# Patient Record
Sex: FEMALE | Race: OTHER | HISPANIC OR LATINO
[De-identification: names, ages, dates, MRNs, and addresses within clinical notes are randomized per-mention and may not be internally consistent; named-entity substitution may affect disease eponyms.]

---

## 2017-01-20 ENCOUNTER — APPOINTMENT (OUTPATIENT)
Dept: PULMONOLOGY | Facility: CLINIC | Age: 32
End: 2017-01-20

## 2017-01-20 VITALS
SYSTOLIC BLOOD PRESSURE: 120 MMHG | HEART RATE: 128 BPM | BODY MASS INDEX: 29.81 KG/M2 | RESPIRATION RATE: 18 BRPM | DIASTOLIC BLOOD PRESSURE: 74 MMHG | HEIGHT: 62 IN | WEIGHT: 162 LBS | OXYGEN SATURATION: 92 %

## 2017-02-23 ENCOUNTER — RX RENEWAL (OUTPATIENT)
Age: 32
End: 2017-02-23

## 2017-02-26 ENCOUNTER — RX RENEWAL (OUTPATIENT)
Age: 32
End: 2017-02-26

## 2017-03-21 ENCOUNTER — TRANSCRIPTION ENCOUNTER (OUTPATIENT)
Age: 32
End: 2017-03-21

## 2017-03-26 ENCOUNTER — EMERGENCY (EMERGENCY)
Facility: HOSPITAL | Age: 32
LOS: 1 days | Discharge: HOME | End: 2017-03-26
Attending: EMERGENCY MEDICINE
Payer: COMMERCIAL

## 2017-03-26 VITALS
WEIGHT: 160.06 LBS | HEIGHT: 64 IN | SYSTOLIC BLOOD PRESSURE: 165 MMHG | HEART RATE: 119 BPM | OXYGEN SATURATION: 94 % | DIASTOLIC BLOOD PRESSURE: 120 MMHG | TEMPERATURE: 98 F | RESPIRATION RATE: 22 BRPM

## 2017-03-26 DIAGNOSIS — I10 ESSENTIAL (PRIMARY) HYPERTENSION: ICD-10-CM

## 2017-03-26 DIAGNOSIS — J45.21 MILD INTERMITTENT ASTHMA WITH (ACUTE) EXACERBATION: ICD-10-CM

## 2017-03-26 PROCEDURE — 99284 EMERGENCY DEPT VISIT MOD MDM: CPT | Mod: 25

## 2017-03-26 RX ORDER — ALBUTEROL 90 UG/1
3 AEROSOL, METERED ORAL
Qty: 28 | Refills: 0 | OUTPATIENT
Start: 2017-03-26 | End: 2017-04-02

## 2017-03-26 RX ORDER — IPRATROPIUM/ALBUTEROL SULFATE 18-103MCG
3 AEROSOL WITH ADAPTER (GRAM) INHALATION ONCE
Qty: 0 | Refills: 0 | Status: COMPLETED | OUTPATIENT
Start: 2017-03-26 | End: 2017-03-26

## 2017-03-26 RX ORDER — ALBUTEROL 90 UG/1
3 AEROSOL, METERED ORAL
Qty: 28 | Refills: 0 | OUTPATIENT
Start: 2017-03-26 | End: 2019-03-25

## 2017-03-26 RX ADMIN — Medication 3 MILLILITER(S): at 23:20

## 2017-03-26 RX ADMIN — Medication 40 MILLIGRAM(S): at 23:38

## 2017-03-26 RX ADMIN — Medication 3 MILLILITER(S): at 23:38

## 2017-03-26 NOTE — ED PROVIDER NOTE - MEDICAL DECISION MAKING DETAILS
32yo with acute asthma exacerbation, will tx with isaiah prajapatis and reassess. Tachycardia likely 2/2 albuterol, pt otherwise well appearing, pt thinks this is similar to her prior exaccerbations and thinks she can go home. Will discharge on steriods, f/u with pmd, strict return precautions for difficulty breathing, fevers, chills, or any other concernign symptoms.

## 2017-03-26 NOTE — ED PROVIDER NOTE - PROGRESS NOTE DETAILS
peak flow 300, pt states baseline is 350-400, states it was 240 earlier today. pt feeling much better, we ambulating while talking in full sentence without sig respiratory distress, hr still elevated, but pt just finished her final duoneb, pt states she feels well enough to go home, will discharge home. strict return precautions. pt feeling much better, we ambulating while talking in full sentence without sig respiratory distress, hr still elevated, but pt just finished her final duoneb, pt states she feels well enough to go home, will discharge home. strict return precautions. On my assessment, while ambulating, o2 sat 98, hr 118, respiratory rate 20

## 2017-03-26 NOTE — ED PROVIDER NOTE - OBJECTIVE STATEMENT
32yo female hx of htn, asthma, hospitalization in the past, no intubation p/w wheezing and sob since last night. She notes that she caught a cold and developed a wheeze. it is typical of her asthma. She took a nebuliser tx last night and then her pump several times today, but did not get better. no fevers, or chills, mild cough. No chest pain, She notes that since being here her breathing has gotten better. no hx of dvt/pe, no recent trauma/surg, no hemoptysis, no estrogen, no unilateral leg swelling.

## 2017-03-27 VITALS — HEART RATE: 109 BPM

## 2017-03-27 PROCEDURE — 99285 EMERGENCY DEPT VISIT HI MDM: CPT | Mod: 25

## 2017-03-27 PROCEDURE — 94640 AIRWAY INHALATION TREATMENT: CPT

## 2017-03-27 RX ADMIN — Medication 3 MILLILITER(S): at 00:27

## 2017-03-27 NOTE — ED ADULT NURSE NOTE - OBJECTIVE STATEMENT
Pt presented to er with c/o shortness of breath with asthma exacerbation. as per pt she run out off her asthma medications.

## 2017-04-18 ENCOUNTER — APPOINTMENT (OUTPATIENT)
Dept: INTERNAL MEDICINE | Facility: CLINIC | Age: 32
End: 2017-04-18

## 2017-04-18 ENCOUNTER — RX RENEWAL (OUTPATIENT)
Age: 32
End: 2017-04-18

## 2017-04-18 VITALS — DIASTOLIC BLOOD PRESSURE: 90 MMHG | HEART RATE: 92 BPM | SYSTOLIC BLOOD PRESSURE: 128 MMHG

## 2017-04-18 VITALS
SYSTOLIC BLOOD PRESSURE: 159 MMHG | DIASTOLIC BLOOD PRESSURE: 95 MMHG | BODY MASS INDEX: 29.63 KG/M2 | OXYGEN SATURATION: 95 % | HEART RATE: 105 BPM | WEIGHT: 162 LBS

## 2017-04-18 VITALS — HEART RATE: 96 BPM | SYSTOLIC BLOOD PRESSURE: 130 MMHG | DIASTOLIC BLOOD PRESSURE: 90 MMHG

## 2017-04-18 RX ORDER — PREDNISONE 10 MG/1
10 TABLET ORAL
Qty: 100 | Refills: 0 | Status: DISCONTINUED | COMMUNITY
Start: 2017-01-20 | End: 2017-04-18

## 2017-05-08 ENCOUNTER — APPOINTMENT (OUTPATIENT)
Dept: PULMONOLOGY | Facility: CLINIC | Age: 32
End: 2017-05-08

## 2017-05-08 ENCOUNTER — TRANSCRIPTION ENCOUNTER (OUTPATIENT)
Age: 32
End: 2017-05-08

## 2017-05-08 VITALS
DIASTOLIC BLOOD PRESSURE: 70 MMHG | OXYGEN SATURATION: 97 % | SYSTOLIC BLOOD PRESSURE: 108 MMHG | HEART RATE: 83 BPM | RESPIRATION RATE: 17 BRPM | HEIGHT: 62 IN | WEIGHT: 160 LBS | BODY MASS INDEX: 29.44 KG/M2

## 2017-05-10 ENCOUNTER — TRANSCRIPTION ENCOUNTER (OUTPATIENT)
Age: 32
End: 2017-05-10

## 2017-05-19 ENCOUNTER — APPOINTMENT (OUTPATIENT)
Dept: INTERNAL MEDICINE | Facility: CLINIC | Age: 32
End: 2017-05-19

## 2017-05-19 VITALS
DIASTOLIC BLOOD PRESSURE: 90 MMHG | BODY MASS INDEX: 28.9 KG/M2 | SYSTOLIC BLOOD PRESSURE: 131 MMHG | HEART RATE: 88 BPM | OXYGEN SATURATION: 90 % | WEIGHT: 158 LBS

## 2017-05-19 VITALS — SYSTOLIC BLOOD PRESSURE: 110 MMHG | DIASTOLIC BLOOD PRESSURE: 96 MMHG

## 2017-05-19 VITALS — SYSTOLIC BLOOD PRESSURE: 120 MMHG | DIASTOLIC BLOOD PRESSURE: 100 MMHG

## 2017-05-22 ENCOUNTER — MEDICATION RENEWAL (OUTPATIENT)
Age: 32
End: 2017-05-22

## 2017-05-23 ENCOUNTER — MEDICATION RENEWAL (OUTPATIENT)
Age: 32
End: 2017-05-23

## 2017-05-23 ENCOUNTER — TRANSCRIPTION ENCOUNTER (OUTPATIENT)
Age: 32
End: 2017-05-23

## 2017-06-05 LAB
ALBUMIN SERPL ELPH-MCNC: 4.4 G/DL
ALP BLD-CCNC: 79 U/L
ALT SERPL-CCNC: 20 U/L
ANION GAP SERPL CALC-SCNC: 16 MMOL/L
ANION GAP SERPL CALC-SCNC: 17 MMOL/L
AST SERPL-CCNC: 25 U/L
BILIRUB SERPL-MCNC: 0.3 MG/DL
BUN SERPL-MCNC: 14 MG/DL
BUN SERPL-MCNC: 14 MG/DL
CALCIUM SERPL-MCNC: 9.5 MG/DL
CALCIUM SERPL-MCNC: 9.6 MG/DL
CHLORIDE SERPL-SCNC: 100 MMOL/L
CHLORIDE SERPL-SCNC: 101 MMOL/L
CHOLEST SERPL-MCNC: 211 MG/DL
CHOLEST/HDLC SERPL: 3.1 RATIO
CO2 SERPL-SCNC: 23 MMOL/L
CO2 SERPL-SCNC: 25 MMOL/L
CREAT SERPL-MCNC: 0.82 MG/DL
CREAT SERPL-MCNC: 0.83 MG/DL
GLUCOSE SERPL-MCNC: 101 MG/DL
GLUCOSE SERPL-MCNC: 95 MG/DL
HBA1C MFR BLD HPLC: 6.6 %
HDLC SERPL-MCNC: 68 MG/DL
LDLC SERPL CALC-MCNC: 121 MG/DL
POTASSIUM SERPL-SCNC: 4.3 MMOL/L
POTASSIUM SERPL-SCNC: 4.4 MMOL/L
PROT SERPL-MCNC: 7.9 G/DL
SODIUM SERPL-SCNC: 140 MMOL/L
SODIUM SERPL-SCNC: 142 MMOL/L
TRIGL SERPL-MCNC: 111 MG/DL

## 2017-06-07 ENCOUNTER — TRANSCRIPTION ENCOUNTER (OUTPATIENT)
Age: 32
End: 2017-06-07

## 2017-06-10 ENCOUNTER — RX RENEWAL (OUTPATIENT)
Age: 32
End: 2017-06-10

## 2017-06-14 ENCOUNTER — MEDICATION RENEWAL (OUTPATIENT)
Age: 32
End: 2017-06-14

## 2017-07-11 ENCOUNTER — APPOINTMENT (OUTPATIENT)
Dept: INTERNAL MEDICINE | Facility: CLINIC | Age: 32
End: 2017-07-11

## 2017-07-14 ENCOUNTER — RX RENEWAL (OUTPATIENT)
Age: 32
End: 2017-07-14

## 2017-08-09 ENCOUNTER — APPOINTMENT (OUTPATIENT)
Dept: PULMONOLOGY | Facility: CLINIC | Age: 32
End: 2017-08-09

## 2017-09-11 ENCOUNTER — MEDICATION RENEWAL (OUTPATIENT)
Age: 32
End: 2017-09-11

## 2017-09-11 ENCOUNTER — RX RENEWAL (OUTPATIENT)
Age: 32
End: 2017-09-11

## 2017-09-12 ENCOUNTER — RX RENEWAL (OUTPATIENT)
Age: 32
End: 2017-09-12

## 2017-10-06 ENCOUNTER — APPOINTMENT (OUTPATIENT)
Dept: INTERNAL MEDICINE | Facility: CLINIC | Age: 32
End: 2017-10-06
Payer: COMMERCIAL

## 2017-10-06 VITALS
OXYGEN SATURATION: 98 % | HEIGHT: 62 IN | SYSTOLIC BLOOD PRESSURE: 130 MMHG | HEART RATE: 78 BPM | WEIGHT: 161 LBS | BODY MASS INDEX: 29.63 KG/M2 | DIASTOLIC BLOOD PRESSURE: 100 MMHG

## 2017-10-06 VITALS — SYSTOLIC BLOOD PRESSURE: 140 MMHG | DIASTOLIC BLOOD PRESSURE: 90 MMHG

## 2017-10-06 VITALS — SYSTOLIC BLOOD PRESSURE: 130 MMHG | DIASTOLIC BLOOD PRESSURE: 88 MMHG

## 2017-10-06 DIAGNOSIS — Z87.898 PERSONAL HISTORY OF OTHER SPECIFIED CONDITIONS: ICD-10-CM

## 2017-10-06 DIAGNOSIS — R06.02 SHORTNESS OF BREATH: ICD-10-CM

## 2017-10-06 PROCEDURE — 90688 IIV4 VACCINE SPLT 0.5 ML IM: CPT

## 2017-10-06 PROCEDURE — 99395 PREV VISIT EST AGE 18-39: CPT | Mod: 25

## 2017-10-06 PROCEDURE — G0008: CPT

## 2017-10-06 RX ORDER — PREDNISONE 10 MG/1
10 TABLET ORAL
Qty: 1 | Refills: 0 | Status: DISCONTINUED | COMMUNITY
Start: 2017-05-22 | End: 2017-10-06

## 2017-10-10 LAB
25(OH)D3 SERPL-MCNC: 14.5 NG/ML
ANION GAP SERPL CALC-SCNC: 15 MMOL/L
BASOPHILS # BLD AUTO: 0.04 K/UL
BASOPHILS NFR BLD AUTO: 0.8 %
BUN SERPL-MCNC: 12 MG/DL
CALCIUM SERPL-MCNC: 9.5 MG/DL
CHLORIDE SERPL-SCNC: 102 MMOL/L
CHOLEST SERPL-MCNC: 205 MG/DL
CHOLEST/HDLC SERPL: 2.8 RATIO
CO2 SERPL-SCNC: 23 MMOL/L
CREAT SERPL-MCNC: 0.88 MG/DL
CREAT SPEC-SCNC: 51 MG/DL
EOSINOPHIL # BLD AUTO: 0.52 K/UL
EOSINOPHIL NFR BLD AUTO: 10.3 %
GLUCOSE SERPL-MCNC: 93 MG/DL
HBA1C MFR BLD HPLC: 6.1 %
HCT VFR BLD CALC: 41 %
HDLC SERPL-MCNC: 72 MG/DL
HGB BLD-MCNC: 13.6 G/DL
IMM GRANULOCYTES NFR BLD AUTO: 0.2 %
LDLC SERPL CALC-MCNC: 119 MG/DL
LYMPHOCYTES # BLD AUTO: 1.46 K/UL
LYMPHOCYTES NFR BLD AUTO: 28.9 %
MAN DIFF?: NORMAL
MCHC RBC-ENTMCNC: 29.2 PG
MCHC RBC-ENTMCNC: 33.2 GM/DL
MCV RBC AUTO: 88 FL
MICROALBUMIN 24H UR DL<=1MG/L-MCNC: 0.4 MG/DL
MICROALBUMIN/CREAT 24H UR-RTO: 8 MG/G
MONOCYTES # BLD AUTO: 0.44 K/UL
MONOCYTES NFR BLD AUTO: 8.7 %
NEUTROPHILS # BLD AUTO: 2.59 K/UL
NEUTROPHILS NFR BLD AUTO: 51.1 %
PLATELET # BLD AUTO: 266 K/UL
POTASSIUM SERPL-SCNC: 4.8 MMOL/L
RBC # BLD: 4.66 M/UL
RBC # FLD: 14 %
SODIUM SERPL-SCNC: 140 MMOL/L
TRIGL SERPL-MCNC: 72 MG/DL
WBC # FLD AUTO: 5.06 K/UL

## 2017-11-06 ENCOUNTER — RX RENEWAL (OUTPATIENT)
Age: 32
End: 2017-11-06

## 2017-12-13 ENCOUNTER — APPOINTMENT (OUTPATIENT)
Dept: PULMONOLOGY | Facility: CLINIC | Age: 32
End: 2017-12-13
Payer: COMMERCIAL

## 2017-12-13 VITALS
HEART RATE: 97 BPM | SYSTOLIC BLOOD PRESSURE: 140 MMHG | WEIGHT: 163 LBS | DIASTOLIC BLOOD PRESSURE: 90 MMHG | HEIGHT: 62 IN | OXYGEN SATURATION: 98 % | BODY MASS INDEX: 30 KG/M2 | RESPIRATION RATE: 18 BRPM

## 2017-12-13 PROCEDURE — 99214 OFFICE O/P EST MOD 30 MIN: CPT

## 2018-02-21 ENCOUNTER — RX RENEWAL (OUTPATIENT)
Age: 33
End: 2018-02-21

## 2018-04-11 ENCOUNTER — APPOINTMENT (OUTPATIENT)
Dept: INTERNAL MEDICINE | Facility: CLINIC | Age: 33
End: 2018-04-11
Payer: COMMERCIAL

## 2018-04-11 VITALS
HEART RATE: 84 BPM | BODY MASS INDEX: 29.26 KG/M2 | DIASTOLIC BLOOD PRESSURE: 90 MMHG | OXYGEN SATURATION: 98 % | HEIGHT: 62 IN | WEIGHT: 159 LBS | SYSTOLIC BLOOD PRESSURE: 120 MMHG

## 2018-04-11 VITALS — SYSTOLIC BLOOD PRESSURE: 120 MMHG | DIASTOLIC BLOOD PRESSURE: 90 MMHG

## 2018-04-11 PROCEDURE — 99214 OFFICE O/P EST MOD 30 MIN: CPT

## 2018-04-11 RX ORDER — BECLOMETHASONE DIPROPIONATE 80 UG/1
80 AEROSOL, METERED RESPIRATORY (INHALATION) TWICE DAILY
Qty: 3 | Refills: 1 | Status: DISCONTINUED | COMMUNITY
Start: 2017-01-20 | End: 2018-04-11

## 2018-04-11 RX ORDER — LEVOCETIRIZINE DIHYDROCHLORIDE 5 MG/1
5 TABLET ORAL
Qty: 90 | Refills: 3 | Status: DISCONTINUED | COMMUNITY
Start: 2017-02-23 | End: 2018-04-11

## 2018-04-12 ENCOUNTER — APPOINTMENT (OUTPATIENT)
Dept: PULMONOLOGY | Facility: CLINIC | Age: 33
End: 2018-04-12
Payer: COMMERCIAL

## 2018-04-12 VITALS
HEIGHT: 62 IN | WEIGHT: 157 LBS | BODY MASS INDEX: 28.89 KG/M2 | DIASTOLIC BLOOD PRESSURE: 75 MMHG | SYSTOLIC BLOOD PRESSURE: 115 MMHG | HEART RATE: 93 BPM | RESPIRATION RATE: 15 BRPM | OXYGEN SATURATION: 96 %

## 2018-04-12 PROCEDURE — 99214 OFFICE O/P EST MOD 30 MIN: CPT | Mod: 25

## 2018-04-12 PROCEDURE — 71046 X-RAY EXAM CHEST 2 VIEWS: CPT

## 2018-04-12 RX ORDER — PREDNISONE 20 MG/1
20 TABLET ORAL DAILY
Qty: 6 | Refills: 0 | Status: DISCONTINUED | COMMUNITY
Start: 2018-04-11 | End: 2018-04-12

## 2018-04-16 RX ORDER — BECLOMETHASONE DIPROPIONATE HFA 80 UG/1
80 AEROSOL, METERED RESPIRATORY (INHALATION) TWICE DAILY
Qty: 3 | Refills: 1 | Status: DISCONTINUED | COMMUNITY
Start: 2018-04-12 | End: 2018-04-16

## 2018-04-16 RX ORDER — BECLOMETHASONE DIPROPIONATE HFA 80 UG/1
80 AEROSOL, METERED RESPIRATORY (INHALATION)
Qty: 1 | Refills: 3 | Status: DISCONTINUED | COMMUNITY
Start: 2018-02-21 | End: 2018-04-16

## 2018-04-23 LAB
ANION GAP SERPL CALC-SCNC: 16 MMOL/L
BUN SERPL-MCNC: 16 MG/DL
CALCIUM SERPL-MCNC: 10.1 MG/DL
CHLORIDE SERPL-SCNC: 103 MMOL/L
CO2 SERPL-SCNC: 17 MMOL/L
CREAT SERPL-MCNC: 1.15 MG/DL
GLUCOSE SERPL-MCNC: 74 MG/DL
HBA1C MFR BLD HPLC: 6.6 %
POTASSIUM SERPL-SCNC: 4.8 MMOL/L
SODIUM SERPL-SCNC: 136 MMOL/L

## 2018-06-13 ENCOUNTER — APPOINTMENT (OUTPATIENT)
Dept: PULMONOLOGY | Facility: CLINIC | Age: 33
End: 2018-06-13

## 2018-06-18 ENCOUNTER — APPOINTMENT (OUTPATIENT)
Dept: PULMONOLOGY | Facility: CLINIC | Age: 33
End: 2018-06-18
Payer: COMMERCIAL

## 2018-06-18 VITALS
DIASTOLIC BLOOD PRESSURE: 70 MMHG | WEIGHT: 157 LBS | BODY MASS INDEX: 28.89 KG/M2 | HEIGHT: 62 IN | RESPIRATION RATE: 17 BRPM | OXYGEN SATURATION: 94 % | HEART RATE: 108 BPM | SYSTOLIC BLOOD PRESSURE: 110 MMHG

## 2018-06-18 PROCEDURE — 99214 OFFICE O/P EST MOD 30 MIN: CPT

## 2018-06-21 ENCOUNTER — MEDICATION RENEWAL (OUTPATIENT)
Age: 33
End: 2018-06-21

## 2018-06-22 ENCOUNTER — MEDICATION RENEWAL (OUTPATIENT)
Age: 33
End: 2018-06-22

## 2018-07-19 ENCOUNTER — MEDICATION RENEWAL (OUTPATIENT)
Age: 33
End: 2018-07-19

## 2018-08-09 ENCOUNTER — MEDICATION RENEWAL (OUTPATIENT)
Age: 33
End: 2018-08-09

## 2018-09-20 ENCOUNTER — APPOINTMENT (OUTPATIENT)
Dept: PULMONOLOGY | Facility: CLINIC | Age: 33
End: 2018-09-20
Payer: COMMERCIAL

## 2018-09-20 ENCOUNTER — NON-APPOINTMENT (OUTPATIENT)
Age: 33
End: 2018-09-20

## 2018-09-20 VITALS
HEIGHT: 62 IN | OXYGEN SATURATION: 95 % | DIASTOLIC BLOOD PRESSURE: 80 MMHG | RESPIRATION RATE: 17 BRPM | HEART RATE: 72 BPM | SYSTOLIC BLOOD PRESSURE: 120 MMHG | BODY MASS INDEX: 28.71 KG/M2 | WEIGHT: 156 LBS

## 2018-09-20 DIAGNOSIS — J45.901 ACUTE BRONCHITIS, UNSPECIFIED: ICD-10-CM

## 2018-09-20 DIAGNOSIS — J20.9 ACUTE BRONCHITIS, UNSPECIFIED: ICD-10-CM

## 2018-09-20 PROCEDURE — 99214 OFFICE O/P EST MOD 30 MIN: CPT | Mod: 25

## 2018-09-20 PROCEDURE — 94010 BREATHING CAPACITY TEST: CPT

## 2018-09-20 NOTE — ASSESSMENT
[FreeTextEntry1] : Ms. Blue is a 32 year old female with a history of asthma / GERD / Allergy / overweight. Her asthma is active and is both IgE mediated and eosinophil mediated- her asthma is poor controlled - still active . \par \par problem 1: severe persistent asthma, currently active \par *climate related - GERD \par -Continue albuterol by the nebulizer QID\par -continue to use Ventolin PRN and before exercise\par -continue to use Advair 500 at 1 inhalation BId\par -Continue QVar 80 at 2 inhalations BID\par -Continue Spiriva 2 inhalations QD\par -continue to use Singulair 10 mg before bed\par -all meds needed were renewed\par -Inhaler technique reviewed as well as oral hygiene techniques reviewed with patient. Avoidance of cold air, extremes of temperature, rescue inhaler should be used before exercise. Order of medication reviewed with patient. Recommended use of a cool mist humidifier in the bedroom. \par \par problem 2: Allergic Asthma (Elevated IgE)\par -her elevated IgE makes her a candidate for Xolair\par \par Problem 3; Eosinophilic Asthma\par - She is a candidate for Nucala - approved\par -The safety and efficacy of Nucala was established in three double-blind, randomized, placebo-controlled trials in patients with severe asthma. Compared to a placebo, patients with severe asthma receiving Nucala had fewer exacerbation requiring hospitalization and/or emergency department visits, and a longer time to first exacerbation. In addition, patients with severe asthma receiving Nucala or Fasenra experienced greater reductions in their daily maintenance oral corticosteroid dose, while maintaining asthma control compared with patients receiving placebo. Treatment with Nucala did not result in a significant improvement in lung function, as measured by the volume of air exhaled by patients in one second. The most common side effects include: headache, injection site reactions, back pain, weakness, and fatigue; hypersensitivity reactions can occur within hours or days including swelling of the face, mouth, and tongue, fainting, dizziness, hives, breathing problems, and rash; herpes zoster infections have occurred. The drug is a monoclonal antibody that inhibits interleukin-5 which helps regular eosinophils, a type of white blood cell that contributes to asthma. The over-production of eosinophils can cause inflammation in the lungs, increasing the frequency of asthma attacks. Patients must also take other medications, including high dose inhaled corticosteroids and at least one additional asthma drug\par \par problem 4: allergic rhinitis\par -continue to use Flonase 1 sniff each nostril BID\par -continue to use Xyzal 5 mg before bed \par -Environmental measures for allergies were encouraged including mattress and pillow cover, air purifier, and environmental controls.\par \par problem 5: low vitamin d\par -continue to use supplemental vitamin D\par -Has been associated with asthma exacerbations and increased allergic symptoms. The goal based on recent information is maintaining levels between 50-70 and low normal is 30. Recommended 50,000 units every two weeks to once a month depending on the level. \par \par problem 6: GERD-stable/controlled\par -add Protonix 40 mg QAM \par -Continue Zantac 300 mg QHS\par -Rule of 2s: avoid eating too much, eating too late, eating too spicy, eating two hours before bed\par -Things to avoid including overeating, spicy foods, tight clothing, eating within three hours of bed, this list is not all inclusive. \par -For treatment of reflux, possible options discussed including diet control, H2 blockers, PPIs, as well as coating motility agents discussed as treatment options. Timing of meals and proximity of last meal to sleep were discussed. If symptoms persist, a formal gastrointestinal evaluation is needed. \par \par problem 7: overweight\par -Weight loss, exercise, and diet control were discussed and are highly encouraged. Treatment options were given such as, aqua therapy, and contacting a nutritionist. Recommended to use the elliptical, stationary bike, less use of treadmill. \par \par problem 8: health maintenance \par -recommended yearly flu shot after October 15\par -recommended strep pneumonia vaccines: Prevnar-13 vaccine, followed by Pneumo vaccine 23 one year following\par -recommended early intervention for URIs\par -recommended regular osteoporosis evaluations\par -recommended early dermatological evaluations\par -recommended after the age of 50 to the age of 70, colonoscopy every 5 years \par \par \par Keep her F/U \par She is encouraged to call with any changes, concerns, or questions

## 2018-09-20 NOTE — PROCEDURE
[FreeTextEntry1] : PFT - spi reveals mild restrictive / mild obstructive dysfunction ; FEV1 is 1.61 which is 54% of predicted, normal flow volume loop

## 2018-09-20 NOTE — PHYSICAL EXAM

## 2018-09-20 NOTE — REASON FOR VISIT
[Follow-Up] : a follow-up visit [FreeTextEntry1] : allergic eosinophilia, allergic rhinitis, elevated IgE, severe persistent asthma and vitamin D deficiency

## 2018-09-20 NOTE — HISTORY OF PRESENT ILLNESS
[FreeTextEntry1] : Ms. Blue is a 32 year old female with a history of allergic eosinophilia, allergic rhinitis, elevated IgE, severe persistent asthma and vitamin D deficiency presenting to the office for a follow up visit. Her chief complaint is\par -she noticed that she has been wheezing when the weather has been hot and humid\par -she states that she was just informed that her insurance will cover her Nucala injections\par -she states that she has been sleeping well but not sleeping enough due to her schedule\par -she reports that she has not been exercising regularly\par -she notes that she has still been getting some heartburn even while on reflux medications\par -she notes that she has not been snoring, and her memory / concentration have been good\par -she states that she gets occasional leg cramps while at night , although she believes she does not drink enough water \par -she denies any headaches, nausea, vomiting, fever, chills, sweats, chest pain, chest pressure, diarrhea, constipation, dysphagia, dizziness, leg swelling, leg pain, itchy eyes, itchy ears, heartburn, reflux, or sour taste in the mouth.

## 2018-09-20 NOTE — ADDENDUM
[FreeTextEntry1] : All medical record entries made by crystal Dixon were at Dr. Zac Kelley's, direction and personally dictated by me on (9/20/2018). I have reviewed the chart and agree that the record accurately reflects my personal performance of the history, physical exam, assessment and plan. I have also personally directed, reviewed, and agree with the discharge instructions.

## 2018-09-21 ENCOUNTER — MEDICATION RENEWAL (OUTPATIENT)
Age: 33
End: 2018-09-21

## 2018-10-08 ENCOUNTER — APPOINTMENT (OUTPATIENT)
Dept: PULMONOLOGY | Facility: CLINIC | Age: 33
End: 2018-10-08

## 2018-10-12 ENCOUNTER — APPOINTMENT (OUTPATIENT)
Dept: PULMONOLOGY | Facility: CLINIC | Age: 33
End: 2018-10-12
Payer: COMMERCIAL

## 2018-10-12 VITALS
HEART RATE: 99 BPM | BODY MASS INDEX: 29.44 KG/M2 | RESPIRATION RATE: 15 BRPM | HEIGHT: 62 IN | WEIGHT: 160 LBS | DIASTOLIC BLOOD PRESSURE: 80 MMHG | OXYGEN SATURATION: 94 % | SYSTOLIC BLOOD PRESSURE: 110 MMHG

## 2018-10-12 VITALS
HEART RATE: 105 BPM | SYSTOLIC BLOOD PRESSURE: 110 MMHG | OXYGEN SATURATION: 94 % | DIASTOLIC BLOOD PRESSURE: 70 MMHG | BODY MASS INDEX: 29.44 KG/M2 | RESPIRATION RATE: 15 BRPM | HEIGHT: 62 IN | WEIGHT: 160 LBS

## 2018-10-12 VITALS
BODY MASS INDEX: 29.44 KG/M2 | WEIGHT: 160 LBS | OXYGEN SATURATION: 94 % | HEART RATE: 88 BPM | DIASTOLIC BLOOD PRESSURE: 70 MMHG | HEIGHT: 62 IN | SYSTOLIC BLOOD PRESSURE: 110 MMHG | RESPIRATION RATE: 15 BRPM

## 2018-10-12 VITALS
RESPIRATION RATE: 15 BRPM | DIASTOLIC BLOOD PRESSURE: 80 MMHG | BODY MASS INDEX: 29.26 KG/M2 | WEIGHT: 160 LBS | HEART RATE: 100 BPM | OXYGEN SATURATION: 97 % | SYSTOLIC BLOOD PRESSURE: 110 MMHG

## 2018-10-12 PROCEDURE — 96401 CHEMO ANTI-NEOPL SQ/IM: CPT

## 2018-10-12 RX ORDER — MEPOLIZUMAB 100 MG/ML
100 INJECTION, POWDER, FOR SOLUTION SUBCUTANEOUS
Qty: 0 | Refills: 0 | Status: COMPLETED | OUTPATIENT
Start: 2018-10-12

## 2018-10-12 RX ADMIN — MEPOLIZUMAB 1 MG: 100 INJECTION, POWDER, FOR SOLUTION SUBCUTANEOUS at 00:00

## 2018-11-09 ENCOUNTER — APPOINTMENT (OUTPATIENT)
Dept: PULMONOLOGY | Facility: CLINIC | Age: 33
End: 2018-11-09
Payer: COMMERCIAL

## 2018-11-09 VITALS
HEIGHT: 62 IN | WEIGHT: 162 LBS | RESPIRATION RATE: 15 BRPM | OXYGEN SATURATION: 98 % | HEART RATE: 92 BPM | DIASTOLIC BLOOD PRESSURE: 70 MMHG | BODY MASS INDEX: 29.81 KG/M2 | SYSTOLIC BLOOD PRESSURE: 110 MMHG

## 2018-11-09 PROCEDURE — 96401 CHEMO ANTI-NEOPL SQ/IM: CPT

## 2018-11-09 RX ORDER — MEPOLIZUMAB 100 MG/ML
100 INJECTION, POWDER, FOR SOLUTION SUBCUTANEOUS
Qty: 0 | Refills: 0 | Status: COMPLETED | OUTPATIENT
Start: 2018-11-09

## 2018-11-09 RX ADMIN — MEPOLIZUMAB 1 MG: 100 INJECTION, POWDER, FOR SOLUTION SUBCUTANEOUS at 00:00

## 2018-11-29 ENCOUNTER — RX RENEWAL (OUTPATIENT)
Age: 33
End: 2018-11-29

## 2018-11-30 ENCOUNTER — RX RENEWAL (OUTPATIENT)
Age: 33
End: 2018-11-30

## 2018-12-03 RX ORDER — OLMESARTAN MEDOXOMIL AND HYDROCHLOROTHIAZIDE 40; 25 MG/1; MG/1
40-25 TABLET ORAL
Qty: 90 | Refills: 3 | Status: DISCONTINUED | COMMUNITY
Start: 2017-05-19 | End: 2018-12-03

## 2018-12-07 ENCOUNTER — APPOINTMENT (OUTPATIENT)
Dept: INTERNAL MEDICINE | Facility: CLINIC | Age: 33
End: 2018-12-07
Payer: COMMERCIAL

## 2018-12-07 ENCOUNTER — APPOINTMENT (OUTPATIENT)
Dept: PULMONOLOGY | Facility: CLINIC | Age: 33
End: 2018-12-07

## 2018-12-07 VITALS
HEART RATE: 91 BPM | SYSTOLIC BLOOD PRESSURE: 122 MMHG | HEIGHT: 62 IN | BODY MASS INDEX: 29.81 KG/M2 | WEIGHT: 162 LBS | OXYGEN SATURATION: 98 % | DIASTOLIC BLOOD PRESSURE: 80 MMHG

## 2018-12-07 VITALS — SYSTOLIC BLOOD PRESSURE: 118 MMHG | DIASTOLIC BLOOD PRESSURE: 80 MMHG

## 2018-12-07 PROCEDURE — 90686 IIV4 VACC NO PRSV 0.5 ML IM: CPT

## 2018-12-07 PROCEDURE — 99214 OFFICE O/P EST MOD 30 MIN: CPT | Mod: 25

## 2018-12-07 PROCEDURE — G0008: CPT

## 2018-12-08 NOTE — HISTORY OF PRESENT ILLNESS
[FreeTextEntry1] : bp check [de-identified] : 32 yo female with h/o as below including HTN and asthma here for bp check.  Just recently found out she was pregnant, so ARB was d/c'd and switched back to Nifedipine.  \par Most days bp has been in 120s/80s.  On Nifedipine, not having migraines currently with it.\par LMP 10/24/18.  Currently six weeks pregnant.  Has appt with gyn 12/21.\par Otherwise feeling well.  Asthma under good control, not wheezy.  Was getting GERD before, not anymore.  Hasn't been taking asthma pills as not sure what she could take.  Does take spiriva and qvar and advair.\par No other active issues.\par \par

## 2018-12-08 NOTE — ASSESSMENT
[FreeTextEntry1] : 32 yo female with h/o as above including asthma and HTN here for f/up and bp check, now pregnant.  BP at goal on nifedipine, c/w current regimen, reviewed other meds with regards to pregnancy class, pt to f/up with obgyn in a few weeks and will see high  as well.  Flu shot today.  RTO prn.

## 2018-12-19 ENCOUNTER — RX RENEWAL (OUTPATIENT)
Age: 33
End: 2018-12-19

## 2018-12-21 ENCOUNTER — NON-APPOINTMENT (OUTPATIENT)
Age: 33
End: 2018-12-21

## 2018-12-21 ENCOUNTER — APPOINTMENT (OUTPATIENT)
Dept: OBGYN | Facility: CLINIC | Age: 33
End: 2018-12-21
Payer: COMMERCIAL

## 2018-12-21 ENCOUNTER — APPOINTMENT (OUTPATIENT)
Dept: PULMONOLOGY | Facility: CLINIC | Age: 33
End: 2018-12-21
Payer: COMMERCIAL

## 2018-12-21 VITALS
SYSTOLIC BLOOD PRESSURE: 120 MMHG | BODY MASS INDEX: 29.81 KG/M2 | HEIGHT: 62 IN | HEART RATE: 95 BPM | RESPIRATION RATE: 17 BRPM | OXYGEN SATURATION: 100 % | DIASTOLIC BLOOD PRESSURE: 70 MMHG | WEIGHT: 162 LBS

## 2018-12-21 VITALS
WEIGHT: 162 LBS | DIASTOLIC BLOOD PRESSURE: 80 MMHG | BODY MASS INDEX: 29.81 KG/M2 | HEIGHT: 62 IN | SYSTOLIC BLOOD PRESSURE: 124 MMHG

## 2018-12-21 DIAGNOSIS — Z87.42 PERSONAL HISTORY OF OTHER DISEASES OF THE FEMALE GENITAL TRACT: ICD-10-CM

## 2018-12-21 DIAGNOSIS — Z82.49 FAMILY HISTORY OF ISCHEMIC HEART DISEASE AND OTHER DISEASES OF THE CIRCULATORY SYSTEM: ICD-10-CM

## 2018-12-21 DIAGNOSIS — M79.671 PAIN IN RIGHT FOOT: ICD-10-CM

## 2018-12-21 DIAGNOSIS — Z29.9 ENCOUNTER FOR PROPHYLACTIC MEASURES, UNSPECIFIED: ICD-10-CM

## 2018-12-21 DIAGNOSIS — M79.672 PAIN IN RIGHT FOOT: ICD-10-CM

## 2018-12-21 PROCEDURE — 94010 BREATHING CAPACITY TEST: CPT

## 2018-12-21 PROCEDURE — 76830 TRANSVAGINAL US NON-OB: CPT

## 2018-12-21 PROCEDURE — 95012 NITRIC OXIDE EXP GAS DETER: CPT

## 2018-12-21 PROCEDURE — 99214 OFFICE O/P EST MOD 30 MIN: CPT | Mod: 25

## 2018-12-21 RX ORDER — RANITIDINE 300 MG/1
300 TABLET ORAL
Qty: 90 | Refills: 1 | Status: COMPLETED | COMMUNITY
Start: 2018-04-12 | End: 2018-12-21

## 2018-12-21 RX ORDER — PREDNISONE 10 MG/1
10 TABLET ORAL
Qty: 1 | Refills: 0 | Status: COMPLETED | COMMUNITY
Start: 2018-04-12 | End: 2018-12-21

## 2018-12-21 NOTE — REASON FOR VISIT
[Follow-Up] : a follow-up visit [FreeTextEntry1] :  allergic eosinophilia, allergic rhinitis, elevated IgE, prophylactic measure, severe persistent asthma and vitamin D deficiency

## 2018-12-21 NOTE — PROCEDURE
[FreeTextEntry1] : PFT - spi reveals mild obstructive dysfunction; FEV1 is 2.16 which is 74% of predicted, normal flow volume loop\par

## 2018-12-21 NOTE — ADDENDUM
[FreeTextEntry1] : All medical record entries made by crystal Dixon were at Dr. Zac Kelley's, direction and personally dictated by me on 12/21/2018. I have reviewed the chart and agree that the record accurately reflects my personal performance of the history, physical exam, assessment and plan. I have also personally directed, reviewed, and agree with the discharge instructions.\par

## 2018-12-21 NOTE — ASSESSMENT
[FreeTextEntry1] : Ms. Blue is a 32 year old female with a history of asthma / GERD / Allergy / overweight. Her asthma is active and is both IgE mediated and eosinophil mediated currently controlled on Nucala, but pregnant\par \par problem 1: severe persistent asthma, \par *climate related - GERD \par -Continue albuterol by the nebulizer QID\par -continue to use Ventolin PRN and before exercise\par -continue to use Advair 500 at 1 inhalation BId\par -Continue QVar 80 at 2 inhalations BID\par -Continue Spiriva 2 inhalations QD\par -continue to use Singulair 10 mg before bed\par -all meds needed were renewed\par -Inhaler technique reviewed as well as oral hygiene techniques reviewed with patient. Avoidance of cold air, extremes of temperature, rescue inhaler should be used before exercise. Order of medication reviewed with patient. Recommended use of a cool mist humidifier in the bedroom. \par \par problem 2: Allergic Asthma (Elevated IgE)\par -her elevated IgE makes her a candidate for Xolair\par \par Problem 3; Eosinophilic Asthma\par -on Nucala - can hold due to pregnancy - (crosses placental barrier -- Phase 4 study in progress; will reach out to [employer]) \par -The safety and efficacy of Nucala was established in three double-blind, randomized, placebo-controlled trials in patients with severe asthma. Compared to a placebo, patients with severe asthma receiving Nucala had fewer exacerbation requiring hospitalization and/or emergency department visits, and a longer time to first exacerbation. In addition, patients with severe asthma receiving Nucala or Fasenra experienced greater reductions in their daily maintenance oral corticosteroid dose, while maintaining asthma control compared with patients receiving placebo. Treatment with Nucala did not result in a significant improvement in lung function, as measured by the volume of air exhaled by patients in one second. The most common side effects include: headache, injection site reactions, back pain, weakness, and fatigue; hypersensitivity reactions can occur within hours or days including swelling of the face, mouth, and tongue, fainting, dizziness, hives, breathing problems, and rash; herpes zoster infections have occurred. The drug is a monoclonal antibody that inhibits interleukin-5 which helps regular eosinophils, a type of white blood cell that contributes to asthma. The over-production of eosinophils can cause inflammation in the lungs, increasing the frequency of asthma attacks. Patients must also take other medications, including high dose inhaled corticosteroids and at least one additional asthma drug\par \par problem 4: allergic rhinitis\par -continue to use Flonase 1 sniff each nostril BID\par -continue to use Xyzal 5 mg before bed \par -Environmental measures for allergies were encouraged including mattress and pillow cover, air purifier, and environmental controls.\par \par problem 5: low vitamin d\par -continue to use supplemental vitamin D\par -Has been associated with asthma exacerbations and increased allergic symptoms. The goal based on recent information is maintaining levels between 50-70 and low normal is 30. Recommended 50,000 units every two weeks to once a month depending on the level. \par \par problem 6: GERD-stable/controlled\par -add Protonix 40 mg QAM \par -Continue Zantac 300 mg QHS\par -Rule of 2s: avoid eating too much, eating too late, eating too spicy, eating two hours before bed\par -Things to avoid including overeating, spicy foods, tight clothing, eating within three hours of bed, this list is not all inclusive. \par -For treatment of reflux, possible options discussed including diet control, H2 blockers, PPIs, as well as coating motility agents discussed as treatment options. Timing of meals and proximity of last meal to sleep were discussed. If symptoms persist, a formal gastrointestinal evaluation is needed. \par \par problem 7: overweight\par -Weight loss, exercise, and diet control were discussed and are highly encouraged. Treatment options were given such as, aqua therapy, and contacting a nutritionist. Recommended to use the elliptical, stationary bike, less use of treadmill. \par \par problem 8: health maintenance \par -recommended yearly flu shot after October 15\par -recommended strep pneumonia vaccines: Prevnar-13 vaccine, followed by Pneumo vaccine 23 one year following\par -recommended early intervention for URIs\par -recommended regular osteoporosis evaluations\par -recommended early dermatological evaluations\par -recommended after the age of 50 to the age of 70, colonoscopy every 5 years \par \par \par Keep her F/U \par She is encouraged to call with any changes, concerns, or questions

## 2018-12-21 NOTE — HISTORY OF PRESENT ILLNESS
[FreeTextEntry1] : Ms. Blue is a 33 year old female with a history of allergic eosinophilia, allergic rhinitis, elevated IgE, prophylactic measure, severe persistent asthma and vitamin D deficiency presenting today for a follow up visit. Her chief complaint is\par \par -she states she generally feels very well\par -she states that she received 2/3 nucala shots\par -she is currently pregnant, 6-8 weeks pregnant, Dr Duff is OBGYN\par -she no longer takes medication for GERD (inactive)\par -she walks for exercise, weight is stable\par -she states her sleep is good\par -she does not use rescue inhaler or nebulizer since September\par -she says her bowels are regular\par -she states her asthma was active in the beginning of her previous pregnancy, but resolved at the end\par -she denies any headaches, nausea, vomiting, fever, chills, sweats, chest pain, chest pressure, diarrhea, constipation, dysphagia, dizziness, leg swelling, leg pain, itchy eyes, itchy ears, heartburn, reflux, PND, or sour taste in the mouth.\par

## 2018-12-22 ENCOUNTER — RX RENEWAL (OUTPATIENT)
Age: 33
End: 2018-12-22

## 2018-12-24 LAB
BACTERIA GENITAL AEROBE CULT: ABNORMAL
CANDIDA VAG CYTO: NOT DETECTED
G VAGINALIS+PREV SP MTYP VAG QL MICRO: DETECTED
HBA1C MFR BLD HPLC: 6.2 %
HPV HIGH+LOW RISK DNA PNL CVX: NOT DETECTED
T VAGINALIS VAG QL WET PREP: NOT DETECTED

## 2018-12-26 ENCOUNTER — CLINICAL ADVICE (OUTPATIENT)
Age: 33
End: 2018-12-26

## 2019-01-07 LAB — CYTOLOGY CVX/VAG DOC THIN PREP: NORMAL

## 2019-01-11 ENCOUNTER — NON-APPOINTMENT (OUTPATIENT)
Age: 34
End: 2019-01-11

## 2019-01-11 ENCOUNTER — APPOINTMENT (OUTPATIENT)
Dept: OBGYN | Facility: CLINIC | Age: 34
End: 2019-01-11
Payer: COMMERCIAL

## 2019-01-11 VITALS
HEIGHT: 62 IN | BODY MASS INDEX: 29.26 KG/M2 | WEIGHT: 159 LBS | DIASTOLIC BLOOD PRESSURE: 90 MMHG | SYSTOLIC BLOOD PRESSURE: 132 MMHG

## 2019-01-11 PROCEDURE — 0501F PRENATAL FLOW SHEET: CPT

## 2019-01-14 LAB
ABO + RH PNL BLD: NORMAL
BACTERIA UR CULT: NORMAL
BASOPHILS # BLD AUTO: 0.04 K/UL
BASOPHILS NFR BLD AUTO: 0.6 %
BLD GP AB SCN SERPL QL: NORMAL
CMV IGG SERPL QL: >10 U/ML
CMV IGG SERPL-IMP: POSITIVE
CMV IGM SERPL QL: <8 AU/ML
CMV IGM SERPL QL: NEGATIVE
EOSINOPHIL # BLD AUTO: 0.21 K/UL
EOSINOPHIL NFR BLD AUTO: 3.2 %
GLUCOSE 1H P 50 G GLC PO SERPL-MCNC: 108 MG/DL
HBV SURFACE AG SER QL: NONREACTIVE
HCT VFR BLD CALC: 40.7 %
HGB BLD-MCNC: 13.4 G/DL
HIV1+2 AB SPEC QL IA.RAPID: NONREACTIVE
IMM GRANULOCYTES NFR BLD AUTO: 0.2 %
LEAD BLD-MCNC: <1 UG/DL
LYMPHOCYTES # BLD AUTO: 1.43 K/UL
LYMPHOCYTES NFR BLD AUTO: 22.1 %
MAN DIFF?: NORMAL
MCHC RBC-ENTMCNC: 28.5 PG
MCHC RBC-ENTMCNC: 32.9 GM/DL
MCV RBC AUTO: 86.6 FL
MONOCYTES # BLD AUTO: 0.46 K/UL
MONOCYTES NFR BLD AUTO: 7.1 %
NEUTROPHILS # BLD AUTO: 4.32 K/UL
NEUTROPHILS NFR BLD AUTO: 66.8 %
PLATELET # BLD AUTO: 303 K/UL
RBC # BLD: 4.7 M/UL
RBC # FLD: 14.9 %
RUBV IGG FLD-ACNC: 1.4 INDEX
RUBV IGG SER-IMP: POSITIVE
T PALLIDUM AB SER QL IA: NEGATIVE
TSH SERPL-ACNC: 1.13 UIU/ML
VZV AB TITR SER: NEGATIVE
VZV IGG SER IF-ACNC: 85.7 INDEX
WBC # FLD AUTO: 6.47 K/UL

## 2019-01-15 ENCOUNTER — LABORATORY RESULT (OUTPATIENT)
Age: 34
End: 2019-01-15

## 2019-01-17 ENCOUNTER — APPOINTMENT (OUTPATIENT)
Dept: ANTEPARTUM | Facility: CLINIC | Age: 34
End: 2019-01-17
Payer: COMMERCIAL

## 2019-01-17 ENCOUNTER — ASOB RESULT (OUTPATIENT)
Age: 34
End: 2019-01-17

## 2019-01-17 PROCEDURE — 36416 COLLJ CAPILLARY BLOOD SPEC: CPT

## 2019-01-17 PROCEDURE — 76813 OB US NUCHAL MEAS 1 GEST: CPT

## 2019-01-17 PROCEDURE — 76801 OB US < 14 WKS SINGLE FETUS: CPT

## 2019-01-22 LAB
B19V IGG SER QL IA: 4.6 INDEX
B19V IGG+IGM SER-IMP: NORMAL
B19V IGG+IGM SER-IMP: POSITIVE
B19V IGM FLD-ACNC: 0.2 INDEX
B19V IGM SER-ACNC: NEGATIVE
CLARI ADDITIONAL INFO: NORMAL
CLARI CHROMOSOME 13: NORMAL
CLARI CHROMOSOME 18: NORMAL
CLARI CHROMOSOME 21: NORMAL
CLARI SEX CHROMOSOMES: NORMAL
CLARITEST NIPT: NORMAL
HGB A MFR BLD: 95.8 %
HGB A2 MFR BLD: 2.6 %
HGB F MFR BLD: 1.6 %
HGB FRACT BLD-IMP: NORMAL

## 2019-01-30 ENCOUNTER — ASOB RESULT (OUTPATIENT)
Age: 34
End: 2019-01-30

## 2019-01-30 ENCOUNTER — RESULT CHARGE (OUTPATIENT)
Age: 34
End: 2019-01-30

## 2019-01-30 ENCOUNTER — LABORATORY RESULT (OUTPATIENT)
Age: 34
End: 2019-01-30

## 2019-01-30 ENCOUNTER — APPOINTMENT (OUTPATIENT)
Dept: ANTEPARTUM | Facility: CLINIC | Age: 34
End: 2019-01-30

## 2019-01-30 ENCOUNTER — APPOINTMENT (OUTPATIENT)
Dept: MATERNAL FETAL MEDICINE | Facility: CLINIC | Age: 34
End: 2019-01-30
Payer: COMMERCIAL

## 2019-01-30 VITALS — SYSTOLIC BLOOD PRESSURE: 122 MMHG | WEIGHT: 156 LBS | BODY MASS INDEX: 28.53 KG/M2 | DIASTOLIC BLOOD PRESSURE: 80 MMHG

## 2019-01-30 LAB
BILIRUB UR QL STRIP: NORMAL
GLUCOSE UR-MCNC: NORMAL
HCG UR QL: 0.2 EU/DL
HGB UR QL STRIP.AUTO: NORMAL
KETONES UR-MCNC: NORMAL
LEUKOCYTE ESTERASE UR QL STRIP: NORMAL
NITRITE UR QL STRIP: NORMAL
PH UR STRIP: 5.5
PROT UR STRIP-MCNC: NORMAL
SP GR UR STRIP: 1.03

## 2019-01-30 PROCEDURE — G0108 DIAB MANAGE TRN  PER INDIV: CPT

## 2019-01-30 PROCEDURE — 99243 OFF/OP CNSLTJ NEW/EST LOW 30: CPT | Mod: 25

## 2019-02-07 ENCOUNTER — RX RENEWAL (OUTPATIENT)
Age: 34
End: 2019-02-07

## 2019-02-08 ENCOUNTER — APPOINTMENT (OUTPATIENT)
Dept: OBGYN | Facility: CLINIC | Age: 34
End: 2019-02-08
Payer: COMMERCIAL

## 2019-02-08 ENCOUNTER — NON-APPOINTMENT (OUTPATIENT)
Age: 34
End: 2019-02-08

## 2019-02-08 VITALS
SYSTOLIC BLOOD PRESSURE: 140 MMHG | DIASTOLIC BLOOD PRESSURE: 80 MMHG | HEIGHT: 62 IN | BODY MASS INDEX: 28.89 KG/M2 | WEIGHT: 157 LBS

## 2019-02-08 PROCEDURE — 0502F SUBSEQUENT PRENATAL CARE: CPT

## 2019-02-11 LAB
1ST TRIMESTER DATA: NORMAL
2ND TRIMESTER DATA: NORMAL
AFP PNL SERPL: NORMAL
AFP SERPL-ACNC: NORMAL
AFP SERPL-ACNC: NORMAL
B-HCG FREE SERPL-MCNC: NORMAL
CLINICAL BIOCHEMIST REVIEW: NORMAL
FREE BETA HCG 1ST TRIMESTER: NORMAL
INHIBIN A SERPL-MCNC: NORMAL
NOTES NTD: NORMAL
NT: NORMAL
PAPP-A SERPL-ACNC: NORMAL
U ESTRIOL SERPL-SCNC: NORMAL

## 2019-02-14 ENCOUNTER — APPOINTMENT (OUTPATIENT)
Dept: ANTEPARTUM | Facility: CLINIC | Age: 34
End: 2019-02-14
Payer: COMMERCIAL

## 2019-02-14 ENCOUNTER — ASOB RESULT (OUTPATIENT)
Age: 34
End: 2019-02-14

## 2019-02-14 PROCEDURE — 76805 OB US >/= 14 WKS SNGL FETUS: CPT

## 2019-03-06 ENCOUNTER — APPOINTMENT (OUTPATIENT)
Dept: MATERNAL FETAL MEDICINE | Facility: CLINIC | Age: 34
End: 2019-03-06
Payer: COMMERCIAL

## 2019-03-06 ENCOUNTER — ASOB RESULT (OUTPATIENT)
Age: 34
End: 2019-03-06

## 2019-03-06 VITALS — WEIGHT: 155 LBS | SYSTOLIC BLOOD PRESSURE: 140 MMHG | BODY MASS INDEX: 28.35 KG/M2 | DIASTOLIC BLOOD PRESSURE: 90 MMHG

## 2019-03-06 PROCEDURE — G0108 DIAB MANAGE TRN  PER INDIV: CPT

## 2019-03-06 PROCEDURE — 99213 OFFICE O/P EST LOW 20 MIN: CPT | Mod: 25

## 2019-03-08 ENCOUNTER — NON-APPOINTMENT (OUTPATIENT)
Age: 34
End: 2019-03-08

## 2019-03-08 ENCOUNTER — TRANSCRIPTION ENCOUNTER (OUTPATIENT)
Age: 34
End: 2019-03-08

## 2019-03-08 ENCOUNTER — APPOINTMENT (OUTPATIENT)
Dept: OBGYN | Facility: CLINIC | Age: 34
End: 2019-03-08
Payer: COMMERCIAL

## 2019-03-08 ENCOUNTER — OUTPATIENT (OUTPATIENT)
Dept: OUTPATIENT SERVICES | Facility: HOSPITAL | Age: 34
LOS: 1 days | End: 2019-03-08
Payer: COMMERCIAL

## 2019-03-08 ENCOUNTER — APPOINTMENT (OUTPATIENT)
Dept: CV DIAGNOSITCS | Facility: HOSPITAL | Age: 34
End: 2019-03-08

## 2019-03-08 VITALS
HEIGHT: 62 IN | DIASTOLIC BLOOD PRESSURE: 78 MMHG | WEIGHT: 157 LBS | BODY MASS INDEX: 28.89 KG/M2 | SYSTOLIC BLOOD PRESSURE: 128 MMHG

## 2019-03-08 DIAGNOSIS — I25.10 ATHEROSCLEROTIC HEART DISEASE OF NATIVE CORONARY ARTERY WITHOUT ANGINA PECTORIS: ICD-10-CM

## 2019-03-08 PROCEDURE — 93306 TTE W/DOPPLER COMPLETE: CPT

## 2019-03-08 PROCEDURE — 0502F SUBSEQUENT PRENATAL CARE: CPT

## 2019-03-08 PROCEDURE — 93306 TTE W/DOPPLER COMPLETE: CPT | Mod: 26

## 2019-03-13 ENCOUNTER — OTHER (OUTPATIENT)
Age: 34
End: 2019-03-13

## 2019-03-15 ENCOUNTER — ASOB RESULT (OUTPATIENT)
Age: 34
End: 2019-03-15

## 2019-03-15 ENCOUNTER — APPOINTMENT (OUTPATIENT)
Dept: MATERNAL FETAL MEDICINE | Facility: CLINIC | Age: 34
End: 2019-03-15
Payer: COMMERCIAL

## 2019-03-15 ENCOUNTER — APPOINTMENT (OUTPATIENT)
Dept: OPHTHALMOLOGY | Facility: CLINIC | Age: 34
End: 2019-03-15
Payer: COMMERCIAL

## 2019-03-15 PROCEDURE — 92004 COMPRE OPH EXAM NEW PT 1/>: CPT

## 2019-03-15 PROCEDURE — G0108 DIAB MANAGE TRN  PER INDIV: CPT

## 2019-03-18 ENCOUNTER — EMERGENCY (EMERGENCY)
Facility: HOSPITAL | Age: 34
LOS: 1 days | Discharge: HOME | End: 2019-03-18
Attending: STUDENT IN AN ORGANIZED HEALTH CARE EDUCATION/TRAINING PROGRAM
Payer: COMMERCIAL

## 2019-03-18 VITALS
HEIGHT: 62 IN | TEMPERATURE: 98 F | DIASTOLIC BLOOD PRESSURE: 107 MMHG | SYSTOLIC BLOOD PRESSURE: 155 MMHG | RESPIRATION RATE: 22 BRPM | HEART RATE: 95 BPM | WEIGHT: 154.98 LBS | OXYGEN SATURATION: 94 %

## 2019-03-18 PROCEDURE — 99284 EMERGENCY DEPT VISIT MOD MDM: CPT | Mod: 25

## 2019-03-18 NOTE — ED ADULT TRIAGE NOTE - NS ED TRIAGE AVPU SCALE
[Father] : father
Alert-The patient is alert, awake and responds to voice. The patient is oriented to time, place, and person. The triage nurse is able to obtain subjective information.

## 2019-03-18 NOTE — ED ADULT TRIAGE NOTE - CHIEF COMPLAINT QUOTE
shortness of breath h/o asthma inhaler and nebulizer not helping 20 weeks pregnant  h/o HTN on Procardia

## 2019-03-19 ENCOUNTER — MESSAGE (OUTPATIENT)
Age: 34
End: 2019-03-19

## 2019-03-19 VITALS
OXYGEN SATURATION: 94 % | TEMPERATURE: 98 F | SYSTOLIC BLOOD PRESSURE: 132 MMHG | HEART RATE: 92 BPM | RESPIRATION RATE: 16 BRPM | DIASTOLIC BLOOD PRESSURE: 87 MMHG

## 2019-03-19 LAB
ALBUMIN SERPL ELPH-MCNC: 3.8 G/DL — SIGNIFICANT CHANGE UP (ref 3.3–5)
ALP SERPL-CCNC: 77 U/L — SIGNIFICANT CHANGE UP (ref 40–120)
ALT FLD-CCNC: 14 U/L — SIGNIFICANT CHANGE UP (ref 10–45)
ANION GAP SERPL CALC-SCNC: 14 MMOL/L — SIGNIFICANT CHANGE UP (ref 5–17)
APPEARANCE UR: ABNORMAL
APTT BLD: 27 SEC — LOW (ref 27.5–36.3)
AST SERPL-CCNC: 14 U/L — SIGNIFICANT CHANGE UP (ref 10–40)
BASOPHILS # BLD AUTO: 0.1 K/UL — SIGNIFICANT CHANGE UP (ref 0–0.2)
BASOPHILS NFR BLD AUTO: 0.6 % — SIGNIFICANT CHANGE UP (ref 0–2)
BILIRUB SERPL-MCNC: 0.2 MG/DL — SIGNIFICANT CHANGE UP (ref 0.2–1.2)
BILIRUB UR-MCNC: NEGATIVE — SIGNIFICANT CHANGE UP
BUN SERPL-MCNC: 7 MG/DL — SIGNIFICANT CHANGE UP (ref 7–23)
CALCIUM SERPL-MCNC: 9.6 MG/DL — SIGNIFICANT CHANGE UP (ref 8.4–10.5)
CHLORIDE SERPL-SCNC: 102 MMOL/L — SIGNIFICANT CHANGE UP (ref 96–108)
CO2 SERPL-SCNC: 21 MMOL/L — LOW (ref 22–31)
COLOR SPEC: YELLOW — SIGNIFICANT CHANGE UP
CREAT SERPL-MCNC: 0.58 MG/DL — SIGNIFICANT CHANGE UP (ref 0.5–1.3)
DIFF PNL FLD: NEGATIVE — SIGNIFICANT CHANGE UP
EOSINOPHIL # BLD AUTO: 1 K/UL — HIGH (ref 0–0.5)
EOSINOPHIL NFR BLD AUTO: 9.6 % — HIGH (ref 0–6)
GLUCOSE SERPL-MCNC: 129 MG/DL — HIGH (ref 70–99)
GLUCOSE UR QL: NEGATIVE — SIGNIFICANT CHANGE UP
HCT VFR BLD CALC: 39.6 % — SIGNIFICANT CHANGE UP (ref 34.5–45)
HGB BLD-MCNC: 13.4 G/DL — SIGNIFICANT CHANGE UP (ref 11.5–15.5)
INR BLD: 0.98 RATIO — SIGNIFICANT CHANGE UP (ref 0.88–1.16)
KETONES UR-MCNC: NEGATIVE — SIGNIFICANT CHANGE UP
LDH SERPL L TO P-CCNC: 141 U/L — SIGNIFICANT CHANGE UP (ref 50–242)
LEUKOCYTE ESTERASE UR-ACNC: ABNORMAL
LYMPHOCYTES # BLD AUTO: 1.9 K/UL — SIGNIFICANT CHANGE UP (ref 1–3.3)
LYMPHOCYTES # BLD AUTO: 17.7 % — SIGNIFICANT CHANGE UP (ref 13–44)
MCHC RBC-ENTMCNC: 30.1 PG — SIGNIFICANT CHANGE UP (ref 27–34)
MCHC RBC-ENTMCNC: 33.7 GM/DL — SIGNIFICANT CHANGE UP (ref 32–36)
MCV RBC AUTO: 89.3 FL — SIGNIFICANT CHANGE UP (ref 80–100)
MONOCYTES # BLD AUTO: 0.5 K/UL — SIGNIFICANT CHANGE UP (ref 0–0.9)
MONOCYTES NFR BLD AUTO: 5 % — SIGNIFICANT CHANGE UP (ref 2–14)
NEUTROPHILS # BLD AUTO: 7.3 K/UL — SIGNIFICANT CHANGE UP (ref 1.8–7.4)
NEUTROPHILS NFR BLD AUTO: 67.2 % — SIGNIFICANT CHANGE UP (ref 43–77)
NITRITE UR-MCNC: NEGATIVE — SIGNIFICANT CHANGE UP
PH UR: 6.5 — SIGNIFICANT CHANGE UP (ref 5–8)
PLATELET # BLD AUTO: 321 K/UL — SIGNIFICANT CHANGE UP (ref 150–400)
POTASSIUM SERPL-MCNC: 3.6 MMOL/L — SIGNIFICANT CHANGE UP (ref 3.5–5.3)
POTASSIUM SERPL-SCNC: 3.6 MMOL/L — SIGNIFICANT CHANGE UP (ref 3.5–5.3)
PROT SERPL-MCNC: 7.3 G/DL — SIGNIFICANT CHANGE UP (ref 6–8.3)
PROT UR-MCNC: ABNORMAL
PROTHROM AB SERPL-ACNC: 11.3 SEC — SIGNIFICANT CHANGE UP (ref 10–12.9)
RBC # BLD: 4.44 M/UL — SIGNIFICANT CHANGE UP (ref 3.8–5.2)
RBC # FLD: 13.7 % — SIGNIFICANT CHANGE UP (ref 10.3–14.5)
SODIUM SERPL-SCNC: 137 MMOL/L — SIGNIFICANT CHANGE UP (ref 135–145)
SP GR SPEC: 1.01 — SIGNIFICANT CHANGE UP (ref 1.01–1.02)
URATE SERPL-MCNC: 3.3 MG/DL — SIGNIFICANT CHANGE UP (ref 2.5–7)
UROBILINOGEN FLD QL: NEGATIVE — SIGNIFICANT CHANGE UP
WBC # BLD: 10.8 K/UL — HIGH (ref 3.8–10.5)
WBC # FLD AUTO: 10.8 K/UL — HIGH (ref 3.8–10.5)

## 2019-03-19 PROCEDURE — 80053 COMPREHEN METABOLIC PANEL: CPT

## 2019-03-19 PROCEDURE — 83615 LACTATE (LD) (LDH) ENZYME: CPT

## 2019-03-19 PROCEDURE — 84550 ASSAY OF BLOOD/URIC ACID: CPT

## 2019-03-19 PROCEDURE — 94640 AIRWAY INHALATION TREATMENT: CPT

## 2019-03-19 PROCEDURE — 85730 THROMBOPLASTIN TIME PARTIAL: CPT

## 2019-03-19 PROCEDURE — 85610 PROTHROMBIN TIME: CPT

## 2019-03-19 PROCEDURE — 99284 EMERGENCY DEPT VISIT MOD MDM: CPT | Mod: 25

## 2019-03-19 PROCEDURE — 87086 URINE CULTURE/COLONY COUNT: CPT

## 2019-03-19 PROCEDURE — 85027 COMPLETE CBC AUTOMATED: CPT

## 2019-03-19 PROCEDURE — 81001 URINALYSIS AUTO W/SCOPE: CPT

## 2019-03-19 RX ORDER — IPRATROPIUM/ALBUTEROL SULFATE 18-103MCG
3 AEROSOL WITH ADAPTER (GRAM) INHALATION
Qty: 0 | Refills: 0 | Status: COMPLETED | OUTPATIENT
Start: 2019-03-19 | End: 2019-03-19

## 2019-03-19 RX ORDER — CEPHALEXIN 500 MG
500 CAPSULE ORAL
Qty: 0 | Refills: 0 | Status: DISCONTINUED | OUTPATIENT
Start: 2019-03-19 | End: 2019-03-22

## 2019-03-19 RX ORDER — CEPHALEXIN 500 MG
1 CAPSULE ORAL
Qty: 14 | Refills: 0 | OUTPATIENT
Start: 2019-03-19 | End: 2019-03-25

## 2019-03-19 RX ORDER — ALBUTEROL 90 UG/1
2.5 AEROSOL, METERED ORAL ONCE
Qty: 0 | Refills: 0 | Status: COMPLETED | OUTPATIENT
Start: 2019-03-19 | End: 2019-03-19

## 2019-03-19 RX ADMIN — Medication 50 MILLIGRAM(S): at 01:23

## 2019-03-19 RX ADMIN — Medication 3 MILLILITER(S): at 01:20

## 2019-03-19 RX ADMIN — ALBUTEROL 2.5 MILLIGRAM(S): 90 AEROSOL, METERED ORAL at 02:56

## 2019-03-19 RX ADMIN — Medication 500 MILLIGRAM(S): at 02:56

## 2019-03-19 NOTE — ED PROVIDER NOTE - PHYSICAL EXAMINATION
Gen: Well appearing, NAD  Head: NCAT  HEENT: PERRL, MMM, normal conjunctiva, anicteric, neck supple  Lung: Dec air entry, inspiratory wheezing in all lung fields  CV: RRR, no murmurs  Abd: soft gravid abd, NTND, no rebound or guarding, no CVAT  MSK: No edema, no visible deformities  Neuro: CN II-XII grossly intact. 5/5 strength and normal sensation in all extremities. Ambulatory with stable gait.   Skin: Warm and dry, no evidence of rash  Psych: normal mood and affect

## 2019-03-19 NOTE — ED ADULT NURSE NOTE - NSIMPLEMENTINTERV_GEN_ALL_ED
Implemented All Universal Safety Interventions:  [place] to call system. Call bell, personal items and telephone within reach. Instruct patient to call for assistance. Room bathroom lighting operational. Non-slip footwear when patient is off stretcher. Physically safe environment: no spills, clutter or unnecessary equipment. Stretcher in lowest position, wheels locked, appropriate side rails in place.

## 2019-03-19 NOTE — CONSULT NOTE ADULT - ASSESSMENT
32 yo  @ 20w6d GA ([facility] 19) PMH cHTN, asthma, DM2 presents to the ED due to SOB overnight.  most likely exacerbation of cHTN since pt is 20w6d  - albuterol/ipratropium nebulizer  - HELLP labs are wnl    Yessica Acosta PGY2  seen w Dr Noble 32 yo  @ 20w6d GA ([facility] 19) PMH cHTN, asthma, DM2 presents to the ED due to SOB overnight.  most likely exacerbation of cHTN since pt is 20w6d  - albuterol/ipratropium nebulizer  - HELLP labs are wnl  - bedside doppler for fetal heart rate     Yessica Dave PGY2  seen w Dr Noble 34 yo  @ 20w6d GA ([facility] 19) PMH cHTN, asthma, DM2 presents to the ED due to SOB overnight.  most likely exacerbation of cHTN since pt is 20w6d  - albuterol/ipratropium nebulizer  - HELLP labs are wnl  - FHR wnl- 160s  - BPs after albuterol are 120-143/88-94  - pt to f/u in office this week for BP check    Yessica Acosta PGY2  seen w Dr Noble  d/w Dr Patterson

## 2019-03-19 NOTE — ED PROVIDER NOTE - NS ED ROS FT
AS PER HPI, otherwise:  Constitutional: no fever, no headache, no lightheadedness, no dizziness  Eyes: no conjunctivitis, no vision changes  Ears: no ear pain   Nose: no nasal congestion, Mouth/Throat: no throat pain, Neck: no stiffness  Cardiovascular: no chest pain, no palpitations  Chest: see hpi  Gastrointestinal: no abdominal pain, no vomiting and diarrhea  MSK: no joint pain, no swelling of extremities  : no dysuria  Skin: no rash  Neuro: no LOC, no focal weakness, no sensory changes

## 2019-03-19 NOTE — CONSULT NOTE ADULT - SUBJECTIVE AND OBJECTIVE BOX
JAMAAL SHEPHERD  33y  Female [id number]    HPI:  32 yo  @ 20w6d GA ([facility] 19) PMH cHTN, asthma, DM2 presents to the ED due to SOB overnight.  Found to have elevated BPs in ED- 150s/90-100s.  Denies headache, chest pain,epigastric and RUQ pain     Name of GYN Physician: Dr Duff    POB:  2014, - IOL due to PEC @ 38w    Pgyn: Denies fibroids, cysts, STI's, Abnormal pap smears   PMH:  cHTN, asthma, DM2   PSH: none    Home meds: procardia 90xl, ASA 81, metformin 500 mg qD    Social History:  Denies smoking use, drug use, alcohol use.     Vital Signs Last 24 Hrs  T(C): 36.8 (19 Mar 2019 00:45), Max: 36.8 (19 Mar 2019 00:45)  T(F): 98.3 (19 Mar 2019 00:45), Max: 98.3 (19 Mar 2019 00:45)  HR: 98 (19 Mar 2019 01:17) (95 - 99)  BP: 141/96 (19 Mar 2019 01:59) (136/88 - 155/107)  BP(mean): --  RR: 18 (19 Mar 2019 01:59) (18 - 22)  SpO2: 95% (19 Mar 2019 01:59) (94% - 95%)    Physical Exam:   General: sitting comfortably in bed, NAD   Abd: Soft, non-tender, gravid.  Ext: non-tender b/l, no edema     LABS:                            13.4   10.8  )-----------( 321      ( 19 Mar 2019 01:21 )             39.6     03-    137  |  102  |  7   ----------------------------<  129<H>  3.6   |  21<L>  |  0.58    Ca    9.6      19 Mar 2019 01:21    TPro  7.3  /  Alb  3.8  /  TBili  0.2  /  DBili  x   /  AST  14  /  ALT  14  /  AlkPhos  77  -    I&O's Detail    PT/INR - ( 19 Mar 2019 01:21 )   PT: 11.3 sec;   INR: 0.98 ratio         PTT - ( 19 Mar 2019 01:21 )  PTT:27.0 sec JAMAAL SHEPHERD  33y  Female [id number]    HPI:  32 yo  @ 20w6d GA ([facility] 19) PMH cHTN, asthma, DM2 presents to the ED due to SOB overnight.  Found to have elevated BPs in ED- 150s/90-100s.  Denies headache, chest pain,epigastric and RUQ pain     Name of GYN Physician: Dr Duff    POB:  2014, - IOL due to PEC @ 38w    Pgyn: Denies fibroids, cysts, STI's, Abnormal pap smears   PMH:  cHTN, asthma, DM2   PSH: none    Home meds: procardia 90xl, ASA 81, metformin 500 mg qD    Social History:  Denies smoking use, drug use, alcohol use.     Vital Signs Last 24 Hrs  T(C): 36.8 (19 Mar 2019 00:45), Max: 36.8 (19 Mar 2019 00:45)  T(F): 98.3 (19 Mar 2019 00:45), Max: 98.3 (19 Mar 2019 00:45)  HR: 98 (19 Mar 2019 01:17) (95 - 99)  BP: 141/96 (19 Mar 2019 01:59) (136/88 - 155/107)  BP(mean): --  RR: 18 (19 Mar 2019 01:59) (18 - 22)  SpO2: 95% (19 Mar 2019 01:59) (94% - 95%)    Physical Exam:   General: sitting comfortably in bed, NAD   Abd: Soft, non-tender, gravid.  Ext: non-tender b/l, no edema     FHT: 160s (per ED)     LABS:                            13.4   10.8  )-----------( 321      ( 19 Mar 2019 01:21 )             39.6     03-    137  |  102  |  7   ----------------------------<  129<H>  3.6   |  21<L>  |  0.58    Ca    9.6      19 Mar 2019 01:21    TPro  7.3  /  Alb  3.8  /  TBili  0.2  /  DBili  x   /  AST  14  /  ALT  14  /  AlkPhos  77  -    I&O's Detail    PT/INR - ( 19 Mar 2019 01:21 )   PT: 11.3 sec;   INR: 0.98 ratio         PTT - ( 19 Mar 2019 01:21 )  PTT:27.0 sec

## 2019-03-19 NOTE — ED PROVIDER NOTE - NSFOLLOWUPINSTRUCTIONS_ED_ALL_ED_FT
- Return to the ED for new or worsening symptoms    - Use your nebulizer as needed for shortness of breath. If your shortness of breath does not resolve after 3 doses, please return to the ED    - Follow up with your OBGYN this week and monitor your blood pressure at home. If your pressure is greater than 140 please return to the ED.

## 2019-03-19 NOTE — ED PROVIDER NOTE - OBJECTIVE STATEMENT
32yo F hx asthma never intubated, here with sob since this evening w/ chest tightness and wheezing typical of her asthma. Used nebulizer to little relief. Pt is  20 weeks pregnant follows with Dr. Duff and found in triage to be hypertensive in the 140s. States had preeclampsia in her prior pregnancy and has been on procardia since. No ha, blurry vision, cp, abd pain, n/v/d, weakness/numbness. 34yo F hx asthma never intubated, here with sob since this evening w/ chest tightness and wheezing typical of her asthma. Used nebulizer to little relief. Pt is  20 weeks pregnant follows with Dr. Duff and found in triage to be hypertensive in the 140s. States had preeclampsia in her prior pregnancy and has been on procardia since with nl blood pressures. No ha, blurry vision, cp, abd pain, n/v/d, weakness/numbness.

## 2019-03-19 NOTE — ED ADULT NURSE NOTE - OBJECTIVE STATEMENT
33 year old female pt presented to the ED co SOB h/o asthma, states she took 2 puffs of her inhaler with little relief, pt is 20 weeks pregnant with a h/o preeclampsia on procardia 90mg daily, pt denies any abd pain/n/v/d/sick contact , O2 sat 94% on RA, states O2 helps, denies cough, BP in ED is 146/100, no respiratory distress noted, pt denies any headache

## 2019-03-19 NOTE — ED PROVIDER NOTE - PROGRESS NOTE DETAILS
Jered Carolina DO: OB at bedside evaluating Hira Snow DO PGY1 - Pt ambulated without return of sx. Feels asympomatic. Lungs good air entry b/l with minimal occasional wheeze. . BP normalized. Hira Snow DO PGY1 - Pt ambulated without return of sx. Feels asymptomatic. Lungs good air entry b/l with minimal occasional wheeze. . BP normalized.  Jered Carolina DO: agree with above. OB rec appreciatedand BP spontaneously improved. labs notable for UA with large leukocytosis, +bacteria and leuk est. pt asymptomatic but given pregnancy will tx, Ucx sent. will cont with abx, pred as outpt. Return precautions were discussed with patient

## 2019-03-20 ENCOUNTER — CHART COPY (OUTPATIENT)
Age: 34
End: 2019-03-20

## 2019-03-20 ENCOUNTER — INPATIENT (INPATIENT)
Facility: HOSPITAL | Age: 34
LOS: 1 days | Discharge: HOME | DRG: 832 | End: 2019-03-22
Attending: OBSTETRICS & GYNECOLOGY | Admitting: OBSTETRICS & GYNECOLOGY
Payer: COMMERCIAL

## 2019-03-20 VITALS
OXYGEN SATURATION: 87 % | RESPIRATION RATE: 28 BRPM | TEMPERATURE: 98 F | WEIGHT: 156.97 LBS | HEIGHT: 62 IN | DIASTOLIC BLOOD PRESSURE: 103 MMHG | SYSTOLIC BLOOD PRESSURE: 158 MMHG | HEART RATE: 119 BPM

## 2019-03-20 DIAGNOSIS — J45.909 UNSPECIFIED ASTHMA, UNCOMPLICATED: ICD-10-CM

## 2019-03-20 LAB
ALBUMIN SERPL ELPH-MCNC: 3.9 G/DL — SIGNIFICANT CHANGE UP (ref 3.3–5)
ALP SERPL-CCNC: 78 U/L — SIGNIFICANT CHANGE UP (ref 40–120)
ALT FLD-CCNC: 12 U/L — SIGNIFICANT CHANGE UP (ref 10–45)
ANION GAP SERPL CALC-SCNC: 15 MMOL/L — SIGNIFICANT CHANGE UP (ref 5–17)
APPEARANCE UR: ABNORMAL
AST SERPL-CCNC: 14 U/L — SIGNIFICANT CHANGE UP (ref 10–40)
BACTERIA # UR AUTO: ABNORMAL
BASE EXCESS BLDV CALC-SCNC: -1.4 MMOL/L — SIGNIFICANT CHANGE UP (ref -2–2)
BASOPHILS # BLD AUTO: 0.1 K/UL — SIGNIFICANT CHANGE UP (ref 0–0.2)
BASOPHILS NFR BLD AUTO: 0.6 % — SIGNIFICANT CHANGE UP (ref 0–2)
BILIRUB SERPL-MCNC: 0.2 MG/DL — SIGNIFICANT CHANGE UP (ref 0.2–1.2)
BILIRUB UR-MCNC: NEGATIVE — SIGNIFICANT CHANGE UP
BLD GP AB SCN SERPL QL: NEGATIVE — SIGNIFICANT CHANGE UP
BUN SERPL-MCNC: 8 MG/DL — SIGNIFICANT CHANGE UP (ref 7–23)
CA-I SERPL-SCNC: 1.18 MMOL/L — SIGNIFICANT CHANGE UP (ref 1.12–1.3)
CALCIUM SERPL-MCNC: 9.7 MG/DL — SIGNIFICANT CHANGE UP (ref 8.4–10.5)
CHLORIDE BLDV-SCNC: 110 MMOL/L — HIGH (ref 96–108)
CHLORIDE SERPL-SCNC: 104 MMOL/L — SIGNIFICANT CHANGE UP (ref 96–108)
CO2 BLDV-SCNC: 23 MMOL/L — SIGNIFICANT CHANGE UP (ref 22–30)
CO2 SERPL-SCNC: 19 MMOL/L — LOW (ref 22–31)
COLOR SPEC: SIGNIFICANT CHANGE UP
CREAT SERPL-MCNC: 0.56 MG/DL — SIGNIFICANT CHANGE UP (ref 0.5–1.3)
CULTURE RESULTS: SIGNIFICANT CHANGE UP
DIFF PNL FLD: NEGATIVE — SIGNIFICANT CHANGE UP
EOSINOPHIL # BLD AUTO: 1.5 K/UL — HIGH (ref 0–0.5)
EOSINOPHIL NFR BLD AUTO: 12.2 % — HIGH (ref 0–6)
EPI CELLS # UR: 5 /HPF — SIGNIFICANT CHANGE UP
GAS PNL BLDV: 134 MMOL/L — LOW (ref 136–145)
GAS PNL BLDV: SIGNIFICANT CHANGE UP
GLUCOSE BLDV-MCNC: 88 MG/DL — SIGNIFICANT CHANGE UP (ref 70–99)
GLUCOSE SERPL-MCNC: 92 MG/DL — SIGNIFICANT CHANGE UP (ref 70–99)
GLUCOSE UR QL: NEGATIVE — SIGNIFICANT CHANGE UP
HCO3 BLDV-SCNC: 22 MMOL/L — SIGNIFICANT CHANGE UP (ref 21–29)
HCT VFR BLD CALC: 40.3 % — SIGNIFICANT CHANGE UP (ref 34.5–45)
HCT VFR BLDA CALC: 42 % — SIGNIFICANT CHANGE UP (ref 39–50)
HGB BLD CALC-MCNC: 13.8 G/DL — SIGNIFICANT CHANGE UP (ref 11.5–15.5)
HGB BLD-MCNC: 13.8 G/DL — SIGNIFICANT CHANGE UP (ref 11.5–15.5)
HYALINE CASTS # UR AUTO: 1 /LPF — SIGNIFICANT CHANGE UP (ref 0–2)
KETONES UR-MCNC: ABNORMAL
LACTATE BLDV-MCNC: 1.5 MMOL/L — SIGNIFICANT CHANGE UP (ref 0.7–2)
LDH SERPL L TO P-CCNC: 131 U/L — SIGNIFICANT CHANGE UP (ref 50–242)
LEUKOCYTE ESTERASE UR-ACNC: ABNORMAL
LYMPHOCYTES # BLD AUTO: 19.3 % — SIGNIFICANT CHANGE UP (ref 13–44)
LYMPHOCYTES # BLD AUTO: 2.3 K/UL — SIGNIFICANT CHANGE UP (ref 1–3.3)
MCHC RBC-ENTMCNC: 30.4 PG — SIGNIFICANT CHANGE UP (ref 27–34)
MCHC RBC-ENTMCNC: 34.1 GM/DL — SIGNIFICANT CHANGE UP (ref 32–36)
MCV RBC AUTO: 89 FL — SIGNIFICANT CHANGE UP (ref 80–100)
MONOCYTES # BLD AUTO: 0.7 K/UL — SIGNIFICANT CHANGE UP (ref 0–0.9)
MONOCYTES NFR BLD AUTO: 5.5 % — SIGNIFICANT CHANGE UP (ref 2–14)
NEUTROPHILS # BLD AUTO: 7.5 K/UL — HIGH (ref 1.8–7.4)
NEUTROPHILS NFR BLD AUTO: 62.4 % — SIGNIFICANT CHANGE UP (ref 43–77)
NITRITE UR-MCNC: NEGATIVE — SIGNIFICANT CHANGE UP
NT-PROBNP SERPL-SCNC: 5 PG/ML — SIGNIFICANT CHANGE UP (ref 0–300)
PCO2 BLDV: 34 MMHG — LOW (ref 35–50)
PH BLDV: 7.42 — SIGNIFICANT CHANGE UP (ref 7.35–7.45)
PH UR: 5.5 — SIGNIFICANT CHANGE UP (ref 5–8)
PLATELET # BLD AUTO: 314 K/UL — SIGNIFICANT CHANGE UP (ref 150–400)
PO2 BLDV: 43 MMHG — SIGNIFICANT CHANGE UP (ref 25–45)
POTASSIUM BLDV-SCNC: 4 MMOL/L — SIGNIFICANT CHANGE UP (ref 3.5–5.3)
POTASSIUM SERPL-MCNC: 3.8 MMOL/L — SIGNIFICANT CHANGE UP (ref 3.5–5.3)
POTASSIUM SERPL-SCNC: 3.8 MMOL/L — SIGNIFICANT CHANGE UP (ref 3.5–5.3)
PROT SERPL-MCNC: 7.1 G/DL — SIGNIFICANT CHANGE UP (ref 6–8.3)
PROT UR-MCNC: SIGNIFICANT CHANGE UP
RAPID RVP RESULT: SIGNIFICANT CHANGE UP
RBC # BLD: 4.53 M/UL — SIGNIFICANT CHANGE UP (ref 3.8–5.2)
RBC # FLD: 13.9 % — SIGNIFICANT CHANGE UP (ref 10.3–14.5)
RBC CASTS # UR COMP ASSIST: 2 /HPF — SIGNIFICANT CHANGE UP (ref 0–4)
RH IG SCN BLD-IMP: POSITIVE — SIGNIFICANT CHANGE UP
SAO2 % BLDV: 74 % — SIGNIFICANT CHANGE UP (ref 67–88)
SODIUM SERPL-SCNC: 138 MMOL/L — SIGNIFICANT CHANGE UP (ref 135–145)
SP GR SPEC: 1.02 — SIGNIFICANT CHANGE UP (ref 1.01–1.02)
SPECIMEN SOURCE: SIGNIFICANT CHANGE UP
TROPONIN T, HIGH SENSITIVITY RESULT: <6 NG/L — SIGNIFICANT CHANGE UP (ref 0–51)
UROBILINOGEN FLD QL: NEGATIVE — SIGNIFICANT CHANGE UP
WBC # BLD: 12 K/UL — HIGH (ref 3.8–10.5)
WBC # FLD AUTO: 12 K/UL — HIGH (ref 3.8–10.5)
WBC UR QL: 66 /HPF — HIGH (ref 0–5)

## 2019-03-20 PROCEDURE — 99291 CRITICAL CARE FIRST HOUR: CPT

## 2019-03-20 PROCEDURE — 93010 ELECTROCARDIOGRAM REPORT: CPT

## 2019-03-20 RX ORDER — PANTOPRAZOLE SODIUM 20 MG/1
40 TABLET, DELAYED RELEASE ORAL
Qty: 0 | Refills: 0 | Status: DISCONTINUED | OUTPATIENT
Start: 2019-03-20 | End: 2019-03-20

## 2019-03-20 RX ORDER — DEXTROSE 50 % IN WATER 50 %
25 SYRINGE (ML) INTRAVENOUS ONCE
Qty: 0 | Refills: 0 | Status: DISCONTINUED | OUTPATIENT
Start: 2019-03-20 | End: 2019-03-22

## 2019-03-20 RX ORDER — SODIUM CHLORIDE 9 MG/ML
1000 INJECTION, SOLUTION INTRAVENOUS
Qty: 0 | Refills: 0 | Status: DISCONTINUED | OUTPATIENT
Start: 2019-03-20 | End: 2019-03-22

## 2019-03-20 RX ORDER — ALBUTEROL 90 UG/1
1 AEROSOL, METERED ORAL EVERY 4 HOURS
Qty: 0 | Refills: 0 | Status: DISCONTINUED | OUTPATIENT
Start: 2019-03-20 | End: 2019-03-20

## 2019-03-20 RX ORDER — MONTELUKAST 4 MG/1
10 TABLET, CHEWABLE ORAL DAILY
Qty: 0 | Refills: 0 | Status: DISCONTINUED | OUTPATIENT
Start: 2019-03-20 | End: 2019-03-22

## 2019-03-20 RX ORDER — IPRATROPIUM/ALBUTEROL SULFATE 18-103MCG
3 AEROSOL WITH ADAPTER (GRAM) INHALATION
Qty: 0 | Refills: 0 | Status: COMPLETED | OUTPATIENT
Start: 2019-03-20 | End: 2019-03-20

## 2019-03-20 RX ORDER — TIOTROPIUM BROMIDE 18 UG/1
1 CAPSULE ORAL; RESPIRATORY (INHALATION) DAILY
Qty: 0 | Refills: 0 | Status: DISCONTINUED | OUTPATIENT
Start: 2019-03-20 | End: 2019-03-22

## 2019-03-20 RX ORDER — SODIUM CHLORIDE 9 MG/ML
500 INJECTION INTRAMUSCULAR; INTRAVENOUS; SUBCUTANEOUS ONCE
Qty: 0 | Refills: 0 | Status: COMPLETED | OUTPATIENT
Start: 2019-03-20 | End: 2019-03-20

## 2019-03-20 RX ORDER — MAGNESIUM SULFATE 500 MG/ML
2 VIAL (ML) INJECTION ONCE
Qty: 0 | Refills: 0 | Status: COMPLETED | OUTPATIENT
Start: 2019-03-20 | End: 2019-03-20

## 2019-03-20 RX ORDER — ALBUTEROL 90 UG/1
1 AEROSOL, METERED ORAL
Qty: 0 | Refills: 0 | Status: DISCONTINUED | OUTPATIENT
Start: 2019-03-20 | End: 2019-03-22

## 2019-03-20 RX ORDER — TIOTROPIUM BROMIDE 18 UG/1
1 CAPSULE ORAL; RESPIRATORY (INHALATION) DAILY
Qty: 0 | Refills: 0 | Status: DISCONTINUED | OUTPATIENT
Start: 2019-03-20 | End: 2019-03-20

## 2019-03-20 RX ORDER — IPRATROPIUM/ALBUTEROL SULFATE 18-103MCG
3 AEROSOL WITH ADAPTER (GRAM) INHALATION ONCE
Qty: 0 | Refills: 0 | Status: COMPLETED | OUTPATIENT
Start: 2019-03-20 | End: 2019-03-20

## 2019-03-20 RX ORDER — ALBUTEROL 90 UG/1
2 AEROSOL, METERED ORAL EVERY 6 HOURS
Qty: 0 | Refills: 0 | Status: DISCONTINUED | OUTPATIENT
Start: 2019-03-20 | End: 2019-03-20

## 2019-03-20 RX ORDER — DEXTROSE 50 % IN WATER 50 %
15 SYRINGE (ML) INTRAVENOUS ONCE
Qty: 0 | Refills: 0 | Status: DISCONTINUED | OUTPATIENT
Start: 2019-03-20 | End: 2019-03-22

## 2019-03-20 RX ORDER — FLUTICASONE PROPIONATE 50 MCG
1 SPRAY, SUSPENSION NASAL DAILY
Qty: 0 | Refills: 0 | Status: DISCONTINUED | OUTPATIENT
Start: 2019-03-20 | End: 2019-03-20

## 2019-03-20 RX ORDER — FOLIC ACID 0.8 MG
1 TABLET ORAL DAILY
Qty: 0 | Refills: 0 | Status: DISCONTINUED | OUTPATIENT
Start: 2019-03-20 | End: 2019-03-22

## 2019-03-20 RX ORDER — GLUCAGON INJECTION, SOLUTION 0.5 MG/.1ML
1 INJECTION, SOLUTION SUBCUTANEOUS ONCE
Qty: 0 | Refills: 0 | Status: DISCONTINUED | OUTPATIENT
Start: 2019-03-20 | End: 2019-03-22

## 2019-03-20 RX ORDER — HYDRALAZINE HCL 50 MG
10 TABLET ORAL ONCE
Qty: 0 | Refills: 0 | Status: COMPLETED | OUTPATIENT
Start: 2019-03-20 | End: 2019-03-20

## 2019-03-20 RX ORDER — DEXTROSE 50 % IN WATER 50 %
12.5 SYRINGE (ML) INTRAVENOUS ONCE
Qty: 0 | Refills: 0 | Status: DISCONTINUED | OUTPATIENT
Start: 2019-03-20 | End: 2019-03-22

## 2019-03-20 RX ORDER — INSULIN LISPRO 100/ML
VIAL (ML) SUBCUTANEOUS
Qty: 0 | Refills: 0 | Status: DISCONTINUED | OUTPATIENT
Start: 2019-03-20 | End: 2019-03-22

## 2019-03-20 RX ORDER — IPRATROPIUM/ALBUTEROL SULFATE 18-103MCG
3 AEROSOL WITH ADAPTER (GRAM) INHALATION EVERY 6 HOURS
Qty: 0 | Refills: 0 | Status: DISCONTINUED | OUTPATIENT
Start: 2019-03-20 | End: 2019-03-22

## 2019-03-20 RX ORDER — IPRATROPIUM/ALBUTEROL SULFATE 18-103MCG
3 AEROSOL WITH ADAPTER (GRAM) INHALATION EVERY 12 HOURS
Qty: 0 | Refills: 0 | Status: DISCONTINUED | OUTPATIENT
Start: 2019-03-20 | End: 2019-03-20

## 2019-03-20 RX ORDER — NIFEDIPINE 30 MG
90 TABLET, EXTENDED RELEASE 24 HR ORAL DAILY
Qty: 0 | Refills: 0 | Status: DISCONTINUED | OUTPATIENT
Start: 2019-03-20 | End: 2019-03-22

## 2019-03-20 RX ORDER — ALBUTEROL 90 UG/1
1 AEROSOL, METERED ORAL EVERY 4 HOURS
Qty: 0 | Refills: 0 | Status: DISCONTINUED | OUTPATIENT
Start: 2019-03-20 | End: 2019-03-22

## 2019-03-20 RX ORDER — BUDESONIDE AND FORMOTEROL FUMARATE DIHYDRATE 160; 4.5 UG/1; UG/1
2 AEROSOL RESPIRATORY (INHALATION)
Qty: 0 | Refills: 0 | Status: DISCONTINUED | OUTPATIENT
Start: 2019-03-20 | End: 2019-03-22

## 2019-03-20 RX ADMIN — Medication 3 MILLILITER(S): at 19:50

## 2019-03-20 RX ADMIN — Medication 50 GRAM(S): at 20:01

## 2019-03-20 RX ADMIN — Medication 10 MILLIGRAM(S): at 20:14

## 2019-03-20 RX ADMIN — Medication 3 MILLILITER(S): at 19:40

## 2019-03-20 RX ADMIN — Medication 3 MILLILITER(S): at 20:17

## 2019-03-20 RX ADMIN — Medication 125 MILLIGRAM(S): at 19:53

## 2019-03-20 RX ADMIN — SODIUM CHLORIDE 500 MILLILITER(S): 9 INJECTION INTRAMUSCULAR; INTRAVENOUS; SUBCUTANEOUS at 19:56

## 2019-03-20 NOTE — ED ADULT NURSE NOTE - NSIMPLEMENTINTERV_GEN_ALL_ED
Implemented All Universal Safety Interventions:  [place] to call system. Call bell, personal items and telephone within reach. Instruct patient to call for assistance. Room bathroom lighting operational. Non-slip footwear when patient is off stretcher. Physically safe environment: no spills, clutter or unnecessary equipment. Stretcher in lowest position, wheels locked, appropriate side rails in place.

## 2019-03-20 NOTE — H&P ADULT - ASSESSMENT
34 yo  at 21w0d presents to the ED due to SOB, wheezing.  Pt has a PMH of severe persistent asthma, GERD, cHTN and DM2.   - admit to antepartum  - pulmonology consult  - BPs q15 min  - HELLP labs    Yessica Acosta PGY2  d/w Dr Vaca 34 yo  at 21w0d presents to the ED due to SOB, wheezing.  Pt has a PMH of severe persistent asthma, GERD, cHTN and DM2.   - admit to antepartum  - pulmonology consult  - BPs q15 min, continuous pulse ox  - HELLP labs  - FHR qday  - SCDs for DVT ppx    Yessica Dave PGY2  d/w Dr Vaca 34 yo  at 21w0d presents to the ED due to SOB, wheezing.  Pt has a PMH of severe persistent asthma, GERD, cHTN and DM2.   - admit to antepartum  - pulmonology consult  - BPs q15 min, continuous pulse ox  - HELLP labs  - FHR qday  - SCDs for DVT ppx  - FS after meals and at bedtime, low ISS  - c/w procardia 90mg for cHTN   - RVP negative     Yessica Dave PGY2  d/w Dr Vaca

## 2019-03-20 NOTE — H&P ADULT - NSHPPHYSICALEXAM_GEN_ALL_CORE
Vital Signs Last 24 Hrs  T(C): 36.8 (20 Mar 2019 18:57), Max: 36.8 (20 Mar 2019 18:57)  T(F): 98.2 (20 Mar 2019 18:57), Max: 98.2 (20 Mar 2019 18:57)  HR: 113 (20 Mar 2019 20:18) (112 - 128)  BP: 170/106 (20 Mar 2019 20:18) (157/116 - 173/112)  BP(mean): --  RR: 23 (20 Mar 2019 20:18) (23 - 34)  SpO2: 95% (20 Mar 2019 20:18) (87% - 95%)    Physical Exam:   General: sitting comfortably in bed, NAD   CV: RR, S1S2 present, no m/r/g  Lungs: wheezes b/l   Back: No CVA tenderness  Abd: Soft, non-tender, non-distended.    Abd US:    Ext: non-tender b/l, no edema

## 2019-03-20 NOTE — H&P ADULT - ATTENDING COMMENTS
33 yr G 2 with chronic hypertension and asthma   Patient was seen in the ER on Monday with an attack and came back today with another exacerbation  She is admitted to further manage her asthma with pulmonary   Positive FH  Will also monitor her blood pressure and see if she needs further medication added

## 2019-03-20 NOTE — ED ADULT NURSE NOTE - OBJECTIVE STATEMENT
34 y/o 21 week pregnant female A&Ox3 with hx of asthma presents to ED for worsening SOB. Pt states recent admission ...  As per pt, +non productive cough, non smoker and no recent travel. Upon assessment, pt breathing is labored, b/l wheesing throughout, no cyaonosis noted.  Denies CP, headache, and pain.   caroline 1939  steriod solumedral  wheezes b/l21 weeks pregnant 32 y/o 21 week pregnant female A&Ox3 with hx of asthma presents to ED for worsening SOB. Pt states recent admission on Monday. As per pt, +non productive cough, non smoker and no recent travel. Upon assessment, pt breathing is labored, b/l wheezing throughout, use of accessory muscles, no cyanosis noted. Denies CP, headache, and pain.   caroline johnson 32 y/o 21 week pregnant female A&Ox3 with hx of asthma presents to ED for worsening SOB. Pt states recent admission on Monday. As per pt, +non productive cough, non smoker and no recent travel. Upon assessment, pt breathing is labored, b/l wheezing throughout, use of accessory muscles, no cyanosis noted. Denies CP, headache, weakness, numbness and pain. Abdomen is round, soft, and nontender to touch. Safety measures maintained, cousin at bedside.

## 2019-03-20 NOTE — CHART NOTE - NSCHARTNOTEFT_GEN_A_CORE
PULMONARY CRITICAL CARE FELLOW BRIEF CONSULT NOTE    PRELIM RECS--NOTE TO FOLLOW    -Solumedrol 40 q12H  -Albuterol/Atrovent q6h YEFRI for now  -Albuterol Q2H PRN   -Symbicort (as substitution for Advair)  -Can hold QVAR  -Okay to continue montelukast (patient has discussed with Dr. Kelley during pregnancy)  -Follow up RVP  -Please call pulmonary fellow with any questions  -Low threshold to call ICU for any decompensation, I have let the overnight MICU team know of patient's admission  -Discussed with OB/GYN resident PULMONARY CRITICAL CARE FELLOW BRIEF CONSULT NOTE    HPI:  32 yo  at 21w0d with severe persistent asthma, allergic variant with elevated IgE, allergic rhinitis, GERD, presents to the ED due to SOB, wheezing.  Pt was seen in the ED on 3/18 due to similar complaints, where she was given duonebs and discharged the 3/19 on prednisone but was unable to fill the Rx.  She had worsening symptoms today which prompted her to return to the ED. She noted she was having worsening wheezing requiring increased nebulizer use. Pt has had frequent exacerbations, particularly during her last pregnancy, no history of iCU stays or intubations. Reports an attack 2018 due to changing of the seasons and took a short course of prednisone which relieved symptoms. She denies fevers, chills, cough, sputum production, LE swelling or other complaints. States she feels significantly better after receiving nebs/steroids/Mg but still needs O2 via NC.     PMH/PSH: as above  SH: Denies smoking use, drug use, alcohol use.   Home Meds: Home meds: procardia 90xl, ASA 81, metformin 500 mg qD, spiriva BID, qvar 80 BID, advair 500 BID, ventolin PRN, singulair 10mg qHS, protonix 40mg, zantac 300mg   ALL:  NKDA    VS:  Afebrile, tachy 115, /94, rr 16, spo2 95% 3l    PE:   Gen: nad, speaking in full sentences  HEENT: nc/at  CV: tachycardic, no murmurs  Pulm: wheezing b/l, no accessory muscle use, moving air  GI: soft, ntnd  Ext: no c/c/e    Labs: reviewed, leukocytosis, bicarb 19, VBG without significant abnormality, UA noted    A/P  32 yo  at 21w0d with severe persistent asthma, allergic variant with elevated IgE, allergic rhinitis, GERD, presents to the ED due to SOB, wheezing 2/2 asthma exacerbation    #Asthma exacerbation  -Solumedrol 40 q12H  -Albuterol/Atrovent q6h YEFRI for now  -Albuterol Q2H PRN   -Symbicort (as substitution for Advair)  -Can hold QVAR (with systemic steroids and while on ICS/LABA combination therapy; can discuss with Dr. Kelley)  -Hold Spiriva while on Atrovent  -Okay to continue montelukast (patient has discussed with Dr. Kelley during pregnancy)  -Follow up RVP--negative  -Please call pulmonary fellow with any questions  -Low threshold to call ICU for any decompensation, I have let the overnight MICU team know of patient's admission  -Discussed with OB/GYN resident  -Dr. Kelley made aware of admission    Jered Antony MD, PGY4  Pulmonary and Critical Care Fellow  [id number] PULMONARY CRITICAL CARE FELLOW BRIEF CONSULT NOTE    HPI:  32 yo  at 21w0d with severe persistent asthma, allergic variant with elevated IgE, allergic rhinitis, GERD, presents to the ED due to SOB, wheezing.  Pt was seen in the ED on 3/18 due to similar complaints, where she was given duonebs and discharged the 3/19 on prednisone but was unable to fill the Rx.  She had worsening symptoms today which prompted her to return to the ED. She noted she was having worsening wheezing requiring increased nebulizer use. Pt has had frequent exacerbations, particularly during her last pregnancy, no history of iCU stays or intubations. Reports an attack 2018 due to changing of the seasons and took a short course of prednisone which relieved symptoms. She denies fevers, chills, cough, sputum production, LE swelling or other complaints. States she feels significantly better after receiving nebs/steroids/Mg but still needs O2 via NC.     PMH/PSH: as above  SH: Denies smoking use, drug use, alcohol use.   Home Meds: Home meds: procardia 90xl, ASA 81, metformin 500 mg qD, spiriva BID, qvar 80 BID, advair 500 BID, ventolin PRN, singulair 10mg qHS, protonix 40mg, zantac 300mg   ALL:  NKDA    VS:  Afebrile, tachy 115, /94, rr 16, spo2 95% 3l    PE:   Gen: nad, speaking in full sentences  HEENT: nc/at  CV: tachycardic, no murmurs  Pulm: wheezing b/l, no accessory muscle use, moving air  GI: soft, ntnd  Ext: no c/c/e    Labs: reviewed, leukocytosis, bicarb 19, VBG without significant abnormality, UA noted    A/P  32 yo  at 21w0d with severe persistent asthma, allergic variant with elevated IgE, allergic rhinitis, GERD, presents to the ED due to SOB, wheezing 2/2 asthma exacerbation    #Asthma exacerbation  -Solumedrol 40 q12H  -Albuterol/Atrovent q6h YEFRI for now  -Albuterol Q2H PRN   -Symbicort (as substitution for Advair)  -Can hold QVAR (with systemic steroids and while on ICS/LABA combination therapy; can discuss with Dr. Kelley)  -Hold Spiriva while on Atrovent  -Okay to continue montelukast (patient has discussed with Dr. Kelley during pregnancy)  -Follow up RVP--negative  -Please call pulmonary fellow with any questions  -Low threshold to call ICU for any decompensation, I have let the overnight MICU team know of patient's admission  -Discussed with OB/GYN resident  -Dr. Kelley made aware of admission    Jreed Antony MD, PGY4  Pulmonary and Critical Care Fellow  [phone]      MICU attending addendum:  Patient seen and examined. Still wheezing, tight. Saturating 96-97% on 3L  -- would give additional neb now  -- would change steroids to solumedrol 20 IV q8  -- maintain sats >96%  Dr Kelley to follow in am

## 2019-03-20 NOTE — ED ADULT TRIAGE NOTE - CHIEF COMPLAINT QUOTE
SOB x 4 days, +accessory muscle use, pt states that she was here x 2 days ago for similar symptoms with no relief, started on steroids. Hx Asthma, 21 weeks pregnant

## 2019-03-20 NOTE — ED PROVIDER NOTE - PHYSICAL EXAMINATION
****ATTENDING**** Patient is awake,alert,oriented x 3. Patient is in moderate respiratory distress. Patient's chest w diffuse wheezing +s1s2. Abdomen is soft +gravid/nt +BS. Extremity with no swelling or calf tenderness. No JVD.

## 2019-03-20 NOTE — ED ADULT NURSE REASSESSMENT NOTE - NS ED NURSE REASSESS COMMENT FT1
Report received from  Rupa/STANLEY Parikh  Pt resting comfortably with family at bedside.   RVP resulted (-), report to be given to L&D  Safety maintained at all times, bed in lowest position, call bell in hand.  Will continue to monitor closely.  Pt reports improvement in breathing

## 2019-03-20 NOTE — ED PROVIDER NOTE - OBJECTIVE STATEMENT
34yo f  21weeks pregnant, Asthma, DM, hx Preclampsia, HTN on procardia, Metformin pw sob since Monday. States she has history of allergies stimulating her asthma. Seen at [facility] given prednisone that day feeling better but was unable to get steroids outpatient and today w increasing SOB and wheezing. + Cough today non productive, no rhinnorhea. No fever or chills. No sick contact. Patient states she has hx of allergies being stimulated by weather. No recent travel car rides. BP and BS has been under control.

## 2019-03-20 NOTE — ED PROVIDER NOTE - CRITICAL CARE PROVIDED
consult w/ pt's family directly relating to pts condition/additional history taking/direct patient care (not related to procedure)/documentation/interpretation of diagnostic studies/consultation with other physicians

## 2019-03-20 NOTE — H&P ADULT - NSHPLABSRESULTS_GEN_ALL_CORE
LABS:                            13.8   12.0  )-----------( 314      ( 20 Mar 2019 19:55 )             40.3     03-    138  |  104  |  8   ----------------------------<  92  3.8   |  19<L>  |  0.56    Ca    9.7      20 Mar 2019 19:55    TPro  7.1  /  Alb  3.9  /  TBili  0.2  /  DBili  x   /  AST  14  /  ALT  12  /  AlkPhos  78  03-20    I&O's Detail    PT/INR - ( 19 Mar 2019 01:21 )   PT: 11.3 sec;   INR: 0.98 ratio         PTT - ( 19 Mar 2019 01:21 )  PTT:27.0 sec  Urinalysis Basic - ( 19 Mar 2019 02:18 )    Color: Yellow / Appearance: Slightly Turbid / S.011 / pH: x  Gluc: x / Ketone: Negative  / Bili: Negative / Urobili: Negative   Blood: x / Protein: Trace / Nitrite: Negative   Leuk Esterase: Large / RBC: 3 /hpf /  /HPF   Sq Epi: x / Non Sq Epi: 14 /hpf / Bacteria: Many

## 2019-03-20 NOTE — ED PROVIDER NOTE - CLINICAL SUMMARY MEDICAL DECISION MAKING FREE TEXT BOX
Patient 21 weeks pregnant w hx asthma. Pt hypoxic in moderate distress w diffuse wheezing. Sat improving w duoneb.   OB consult. Closely monitor patient.

## 2019-03-21 ENCOUNTER — APPOINTMENT (OUTPATIENT)
Dept: ANTEPARTUM | Facility: CLINIC | Age: 34
End: 2019-03-21

## 2019-03-21 ENCOUNTER — APPOINTMENT (OUTPATIENT)
Dept: MATERNAL FETAL MEDICINE | Facility: CLINIC | Age: 34
End: 2019-03-21

## 2019-03-21 ENCOUNTER — APPOINTMENT (OUTPATIENT)
Age: 34
End: 2019-03-21

## 2019-03-21 ENCOUNTER — ASOB RESULT (OUTPATIENT)
Age: 34
End: 2019-03-21

## 2019-03-21 ENCOUNTER — APPOINTMENT (OUTPATIENT)
Dept: OBGYN | Facility: CLINIC | Age: 34
End: 2019-03-21

## 2019-03-21 LAB
ALBUMIN SERPL ELPH-MCNC: 3.7 G/DL — SIGNIFICANT CHANGE UP (ref 3.3–5)
ALP SERPL-CCNC: 74 U/L — SIGNIFICANT CHANGE UP (ref 40–120)
ALT FLD-CCNC: 11 U/L — SIGNIFICANT CHANGE UP (ref 10–45)
ANION GAP SERPL CALC-SCNC: 13 MMOL/L — SIGNIFICANT CHANGE UP (ref 5–17)
APTT BLD: 25.7 SEC — LOW (ref 27.5–36.3)
AST SERPL-CCNC: 11 U/L — SIGNIFICANT CHANGE UP (ref 10–40)
BASOPHILS # BLD AUTO: 0 K/UL — SIGNIFICANT CHANGE UP (ref 0–0.2)
BASOPHILS NFR BLD AUTO: 0.3 % — SIGNIFICANT CHANGE UP (ref 0–2)
BILIRUB SERPL-MCNC: 0.2 MG/DL — SIGNIFICANT CHANGE UP (ref 0.2–1.2)
BUN SERPL-MCNC: 8 MG/DL — SIGNIFICANT CHANGE UP (ref 7–23)
CALCIUM SERPL-MCNC: 9.2 MG/DL — SIGNIFICANT CHANGE UP (ref 8.4–10.5)
CHLORIDE SERPL-SCNC: 104 MMOL/L — SIGNIFICANT CHANGE UP (ref 96–108)
CO2 SERPL-SCNC: 19 MMOL/L — LOW (ref 22–31)
CREAT SERPL-MCNC: 0.47 MG/DL — LOW (ref 0.5–1.3)
EOSINOPHIL # BLD AUTO: 0 K/UL — SIGNIFICANT CHANGE UP (ref 0–0.5)
EOSINOPHIL NFR BLD AUTO: 0.4 % — SIGNIFICANT CHANGE UP (ref 0–6)
FIBRINOGEN PPP-MCNC: 662 MG/DL — HIGH (ref 350–510)
GLUCOSE BLDC GLUCOMTR-MCNC: 108 MG/DL — HIGH (ref 70–99)
GLUCOSE BLDC GLUCOMTR-MCNC: 115 MG/DL — HIGH (ref 70–99)
GLUCOSE BLDC GLUCOMTR-MCNC: 129 MG/DL — HIGH (ref 70–99)
GLUCOSE BLDC GLUCOMTR-MCNC: 135 MG/DL — HIGH (ref 70–99)
GLUCOSE BLDC GLUCOMTR-MCNC: 141 MG/DL — HIGH (ref 70–99)
GLUCOSE BLDC GLUCOMTR-MCNC: 165 MG/DL — HIGH (ref 70–99)
GLUCOSE BLDC GLUCOMTR-MCNC: 168 MG/DL — HIGH (ref 70–99)
GLUCOSE BLDC GLUCOMTR-MCNC: 89 MG/DL — SIGNIFICANT CHANGE UP (ref 70–99)
GLUCOSE SERPL-MCNC: 167 MG/DL — HIGH (ref 70–99)
HCT VFR BLD CALC: 37.8 % — SIGNIFICANT CHANGE UP (ref 34.5–45)
HGB BLD-MCNC: 12.7 G/DL — SIGNIFICANT CHANGE UP (ref 11.5–15.5)
INR BLD: 1 RATIO — SIGNIFICANT CHANGE UP (ref 0.88–1.16)
LDH SERPL L TO P-CCNC: 112 U/L — SIGNIFICANT CHANGE UP (ref 50–242)
LYMPHOCYTES # BLD AUTO: 0.8 K/UL — LOW (ref 1–3.3)
LYMPHOCYTES # BLD AUTO: 7.7 % — LOW (ref 13–44)
MCHC RBC-ENTMCNC: 29.9 PG — SIGNIFICANT CHANGE UP (ref 27–34)
MCHC RBC-ENTMCNC: 33.6 GM/DL — SIGNIFICANT CHANGE UP (ref 32–36)
MCV RBC AUTO: 88.8 FL — SIGNIFICANT CHANGE UP (ref 80–100)
MONOCYTES # BLD AUTO: 0.1 K/UL — SIGNIFICANT CHANGE UP (ref 0–0.9)
MONOCYTES NFR BLD AUTO: 1.3 % — LOW (ref 2–14)
NEUTROPHILS # BLD AUTO: 9.2 K/UL — HIGH (ref 1.8–7.4)
NEUTROPHILS NFR BLD AUTO: 90.3 % — HIGH (ref 43–77)
PLATELET # BLD AUTO: 313 K/UL — SIGNIFICANT CHANGE UP (ref 150–400)
POTASSIUM SERPL-MCNC: 4.4 MMOL/L — SIGNIFICANT CHANGE UP (ref 3.5–5.3)
POTASSIUM SERPL-SCNC: 4.4 MMOL/L — SIGNIFICANT CHANGE UP (ref 3.5–5.3)
PROT SERPL-MCNC: 6.7 G/DL — SIGNIFICANT CHANGE UP (ref 6–8.3)
PROTHROM AB SERPL-ACNC: 11.5 SEC — SIGNIFICANT CHANGE UP (ref 10–12.9)
RBC # BLD: 4.26 M/UL — SIGNIFICANT CHANGE UP (ref 3.8–5.2)
RBC # FLD: 14 % — SIGNIFICANT CHANGE UP (ref 10.3–14.5)
SODIUM SERPL-SCNC: 136 MMOL/L — SIGNIFICANT CHANGE UP (ref 135–145)
T PALLIDUM AB TITR SER: NEGATIVE — SIGNIFICANT CHANGE UP
URATE SERPL-MCNC: 3.4 MG/DL — SIGNIFICANT CHANGE UP (ref 2.5–7)
WBC # BLD: 10.2 K/UL — SIGNIFICANT CHANGE UP (ref 3.8–10.5)
WBC # FLD AUTO: 10.2 K/UL — SIGNIFICANT CHANGE UP (ref 3.8–10.5)

## 2019-03-21 PROCEDURE — 99221 1ST HOSP IP/OBS SF/LOW 40: CPT | Mod: 24

## 2019-03-21 PROCEDURE — 59400 OBSTETRICAL CARE: CPT

## 2019-03-21 PROCEDURE — 99233 SBSQ HOSP IP/OBS HIGH 50: CPT

## 2019-03-21 RX ORDER — PROGESTERONE 200 MG/1
100 CAPSULE, LIQUID FILLED ORAL AT BEDTIME
Qty: 0 | Refills: 0 | Status: DISCONTINUED | OUTPATIENT
Start: 2019-03-21 | End: 2019-03-22

## 2019-03-21 RX ORDER — ASPIRIN/CALCIUM CARB/MAGNESIUM 324 MG
81 TABLET ORAL DAILY
Qty: 0 | Refills: 0 | Status: DISCONTINUED | OUTPATIENT
Start: 2019-03-21 | End: 2019-03-22

## 2019-03-21 RX ADMIN — Medication 1 TABLET(S): at 12:30

## 2019-03-21 RX ADMIN — MONTELUKAST 10 MILLIGRAM(S): 4 TABLET, CHEWABLE ORAL at 22:08

## 2019-03-21 RX ADMIN — Medication 90 MILLIGRAM(S): at 09:45

## 2019-03-21 RX ADMIN — Medication 81 MILLIGRAM(S): at 12:30

## 2019-03-21 RX ADMIN — BUDESONIDE AND FORMOTEROL FUMARATE DIHYDRATE 2 PUFF(S): 160; 4.5 AEROSOL RESPIRATORY (INHALATION) at 18:44

## 2019-03-21 RX ADMIN — Medication 20 MILLIGRAM(S): at 03:54

## 2019-03-21 RX ADMIN — PROGESTERONE 100 MILLIGRAM(S): 200 CAPSULE, LIQUID FILLED ORAL at 22:08

## 2019-03-21 RX ADMIN — Medication 20 MILLIGRAM(S): at 22:08

## 2019-03-21 RX ADMIN — Medication 3 MILLILITER(S): at 11:36

## 2019-03-21 RX ADMIN — BUDESONIDE AND FORMOTEROL FUMARATE DIHYDRATE 2 PUFF(S): 160; 4.5 AEROSOL RESPIRATORY (INHALATION) at 08:01

## 2019-03-21 RX ADMIN — Medication 3 MILLILITER(S): at 01:57

## 2019-03-21 RX ADMIN — MONTELUKAST 10 MILLIGRAM(S): 4 TABLET, CHEWABLE ORAL at 02:31

## 2019-03-21 RX ADMIN — Medication 3 MILLILITER(S): at 18:09

## 2019-03-21 RX ADMIN — Medication 1 MILLIGRAM(S): at 12:30

## 2019-03-21 RX ADMIN — Medication 20 MILLIGRAM(S): at 15:40

## 2019-03-21 NOTE — DIETITIAN INITIAL EVALUATION ADULT. - NS AS NUTRI INTERV ED CONTENT
Purpose of the nutrition education/Educated pt on consistent CHO diet order, reviewed menu ordering procedure in-house, CHO allowance per meals and snacks, appropriate CHO portion sizes, CHO counting, nutrition-label reading, balanced meal intake/macronutrient pairing, general goal FS range review. Pt voiced understanding and accepted Carbohydrate Counting handout for reference as needed./Nutrition relationship to health/disease/Recommended modifications

## 2019-03-21 NOTE — DIETITIAN INITIAL EVALUATION ADULT. - ADHERENCE
fair/Pt reports adhering fairly well to a consistent CHO GDM diet. Attends outpatient RD for assistance in GDM management. Admits to  occasional "cheating" which she acknowledges had resulted in elevated FS in the past.

## 2019-03-21 NOTE — DIETITIAN INITIAL EVALUATION ADULT. - NS AS NUTRI INTERV MEALS SNACK
Carbohydrate - modified diet/Continue consistent CHO GDM diet order to promote glucose control./General/healthful diet

## 2019-03-21 NOTE — DIETITIAN INITIAL EVALUATION ADULT. - ENERGY NEEDS
ht: 62 inches. wt: 157 pounds. pre-pregnancy wt: 162 pounds. pre-pregnancy BMI: 29.6 kG/m2. IBW: 110 pounds +/- 10%. %IBW: 147%  Other pertinent objective information: Pt is a 33 year old  at 21 weeks EGA presents to the ED due to SOB, wheezing in the setting of an asthma exacerbation . Pt has a PMH of severe persistent asthma, GERD, cHTN and T2DM. Continues on Solumedrol. On Humalog SS at this time for glycemic control. Skin WDL.

## 2019-03-21 NOTE — PROGRESS NOTE ADULT - ATTENDING COMMENTS
NOTE IS INCOMPLETE    Molina Rogers MD  [phone]    - Continue current regimen - would leave patient on IV steroids for today and allow her to ambulate if ok with OB. Would give incentive spirometer as well. Coverage for Dr. Warren Rogers MD  [phone]

## 2019-03-21 NOTE — CHART NOTE - NSCHARTNOTEFT_GEN_A_CORE
R3 Antepartum Note - HD#2    Patient seen and examined at bedside. Patient has  improved chest tightness and wheezing.  She denies chest pain, lightheadedness, headache, and blurry vision.  She also denies any ctx, LOF, VB. +FM.     Physical Exam:    Vital Signs Last 24 Hrs  T(C): 36.9 (21 Mar 2019 05:00), Max: 36.9 (20 Mar 2019 21:47)  T(F): 98.4 (21 Mar 2019 05:00), Max: 98.4 (20 Mar 2019 21:47)  HR: 84 (21 Mar 2019 05:00) (84 - 128)  BP: 119/78 (21 Mar 2019 05:00) (119/78 - 173/112)  BP(mean): 103 (21 Mar 2019 00:35) (103 - 121)  RR: 16 (21 Mar 2019 05:00) (16 - 34)  SpO2: 96% (21 Mar 2019 05:00) (87% - 97%) on 1L NC      General: NAD  CV RRR S1S2  Pulm b/l diffuse wheezes  Abdomen: Soft, non-tender, gravid.  LE nontender b/l    32 yo  at 21w1d admitted with asthma exacerbation.  Patient has cHTN, BPs were elevated at time of exacerbation but have improved and patient is without symptoms of preeclampsia.      1. Asthma exacerbation  -Continue to monitor VS and symptoms  -Wean supplemental oxygen as needed  -Appreciate pulmonology recommendations.  Continue Solumedrol 20 q8H, Albuterol/atrovent q6hr, Symbicort, Montelukast, Duonebs    2.  Chronic hypertension   -Continue to monitor BPs and symptoms of preeclampsia  -Continue home Procardia XL 90 mg daily  -f/u AM HELLP labs  -24h urine protein collection    3.  Likely pregestational diabetes  -FS monitoring with insulin sliding scale coverage as needed    4.  Fetal well-being  - checks daily  -Comprehensive scan scheduled for today, will complete as inpatient    5.  Maternal well-being  -reg diet  -SCDs, ambulation for DVT ppisabel Goodman PGY3

## 2019-03-21 NOTE — DIETITIAN INITIAL EVALUATION ADULT. - OTHER INFO
Pt seen for nutrition-risk screen T2DM in pregnancy. Pt reports good po intake/appetite at this time, denies GI distress no N+V, diarrhea or constipation. Denies chewing/swallowing issues. Pt reports weight loss of ~5 pounds from pre-pregnancy wt 162 pounds 2/2 change in eating habits and N+V during first trimester of pregnancy. Pt reports hx of T2DM diagnosed in April 2018 where her A1c was 6.7%; was placed on Metformin and was able to lower A1c to 6.1%. Pt recently attended outpatient RD, pt familiar with CHO counting/nutrition-label reading, recommended CHO per meals/snacks, goal FS ranges. Pt with several additional questions and is amenable to review recommendations for diabetes in pregnancy management and diet recommendations.

## 2019-03-21 NOTE — PROGRESS NOTE ADULT - SUBJECTIVE AND OBJECTIVE BOX
ATTENDING NOTE -   JAMAAL SHEPHERD  MRN-[id number]  33y      Patient is a 33y old  Female who presents with a chief complaint of asthma, HTN (21 Mar 2019 08:07)      Subjective:  The patient feels better. Not experiencing wheezing, no SOB    +FM, no VB, no LOF, no contractions.     Physical exam:    Vital Signs Last 24 Hrs  T(C): 36.9 (21 Mar 2019 21:50), Max: 36.9 (21 Mar 2019 01:00)  T(F): 98.4 (21 Mar 2019 21:50), Max: 98.4 (21 Mar 2019 01:00)  HR: 88 (21 Mar 2019 21:50) (70 - 110)  BP: 128/85 (21 Mar 2019 21:50) (119/78 - 142/87)  BP(mean): 103 (21 Mar 2019 00:35) (103 - 121)  RR: 18 (21 Mar 2019 21:50) (16 - 20)  SpO2: 97% (21 Mar 2019 21:50) (94% - 98%)    Gen: NAD    OB U/S: +FH, +cervical shortening. 2.3cm     LABS:                        12.7   10.2  )-----------( 313      ( 21 Mar 2019 06:27 )             37.8                         13.8   12.0  )-----------( 314      ( 20 Mar 2019 19:55 )             40.3     ABO Interpretation: A (03-20 @ 22:38)  Rh Interpretation: Positive (03-20 @ 22:38)  Antibody Screen: Negative (03-20 @ 22:38)    03-21-19 @ 06:02      136  |  104  |  8   ----------------------------<  167<H>  4.4   |  19<L>  |  0.47<L>    03-20-19 @ 19:55      138  |  104  |  8   ----------------------------<  92  3.8   |  19<L>  |  0.56    03-19-19 @ 01:21      137  |  102  |  7   ----------------------------<  129<H>  3.6   |  21<L>  |  0.58        Ca    9.2      21 Mar 2019 06:02  Ca    9.7      20 Mar 2019 19:55  Ca    9.6      19 Mar 2019 01:21    TPro  6.7  /  Alb  3.7  /  TBili  0.2  /  DBili  x   /  AST  11  /  ALT  11  /  AlkPhos  74  03-21-19 @ 06:02  TPro  7.1  /  Alb  3.9  /  TBili  0.2  /  DBili  x   /  AST  14  /  ALT  12  /  AlkPhos  78  03-20-19 @ 19:55  TPro  7.3  /  Alb  3.8  /  TBili  0.2  /  DBili  x   /  AST  14  /  ALT  14  /  AlkPhos  77  03-19-19 @ 01:21        Allergies    No Known Allergies    Intolerances      MEDICATIONS  (STANDING):  ALBUTerol    90 MICROgram(s) HFA Inhaler 1 Puff(s) Inhalation every 4 hours  ALBUTerol/ipratropium for Nebulization 3 milliLiter(s) Nebulizer every 6 hours  aspirin  chewable 81 milliGRAM(s) Oral daily  buDESOnide 160 MICROgram(s)/formoterol 4.5 MICROgram(s) Inhaler 2 Puff(s) Inhalation two times a day  dextrose 5%. 1000 milliLiter(s) (50 mL/Hr) IV Continuous <Continuous>  dextrose 50% Injectable 12.5 Gram(s) IV Push once  dextrose 50% Injectable 25 Gram(s) IV Push once  dextrose 50% Injectable 25 Gram(s) IV Push once  folic acid 1 milliGRAM(s) Oral daily  insulin lispro (HumaLOG) corrective regimen sliding scale   SubCutaneous three times a day before meals  methylPREDNISolone sodium succinate Injectable 20 milliGRAM(s) IV Push every 8 hours  montelukast 10 milliGRAM(s) Oral daily  NIFEdipine XL 90 milliGRAM(s) Oral daily  prenatal multivitamin 1 Tablet(s) Oral daily  progesterone Vaginal Insert 100 milliGRAM(s) Vaginal at bedtime  tiotropium 18 MICROgram(s) Capsule 1 Capsule(s) Inhalation daily    MEDICATIONS  (PRN):  ALBUTerol    90 MICROgram(s) HFA Inhaler 1 Puff(s) Inhalation every 2 hours PRN Shortness of Breath and/or Wheezing  dextrose 40% Gel 15 Gram(s) Oral once PRN Blood Glucose LESS THAN 70 milliGRAM(s)/deciliter  glucagon  Injectable 1 milliGRAM(s) IntraMuscular once PRN Glucose LESS THAN 70 milligrams/deciliter        Assessment and Plan  33y old  Female who presents with  asthma, HTN at 21 wks EGA   Patient followed by Pulmonary and is on steroids  Cervical shortening on OB U/S, patient started on vaginal progesterone.   OB U/S to follow up cervix in one week  patient stable   continue to monitor.     Dianne Patterson MD  Office Number [phone] Side rails/Explanation of exam/test/Bedside visitors/Position of comfort/Call bell

## 2019-03-21 NOTE — DIETITIAN INITIAL EVALUATION ADULT. - ORAL INTAKE PTA
Pt reports good po intake/appetite PTA. Typical meal intake: Slice of toast with peanut butter or cream cheese. Lunch: Wrap or sandwich. Dinner: Rice with meat and vegetables. Pt avoids sweetened beverages/juices. Takes pre-parmjit vitamin PTA. Denies food allergies or intolerance./good

## 2019-03-21 NOTE — PROGRESS NOTE ADULT - SUBJECTIVE AND OBJECTIVE BOX
[facility] DIVISION OF PULMONARY, CRITICAL CARE and SLEEP MEDICINE  PULMONARY PROGRESS NOTE    COVERAGE FOR DR. GOSS    PATIENT INFORMATION:  NAME: JAMAAL SHEPHERD:  MRN: [id number]    CHIEF COMPLAINT: Patient is a 33y old  Female who presents with a chief complaint of asthma, HTN (20 Mar 2019 20:30)      [x] INITIAL CONSULT, H&P, FAMILY HISTORY and PAST MEDICAL AND SURGICAL HISTORY REVIEWED    OVERNIGHT EVENTS or CHANGES TO HPI: Patient feeling better. She is currently off supplemental O2 and saturating well  She is laying nearly flat in bed and breathing comfortably.  Her symptoms started about 3 days ago - she was in ED 3/19 and received steroids/nebs with improvement and was discharged that day with Pred 20mg.  Her symptoms continued and she returned to ED - last night per overnight pulmonary/micu team she was in moderate respiratory distress requiring supplemental O2 and IV steroids.  She denies smoking or sick contacts. She may have some exposure to dust at work.   She has a history of severe persistent asthma which has been difficult to control - and did get worse during her last pregnancy.    At home she is on Advair, Spiriva, Qvar, and Singulair for her asthma    ========================REVIEW OF SYSTEMS========================  CONSTITUTIONAL: Feeling better   CARDIOVASCULAR: Denies chest pain   PULMONARY: Intermittent cough, no hemoptysis, no sputum  [x] REMAINING REVIEW OF SYSTEMS NEGATIVE  [] UNABLE TO OBTAIN REVIEW OF SYSTEMS DUE TO _______________    ========================MEDICATIONS=============================  MEDICATIONS  (STANDING):  ALBUTerol    90 MICROgram(s) HFA Inhaler 1 Puff(s) Inhalation every 4 hours  ALBUTerol/ipratropium for Nebulization 3 milliLiter(s) Nebulizer every 6 hours  aspirin  chewable 81 milliGRAM(s) Oral daily  buDESOnide 160 MICROgram(s)/formoterol 4.5 MICROgram(s) Inhaler 2 Puff(s) Inhalation two times a day  dextrose 5%. 1000 milliLiter(s) (50 mL/Hr) IV Continuous <Continuous>  dextrose 50% Injectable 12.5 Gram(s) IV Push once  dextrose 50% Injectable 25 Gram(s) IV Push once  dextrose 50% Injectable 25 Gram(s) IV Push once  folic acid 1 milliGRAM(s) Oral daily  insulin lispro (HumaLOG) corrective regimen sliding scale   SubCutaneous three times a day before meals  methylPREDNISolone sodium succinate Injectable 20 milliGRAM(s) IV Push every 8 hours  montelukast 10 milliGRAM(s) Oral daily  NIFEdipine XL 90 milliGRAM(s) Oral daily  prenatal multivitamin 1 Tablet(s) Oral daily  tiotropium 18 MICROgram(s) Capsule 1 Capsule(s) Inhalation daily      MEDICATIONS  (PRN):  ALBUTerol    90 MICROgram(s) HFA Inhaler 1 Puff(s) Inhalation every 2 hours PRN Shortness of Breath and/or Wheezing  dextrose 40% Gel 15 Gram(s) Oral once PRN Blood Glucose LESS THAN 70 milliGRAM(s)/deciliter  glucagon  Injectable 1 milliGRAM(s) IntraMuscular once PRN Glucose LESS THAN 70 milligrams/deciliter      ========================PHYSICAL EXAM============================    VITALS: ICU Vital Signs Last 24 Hrs  T(C): 36.9 (21 Mar 2019 05:00), Max: 36.9 (20 Mar 2019 21:47)  T(F): 98.4 (21 Mar 2019 05:00), Max: 98.4 (20 Mar 2019 21:47)  HR: 84 (21 Mar 2019 05:00) (84 - 128)  BP: 119/78 (21 Mar 2019 05:00) (119/78 - 173/112)  BP(mean): 103 (21 Mar 2019 00:35) (103 - 121)  RR: 16 (21 Mar 2019 05:00) (16 - 34)  SpO2: 96% (21 Mar 2019 05:00) (87% - 97%)      INTAKE and OUTPUT: I&O's Summary      VENTILATOR SETTINGS: N/a    GENERAL: AAOx3, NAD, comfortable   EYES: anicteric, EOMI  EAR/NOSE/MOUTH/THROAT: NCAT, MMM, nares clear, trachea midline   NECK: supple, no JVD   LYMPH NODES: no palpable supraclavicular LAD   CARDIOVASCULAR: RRR, S1S2   RESPIRATORY: improved air entry, no accessory muscle use, bilateral end expiratory wheezing  ABDOMEN: soft, gravid, NT  EXTREMITIES: no clubbing or cyanosis  SKIN: warm and dry   MUSCULOSKELETAL: strength intact   NEUROLOGIC: nonfocal exam, moves all extremities   PSYCHIATRIC: calm    ========================LABORATORY RESULTS AND IMAGING=============                        12.7   10.2  )-----------( 313      ( 21 Mar 2019 06:27 )             37.8                                                    03-21    136  |  104  |  8   ----------------------------<  167<H>  4.4   |  19<L>  |  0.47<L>    Ca    9.2      21 Mar 2019 06:02    TPro  6.7  /  Alb  3.7  /  TBili  0.2  /  DBili  x   /  AST  11  /  ALT  11  /  AlkPhos  74  03-21    Creatinine Trend: 0.47<--, 0.56<--, 0.58<--    CT CHEST:      [] RADIOLOGY REVIEWED AND INTERPRETED BY ME      THANK YOU FOR ALLOWING US TO PARTICIPATE IN THE CARE OF THIS PATIENT

## 2019-03-22 ENCOUNTER — TRANSCRIPTION ENCOUNTER (OUTPATIENT)
Age: 34
End: 2019-03-22

## 2019-03-22 ENCOUNTER — APPOINTMENT (OUTPATIENT)
Dept: PULMONOLOGY | Facility: CLINIC | Age: 34
End: 2019-03-22

## 2019-03-22 ENCOUNTER — RX RENEWAL (OUTPATIENT)
Age: 34
End: 2019-03-22

## 2019-03-22 VITALS
SYSTOLIC BLOOD PRESSURE: 131 MMHG | RESPIRATION RATE: 18 BRPM | HEART RATE: 83 BPM | OXYGEN SATURATION: 95 % | TEMPERATURE: 98 F | DIASTOLIC BLOOD PRESSURE: 89 MMHG

## 2019-03-22 LAB
GLUCOSE BLDC GLUCOMTR-MCNC: 120 MG/DL — HIGH (ref 70–99)
GLUCOSE BLDC GLUCOMTR-MCNC: 175 MG/DL — HIGH (ref 70–99)

## 2019-03-22 PROCEDURE — 82330 ASSAY OF CALCIUM: CPT

## 2019-03-22 PROCEDURE — 94640 AIRWAY INHALATION TREATMENT: CPT

## 2019-03-22 PROCEDURE — 83615 LACTATE (LD) (LDH) ENZYME: CPT

## 2019-03-22 PROCEDURE — 93005 ELECTROCARDIOGRAM TRACING: CPT

## 2019-03-22 PROCEDURE — 84156 ASSAY OF PROTEIN URINE: CPT

## 2019-03-22 PROCEDURE — 84295 ASSAY OF SERUM SODIUM: CPT

## 2019-03-22 PROCEDURE — 87581 M.PNEUMON DNA AMP PROBE: CPT

## 2019-03-22 PROCEDURE — 80053 COMPREHEN METABOLIC PANEL: CPT

## 2019-03-22 PROCEDURE — 83880 ASSAY OF NATRIURETIC PEPTIDE: CPT

## 2019-03-22 PROCEDURE — 84550 ASSAY OF BLOOD/URIC ACID: CPT

## 2019-03-22 PROCEDURE — 96374 THER/PROPH/DIAG INJ IV PUSH: CPT

## 2019-03-22 PROCEDURE — 87486 CHLMYD PNEUM DNA AMP PROBE: CPT

## 2019-03-22 PROCEDURE — 85027 COMPLETE CBC AUTOMATED: CPT

## 2019-03-22 PROCEDURE — 84484 ASSAY OF TROPONIN QUANT: CPT

## 2019-03-22 PROCEDURE — 96375 TX/PRO/DX INJ NEW DRUG ADDON: CPT

## 2019-03-22 PROCEDURE — 82947 ASSAY GLUCOSE BLOOD QUANT: CPT

## 2019-03-22 PROCEDURE — 82435 ASSAY OF BLOOD CHLORIDE: CPT

## 2019-03-22 PROCEDURE — 99232 SBSQ HOSP IP/OBS MODERATE 35: CPT

## 2019-03-22 PROCEDURE — 87798 DETECT AGENT NOS DNA AMP: CPT

## 2019-03-22 PROCEDURE — 99291 CRITICAL CARE FIRST HOUR: CPT | Mod: 25

## 2019-03-22 PROCEDURE — 86901 BLOOD TYPING SEROLOGIC RH(D): CPT

## 2019-03-22 PROCEDURE — 86900 BLOOD TYPING SEROLOGIC ABO: CPT

## 2019-03-22 PROCEDURE — 85730 THROMBOPLASTIN TIME PARTIAL: CPT

## 2019-03-22 PROCEDURE — 85384 FIBRINOGEN ACTIVITY: CPT

## 2019-03-22 PROCEDURE — 85610 PROTHROMBIN TIME: CPT

## 2019-03-22 PROCEDURE — 85014 HEMATOCRIT: CPT

## 2019-03-22 PROCEDURE — 83605 ASSAY OF LACTIC ACID: CPT

## 2019-03-22 PROCEDURE — 86850 RBC ANTIBODY SCREEN: CPT

## 2019-03-22 PROCEDURE — 81001 URINALYSIS AUTO W/SCOPE: CPT

## 2019-03-22 PROCEDURE — 99238 HOSP IP/OBS DSCHRG MGMT 30/<: CPT

## 2019-03-22 PROCEDURE — 86780 TREPONEMA PALLIDUM: CPT

## 2019-03-22 PROCEDURE — 84132 ASSAY OF SERUM POTASSIUM: CPT

## 2019-03-22 PROCEDURE — 82962 GLUCOSE BLOOD TEST: CPT

## 2019-03-22 PROCEDURE — 82803 BLOOD GASES ANY COMBINATION: CPT

## 2019-03-22 PROCEDURE — 87633 RESP VIRUS 12-25 TARGETS: CPT

## 2019-03-22 RX ORDER — IPRATROPIUM/ALBUTEROL SULFATE 18-103MCG
3 AEROSOL WITH ADAPTER (GRAM) INHALATION
Qty: 0 | Refills: 0 | DISCHARGE
Start: 2019-03-22

## 2019-03-22 RX ORDER — NIFEDIPINE 30 MG
1 TABLET, EXTENDED RELEASE 24 HR ORAL
Qty: 0 | Refills: 0 | DISCHARGE
Start: 2019-03-22

## 2019-03-22 RX ORDER — PROGESTERONE 200 MG/1
1 CAPSULE, LIQUID FILLED ORAL
Qty: 0 | Refills: 0 | DISCHARGE
Start: 2019-03-22

## 2019-03-22 RX ORDER — ASPIRIN/CALCIUM CARB/MAGNESIUM 324 MG
1 TABLET ORAL
Qty: 0 | Refills: 0 | DISCHARGE
Start: 2019-03-22

## 2019-03-22 RX ADMIN — Medication 81 MILLIGRAM(S): at 12:18

## 2019-03-22 RX ADMIN — Medication 3 MILLILITER(S): at 06:29

## 2019-03-22 RX ADMIN — Medication 90 MILLIGRAM(S): at 09:33

## 2019-03-22 RX ADMIN — Medication 3 MILLILITER(S): at 00:03

## 2019-03-22 RX ADMIN — Medication 20 MILLIGRAM(S): at 06:30

## 2019-03-22 RX ADMIN — BUDESONIDE AND FORMOTEROL FUMARATE DIHYDRATE 2 PUFF(S): 160; 4.5 AEROSOL RESPIRATORY (INHALATION) at 06:30

## 2019-03-22 RX ADMIN — Medication 1 MILLIGRAM(S): at 12:18

## 2019-03-22 RX ADMIN — Medication 1 TABLET(S): at 12:18

## 2019-03-22 RX ADMIN — Medication 3 MILLILITER(S): at 12:20

## 2019-03-22 NOTE — PROGRESS NOTE ADULT - ASSESSMENT
33F  with severe persistent asthma with elevated IgE and allergic variant asthma who presents to the ED with severe SOB and wheezing in the setting of an asthma exacerbation - now slightly improved    1. Asthma Exacerbation-improved over all  - Patient with very difficult to control asthma who has yearly exacerbations requiring hospital visit  - Solumedrol 20mg IV Q8 --would change  to PO prednisone 40mg per day as of today  - Continue Symbicort (interchange for Advair)  - Continue Duoneb Q6  - As steroids are tapered down - would restart Pulmicort (interchange for Qvar) - possibly as early as tomorrow  - Would restart Spiriva on discharge - currently receiving Ipratropium in Duoneb  - continue Singulair 10mg q hs    - Would suggest getting patient OOB and ambulating as tolerated  - Maintain O2 sat > 90% - currently saturating well off supplemental oxygen  - Incentive spirometer
33F  with severe persistent asthma with elevated IgE and allergic variant asthma who presents to the ED with severe SOB and wheezing in the setting of an asthma exacerbation - now slightly improved    1. Asthma Exacerbation  - Patient with very difficult to control asthma who has yearly exacerbations requiring hospital visit  - Continue Solumedrol 20mg IV Q8 for today  - Continue Symbicort (interchange for Advair)  - Continue Duoneb Q6  - As steroids are tapered down - would restart Pulmicort (interchange for Qvar) - possibly as early as tomorrow  - Would restart Spiriva on discharge - currently receiving Ipratropium in Duoneb  - Patient has discussed with Dr. Kelley regarding Singulair and will remain on this at this time  - Would suggest getting patient OOB and ambulating as tolerated  - Maintain O2 sat > 90% - currently saturating well off supplemental oxygen  - Incentive spirometer

## 2019-03-22 NOTE — DISCHARGE NOTE ANTEPARTUM - CARE PROVIDER_API CALL
Marcin Duff)  Obstetrics and Gynecology  [address]  [place] [address]  Phone: [phone]  Fax: [phone]  Follow Up Time:     Zac Kelley)  Internal Medicine; Pulmonary Disease  [address], [address]  [place] [address]  Phone: [phone]  Fax: [phone]  Follow Up Time:

## 2019-03-22 NOTE — PROGRESS NOTE ADULT - ATTENDING COMMENTS
as above---  asthmatic bronchitis-mold exposure/seasonal change--can change solumedrol to PO pred 40mg per day, advair (symbicort), spiriva, singulair; pulmicort as well; prednisone taper--40mg for 5 days then 30 for 3, 20 for 3 and 10 for 3 day  GERD-would re=-start reflux rx as she has had this in past  allergic rhinitis--flonase, antihistamine-clarinex              DC planning today and office f/up for 1-2 weeks  Zac Kelley MD-Pulmonary   [phone]

## 2019-03-22 NOTE — DISCHARGE NOTE ANTEPARTUM - MEDICATION SUMMARY - MEDICATIONS TO CHANGE
I will SWITCH the dose or number of times a day I take the medications listed below when I get home from the hospital:    NIFEdipine 60 mg oral tablet, extended release  -- 2 tab(s) by mouth once a day    predniSONE 20 mg oral tablet  -- 2 tab(s) by mouth once a day  -- It is very important that you take or use this exactly as directed.  Do not skip doses or discontinue unless directed by your doctor.  Obtain medical advice before taking any non-prescription drugs as some may affect the action of this medication.  Take with food or milk.    predniSONE 20 mg oral tablet  -- 2 tab(s) by mouth once a day   -- It is very important that you take or use this exactly as directed.  Do not skip doses or discontinue unless directed by your doctor.  Obtain medical advice before taking any non-prescription drugs as some may affect the action of this medication.  Take with food or milk.

## 2019-03-22 NOTE — DISCHARGE NOTE ANTEPARTUM - PROVIDER TOKENS
Left message on machine to call back  Alesia Mtz CMA ([facility])    PROVIDER:[TOKEN:[[id number]]],PROVIDER:[TOKEN:[[id number]]]

## 2019-03-22 NOTE — DISCHARGE NOTE ANTEPARTUM - MEDICATION SUMMARY - MEDICATIONS TO TAKE
I will START or STAY ON the medications listed below when I get home from the hospital:    aspirin 81 mg oral tablet, chewable  -- 1 tab(s) by mouth once a day  -- Indication: For pregnancy    metFORMIN 500 mg oral tablet  -- 1 tab(s) by mouth 2 times a day  -- Indication: For diabetes    ipratropium-albuterol 0.5 mg-2.5 mg/3 mLinhalation solution  -- 3 milliliter(s) inhaled every 6 hours  -- Indication: For asthma    NIFEdipine 90 mg oral tablet, extended release  -- 1 tab(s) by mouth once a day  -- Indication: For asthma    Prenatal Multivitamins with Folic Acid 1 mg oral tablet  -- 1 tab(s) by mouth once a day  -- Indication: For pregnancy    progesterone 200 mg vaginal suppository  -- 1 suppository(ies) vaginally once a day  -- Indication: For Short cervix

## 2019-03-22 NOTE — PROGRESS NOTE ADULT - SUBJECTIVE AND OBJECTIVE BOX
CHIEF COMPLAINT: f/up sob, asthmatic bronchitis, allergies, gerd--better over all-no cough or wheeze    Interval Events: floor bed    REVIEW OF SYSTEMS:  Constitutional: No fevers or chills. No weight loss. No fatigue or generalized malaise.  Eyes: No itching or discharge from the eyes  ENT: No ear pain. No ear discharge. No nasal congestion. No post nasal drip. No epistaxis. No throat pain. No sore throat. No difficulty swallowing.   CV: No chest pain. No palpitations. No lightheadedness or dizziness.   Resp: No dyspnea at rest. + dyspnea on exertion. No orthopnea. No wheezing. No cough. No stridor. No sputum production. No chest pain with respiration.  GI: No nausea. No vomiting. No diarrhea.  MSK: No joint pain or pain in any extremities  Integumentary: No skin lesions. No pedal edema.  Neurological: No gross motor weakness. No sensory changes.  [+ ] All other systems negative  [ ] Unable to assess ROS because ________    OBJECTIVE:  ICU Vital Signs Last 24 Hrs  T(C): 36.8 (22 Mar 2019 05:28), Max: 36.9 (21 Mar 2019 14:35)  T(F): 98.2 (22 Mar 2019 05:28), Max: 98.4 (21 Mar 2019 14:35)  HR: 77 (22 Mar 2019 05:28) (70 - 88)  BP: 130/87 (22 Mar 2019 05:28) (120/82 - 136/91)  BP(mean): --  ABP: --  ABP(mean): --  RR: 18 (22 Mar 2019 05:28) (18 - 18)  SpO2: 96% (22 Mar 2019 05:28) (96% - 98%)        - @ 07:01  -  03-22 @ 06:19  --------------------------------------------------------  IN: 0 mL / OUT: 950 mL / NET: -950 mL      CAPILLARY BLOOD GLUCOSE      POCT Blood Glucose.: 141 mg/dL (21 Mar 2019 20:11)      PHYSICAL EXAM: NAD in bed on RA  General: Awake, alert, oriented X 3.   HEENT: Atraumatic, normocephalic.                 Mallampatti Grade 2                No nasal congestion.                No tonsillar or pharyngeal exudates.  Lymph Nodes: No palpable lymphadenopathy  Neck: No JVD. No carotid bruit.   Respiratory: Normal chest expansion                         Normal percussion                         Normal and equal air entry                         rare exp wheeze,no rhonchi or rales.  Cardiovascular: S1 S2 normal. No murmurs, rubs or gallops.   Abdomen: Soft, non-tender, non-distended. No organomegaly. Normoactive bowel sounds.  Extremities: Warm to touch. Peripheral pulse palpable. No pedal edema.   Skin: No rashes or skin lesions  Neurological: Motor and sensory examination equal and normal in all four extremities.  Psychiatry: Appropriate mood and affect.    HOSPITAL MEDICATIONS:  MEDICATIONS  (STANDING):  ALBUTerol    90 MICROgram(s) HFA Inhaler 1 Puff(s) Inhalation every 4 hours  ALBUTerol/ipratropium for Nebulization 3 milliLiter(s) Nebulizer every 6 hours  aspirin  chewable 81 milliGRAM(s) Oral daily  buDESOnide 160 MICROgram(s)/formoterol 4.5 MICROgram(s) Inhaler 2 Puff(s) Inhalation two times a day  dextrose 5%. 1000 milliLiter(s) (50 mL/Hr) IV Continuous <Continuous>  dextrose 50% Injectable 12.5 Gram(s) IV Push once  dextrose 50% Injectable 25 Gram(s) IV Push once  dextrose 50% Injectable 25 Gram(s) IV Push once  folic acid 1 milliGRAM(s) Oral daily  insulin lispro (HumaLOG) corrective regimen sliding scale   SubCutaneous three times a day before meals  methylPREDNISolone sodium succinate Injectable 20 milliGRAM(s) IV Push every 8 hours  montelukast 10 milliGRAM(s) Oral daily  NIFEdipine XL 90 milliGRAM(s) Oral daily  prenatal multivitamin 1 Tablet(s) Oral daily  progesterone Vaginal Insert 100 milliGRAM(s) Vaginal at bedtime  tiotropium 18 MICROgram(s) Capsule 1 Capsule(s) Inhalation daily    MEDICATIONS  (PRN):  ALBUTerol    90 MICROgram(s) HFA Inhaler 1 Puff(s) Inhalation every 2 hours PRN Shortness of Breath and/or Wheezing  dextrose 40% Gel 15 Gram(s) Oral once PRN Blood Glucose LESS THAN 70 milliGRAM(s)/deciliter  glucagon  Injectable 1 milliGRAM(s) IntraMuscular once PRN Glucose LESS THAN 70 milligrams/deciliter      LABS:                        12.7   10.2  )-----------( 313      ( 21 Mar 2019 06:27 )             37.8         136  |  104  |  8   ----------------------------<  167<H>  4.4   |  19<L>  |  0.47<L>    Ca    9.2      21 Mar 2019 06:02    TPro  6.7  /  Alb  3.7  /  TBili  0.2  /  DBili  x   /  AST  11  /  ALT  11  /  AlkPhos  74      PT/INR - ( 21 Mar 2019 06:27 )   PT: 11.5 sec;   INR: 1.00 ratio         PTT - ( 21 Mar 2019 06:27 )  PTT:25.7 sec  Urinalysis Basic - ( 20 Mar 2019 22:28 )    Color: Light Yellow / Appearance: Slightly Turbid / S.018 / pH: x  Gluc: x / Ketone: Large  / Bili: Negative / Urobili: Negative   Blood: x / Protein: Trace / Nitrite: Negative   Leuk Esterase: Large / RBC: 2 /hpf / WBC 66 /HPF   Sq Epi: x / Non Sq Epi: 5 /hpf / Bacteria: Moderate        Venous Blood Gas:   @ 19:55  7.42/34/43/22/74  VBG Lactate: 1.5      MICROBIOLOGY:     RADIOLOGY:  [ ] Reviewed and interpreted by me    Point of Care Ultrasound Findings:    PFT:    EKG:

## 2019-03-22 NOTE — DISCHARGE NOTE ANTEPARTUM - CARE PLAN
Principal Discharge DX:	Severe asthma  Goal:	recovery  Assessment and plan of treatment:	Monitor blood sugars and f/u w diabetes education  Repeat sonogram in 1 week  Begin prednisone taper as ordered  Secondary Diagnosis:	Essential hypertension

## 2019-03-22 NOTE — DISCHARGE NOTE ANTEPARTUM - PLAN OF CARE
recovery Monitor blood sugars and f/u w diabetes education  Repeat sonogram in 1 week  Begin prednisone taper as ordered

## 2019-03-22 NOTE — DISCHARGE NOTE ANTEPARTUM - HOSPITAL COURSE
Pt admitted at 21 wks GA w asthma exacerbation and worsening hypertension.  Her asthma responded well to steroids and her procardia was increased to control her blood pressure.  She had a comprehensive Ob ultrasound which revealed a shortened cervix and she was started on vaginal progesterone.  Her metformin dose was increased due to her steroid treatment.

## 2019-03-22 NOTE — DISCHARGE NOTE ANTEPARTUM - PATIENT PORTAL LINK FT
You can access the [facility], offered by , by registering with the following website: [email or web address]

## 2019-03-22 NOTE — DISCHARGE NOTE ANTEPARTUM - CARE PROVIDERS DIRECT ADDRESSES
[email or web address]

## 2019-03-23 LAB
COLLECT DURATION TIME UR: 24 HR — SIGNIFICANT CHANGE UP
PROT 24H UR-MRATE: 150 MG/24 H — HIGH (ref 50–100)
TOTAL VOLUME - 24 HOUR: 2500 ML — SIGNIFICANT CHANGE UP
URINE CREATININE CALCULATION: 1.5 G/24 H — SIGNIFICANT CHANGE UP (ref 0.8–1.8)

## 2019-03-25 ENCOUNTER — APPOINTMENT (OUTPATIENT)
Dept: ANTEPARTUM | Facility: CLINIC | Age: 34
End: 2019-03-25
Payer: COMMERCIAL

## 2019-03-25 ENCOUNTER — ASOB RESULT (OUTPATIENT)
Age: 34
End: 2019-03-25

## 2019-03-25 PROBLEM — I10 ESSENTIAL (PRIMARY) HYPERTENSION: Chronic | Status: ACTIVE | Noted: 2019-03-20

## 2019-03-25 PROBLEM — J45.909 UNSPECIFIED ASTHMA, UNCOMPLICATED: Chronic | Status: ACTIVE | Noted: 2019-03-20

## 2019-03-25 PROCEDURE — 93325 DOPPLER ECHO COLOR FLOW MAPG: CPT

## 2019-03-25 PROCEDURE — 76825 ECHO EXAM OF FETAL HEART: CPT

## 2019-03-25 PROCEDURE — 76827 ECHO EXAM OF FETAL HEART: CPT

## 2019-03-26 ENCOUNTER — NON-APPOINTMENT (OUTPATIENT)
Age: 34
End: 2019-03-26

## 2019-03-26 ENCOUNTER — APPOINTMENT (OUTPATIENT)
Dept: PULMONOLOGY | Facility: CLINIC | Age: 34
End: 2019-03-26
Payer: COMMERCIAL

## 2019-03-26 VITALS
DIASTOLIC BLOOD PRESSURE: 80 MMHG | HEART RATE: 119 BPM | HEIGHT: 62 IN | OXYGEN SATURATION: 97 % | RESPIRATION RATE: 17 BRPM | WEIGHT: 152 LBS | BODY MASS INDEX: 27.97 KG/M2 | SYSTOLIC BLOOD PRESSURE: 120 MMHG

## 2019-03-26 PROCEDURE — 99214 OFFICE O/P EST MOD 30 MIN: CPT | Mod: 25

## 2019-03-26 PROCEDURE — 94010 BREATHING CAPACITY TEST: CPT

## 2019-03-26 NOTE — PROCEDURE
[FreeTextEntry1] : PFT- spi reveals mild obstructive dysfunction; FEV1 was 2.25 L which is 77% of predicted;  normal flow volume loop

## 2019-03-26 NOTE — REASON FOR VISIT
[Follow-Up - From Hospitalization] : a hospitalization follow-up [FreeTextEntry1] :  allergic eosinophilia, allergic rhinitis, elevated IgE, prophylactic measure, severe persistent asthma and vitamin D deficiency

## 2019-03-26 NOTE — PHYSICAL EXAM
[Normal Conjunctiva] : the conjunctiva exhibited no abnormalities [Eyelids - No Xanthelasma] : the eyelids demonstrated no xanthelasmas [Normal Oropharynx] : normal oropharynx [II] : II [Neck Appearance] : the appearance of the neck was normal [Neck Cervical Mass (___cm)] : no neck mass was observed [Jugular Venous Distention Increased] : there was no jugular-venous distention [Thyroid Diffuse Enlargement] : the thyroid was not enlarged [Thyroid Nodule] : there were no palpable thyroid nodules [Heart Rate And Rhythm] : heart rate and rhythm were normal [Heart Sounds] : normal S1 and S2 [Murmurs] : no murmurs present [Respiration, Rhythm And Depth] : normal respiratory rhythm and effort [Exaggerated Use Of Accessory Muscles For Inspiration] : no accessory muscle use [Auscultation Breath Sounds / Voice Sounds] : lungs were clear to auscultation bilaterally [Abdomen Soft] : soft [Abdomen Tenderness] : non-tender [Abdomen Mass (___ Cm)] : no abdominal mass palpated [Abnormal Walk] : normal gait [Gait - Sufficient For Exercise Testing] : the gait was sufficient for exercise testing [Nail Clubbing] : no clubbing of the fingernails [Cyanosis, Localized] : no localized cyanosis [Petechial Hemorrhages (___cm)] : no petechial hemorrhages [Skin Color & Pigmentation] : normal skin color and pigmentation [] : no rash [No Venous Stasis] : no venous stasis [Skin Lesions] : no skin lesions [No Skin Ulcers] : no skin ulcer [No Xanthoma] : no  xanthoma was observed [Deep Tendon Reflexes (DTR)] : deep tendon reflexes were 2+ and symmetric [Sensation] : the sensory exam was normal to light touch and pinprick [No Focal Deficits] : no focal deficits [Oriented To Time, Place, And Person] : oriented to person, place, and time [Impaired Insight] : insight and judgment were intact [Affect] : the affect was normal [General Appearance - Well Developed] : well developed [Normal Appearance] : normal appearance [Well Groomed] : well groomed [General Appearance - Well Nourished] : well nourished [No Deformities] : no deformities [General Appearance - In No Acute Distress] : no acute distress [FreeTextEntry1] : I:E ratio 1:3; clear

## 2019-03-26 NOTE — ASSESSMENT
[FreeTextEntry1] : Ms. Blue is a 32 year old female with a history of asthma / GERD / Allergy / overweight. Her asthma is active and is both IgE mediated and eosinophil mediated (currently off on Nucala), and 22 weeks pregnant - s/p flair\par \par problem 1: severe persistent asthma (flair)\par -prednisone taper - 30x3, 20x3, 10x3\par \par Information sheet given to the patient to be reviewed, this medication is never to be used without consulting the prescribing physician. Proper dietary restraint is necessary specifically salt containing foods, if any reaction may occur should be reported.  \par \par *climate related - GERD - avoid mold exposure\par -Continue albuterol by the nebulizer QID\par -continue to use Ventolin PRN and before exercise\par -continue to use Advair 500 at 1 inhalation BId\par -Continue QVar 80 at 2 inhalations BID\par -Continue Spiriva 2 inhalations QD\par -continue to use Singulair 10 mg before bed\par -all meds needed were renewed\par -Inhaler technique reviewed as well as oral hygiene techniques reviewed with patient. Avoidance of cold air, extremes of temperature, rescue inhaler should be used before exercise. Order of medication reviewed with patient. Recommended use of a cool mist humidifier in the bedroom. \par \par problem 2: Allergic Asthma (Elevated IgE)\par -her elevated IgE makes her a candidate for Xolair\par \par Problem 3; Eosinophilic Asthma\par -on Nucala - can hold due to pregnancy - (crosses placental barrier -- Phase 4 study in progress; will reach out to [facility]) \par -The safety and efficacy of Nucala was established in three double-blind, randomized, placebo-controlled trials in patients with severe asthma. Compared to a placebo, patients with severe asthma receiving Nucala had fewer exacerbation requiring hospitalization and/or emergency department visits, and a longer time to first exacerbation. In addition, patients with severe asthma receiving Nucala or Fasenra experienced greater reductions in their daily maintenance oral corticosteroid dose, while maintaining asthma control compared with patients receiving placebo. Treatment with Nucala did not result in a significant improvement in lung function, as measured by the volume of air exhaled by patients in one second. The most common side effects include: headache, injection site reactions, back pain, weakness, and fatigue; hypersensitivity reactions can occur within hours or days including swelling of the face, mouth, and tongue, fainting, dizziness, hives, breathing problems, and rash; herpes zoster infections have occurred. The drug is a monoclonal antibody that inhibits interleukin-5 which helps regular eosinophils, a type of white blood cell that contributes to asthma. The over-production of eosinophils can cause inflammation in the lungs, increasing the frequency of asthma attacks. Patients must also take other medications, including high dose inhaled corticosteroids and at least one additional asthma drug\par \par problem 4: allergic rhinitis\par -continue to use Flonase 1 sniff each nostril BID\par -continue to use Xyzal 5 mg before bed \par -Environmental measures for allergies were encouraged including mattress and pillow cover, air purifier, and environmental controls.\par \par problem 5: low vitamin d\par -continue to use supplemental vitamin D\par -Has been associated with asthma exacerbations and increased allergic symptoms. The goal based on recent information is maintaining levels between 50-70 and low normal is 30. Recommended 50,000 units every two weeks to once a month depending on the level. \par \par problem 6: GERD-stable/controlled\par -Protonix 40 mg QAM \par -Continue Zantac 300 mg QHS\par -Rule of 2s: avoid eating too much, eating too late, eating too spicy, eating two hours before bed\par -Things to avoid including overeating, spicy foods, tight clothing, eating within three hours of bed, this list is not all inclusive. \par -For treatment of reflux, possible options discussed including diet control, H2 blockers, PPIs, as well as coating motility agents discussed as treatment options. Timing of meals and proximity of last meal to sleep were discussed. If symptoms persist, a formal gastrointestinal evaluation is needed. \par \par problem 7: overweight\par -Weight loss, exercise, and diet control were discussed and are highly encouraged. Treatment options were given such as, aqua therapy, and contacting a nutritionist. Recommended to use the elliptical, stationary bike, less use of treadmill. \par \par problem 8: health maintenance \par -recommended yearly flu shot after October 15 2018\par -recommended strep pneumonia vaccines: Prevnar-13 vaccine, followed by Pneumo vaccine 23 one year following\par -recommended early intervention for URIs\par -recommended regular osteoporosis evaluations\par -recommended early dermatological evaluations\par -recommended after the age of 50 to the age of 70, colonoscopy every 5 years \par \par Patient may follow up in 4-6 weeks.\par She is encouraged to call with any changes, concerns, or questions

## 2019-03-26 NOTE — HISTORY OF PRESENT ILLNESS
[FreeTextEntry1] : Ms. Blue is a 33 year old female with a history of allergic eosinophilia, allergic rhinitis, elevated IgE, prophylactic measure, severe persistent asthma and vitamin D deficiency presenting today for a follow up visit following a recent hospitalization for an asthmatic attack. She is currently 22 weeks pregnant. She has no complaints at this time. \par - She states that she was hospitalized on 3/20/2019 for an asthmatic attack following mold exposure at work.\par - She states that she is feeling much better and more like herself.\par - She states that she has been losing weight overall during her pregnancy. \par - She states that her breathing has improved following her hospitalization. \par - She states that her reflux is well controlled with medications. \par - She states that today is her last day of Prednisone 40 mg, and will take 30 mg tomorrow. \par - She denies any headaches, nausea, vomiting, fever, chills, sweats, chest pain, chest pressure, diarrhea, constipation, dysphagia, dizziness, leg swelling, leg pain, itchy eyes, itchy ears, sour taste in the mouth, cough, SOB, wheeze, sinus congestion, rhinitis, joint aches or pains, muscle aches or pains.

## 2019-03-26 NOTE — ADDENDUM
[FreeTextEntry1] : Documented by MERLENE MAE acting as a scribe for Dr. Zac Kelley on 03/26/2019.\par \par All medical record entries made by the Scribe were at my, Dr. Zac Kelley's, direction and personally dictated by me on 03/26/2019. I have reviewed the chart and agree that the record accurately reflects my personal performance of the history, physical exam, assessment and plan. I have also personally directed, reviewed, and agree with the discharge instructions.

## 2019-03-28 ENCOUNTER — APPOINTMENT (OUTPATIENT)
Dept: ANTEPARTUM | Facility: CLINIC | Age: 34
End: 2019-03-28
Payer: COMMERCIAL

## 2019-03-28 ENCOUNTER — ASOB RESULT (OUTPATIENT)
Age: 34
End: 2019-03-28

## 2019-03-28 PROCEDURE — 76815 OB US LIMITED FETUS(S): CPT

## 2019-03-28 PROCEDURE — 76817 TRANSVAGINAL US OBSTETRIC: CPT

## 2019-03-29 ENCOUNTER — APPOINTMENT (OUTPATIENT)
Dept: MATERNAL FETAL MEDICINE | Facility: CLINIC | Age: 34
End: 2019-03-29

## 2019-04-02 ENCOUNTER — APPOINTMENT (OUTPATIENT)
Dept: OBGYN | Facility: CLINIC | Age: 34
End: 2019-04-02
Payer: COMMERCIAL

## 2019-04-02 ENCOUNTER — NON-APPOINTMENT (OUTPATIENT)
Age: 34
End: 2019-04-02

## 2019-04-02 VITALS
BODY MASS INDEX: 28.89 KG/M2 | SYSTOLIC BLOOD PRESSURE: 138 MMHG | WEIGHT: 157 LBS | HEIGHT: 62 IN | DIASTOLIC BLOOD PRESSURE: 80 MMHG

## 2019-04-02 PROCEDURE — 0502F SUBSEQUENT PRENATAL CARE: CPT

## 2019-04-05 ENCOUNTER — APPOINTMENT (OUTPATIENT)
Dept: MATERNAL FETAL MEDICINE | Facility: CLINIC | Age: 34
End: 2019-04-05
Payer: COMMERCIAL

## 2019-04-05 ENCOUNTER — APPOINTMENT (OUTPATIENT)
Dept: ANTEPARTUM | Facility: CLINIC | Age: 34
End: 2019-04-05
Payer: COMMERCIAL

## 2019-04-05 ENCOUNTER — ASOB RESULT (OUTPATIENT)
Age: 34
End: 2019-04-05

## 2019-04-05 PROCEDURE — G0108 DIAB MANAGE TRN  PER INDIV: CPT

## 2019-04-05 PROCEDURE — 76816 OB US FOLLOW-UP PER FETUS: CPT

## 2019-04-05 PROCEDURE — 99242 OFF/OP CONSLTJ NEW/EST SF 20: CPT | Mod: 25

## 2019-04-05 PROCEDURE — 76817 TRANSVAGINAL US OBSTETRIC: CPT

## 2019-04-10 ENCOUNTER — NON-APPOINTMENT (OUTPATIENT)
Age: 34
End: 2019-04-10

## 2019-04-10 ENCOUNTER — TRANSCRIPTION ENCOUNTER (OUTPATIENT)
Age: 34
End: 2019-04-10

## 2019-04-10 ENCOUNTER — APPOINTMENT (OUTPATIENT)
Dept: CARDIOLOGY | Facility: CLINIC | Age: 34
End: 2019-04-10
Payer: COMMERCIAL

## 2019-04-10 VITALS
DIASTOLIC BLOOD PRESSURE: 76 MMHG | BODY MASS INDEX: 28.89 KG/M2 | HEART RATE: 105 BPM | HEIGHT: 62 IN | OXYGEN SATURATION: 97 % | WEIGHT: 157 LBS | SYSTOLIC BLOOD PRESSURE: 116 MMHG

## 2019-04-10 PROCEDURE — 93000 ELECTROCARDIOGRAM COMPLETE: CPT

## 2019-04-10 PROCEDURE — 99205 OFFICE O/P NEW HI 60 MIN: CPT

## 2019-04-10 NOTE — PHYSICAL EXAM
[General Appearance - Well Developed] : well developed [Normal Appearance] : normal appearance [General Appearance - Well Nourished] : well nourished [Well Groomed] : well groomed [General Appearance - In No Acute Distress] : no acute distress [No Deformities] : no deformities [Normal Conjunctiva] : the conjunctiva exhibited no abnormalities [Eyelids - No Xanthelasma] : the eyelids demonstrated no xanthelasmas [Normal Oral Mucosa] : normal oral mucosa [No Oral Pallor] : no oral pallor [Normal Jugular Venous A Waves Present] : normal jugular venous A waves present [No Oral Cyanosis] : no oral cyanosis [Normal Jugular Venous V Waves Present] : normal jugular venous V waves present [No Jugular Venous Roberts A Waves] : no jugular venous roberts A waves [Heart Sounds] : normal S1 and S2 [Heart Rate And Rhythm] : heart rate and rhythm were normal [Murmurs] : no murmurs present [Respiration, Rhythm And Depth] : normal respiratory rhythm and effort [Auscultation Breath Sounds / Voice Sounds] : lungs were clear to auscultation bilaterally [Exaggerated Use Of Accessory Muscles For Inspiration] : no accessory muscle use [Abdomen Soft] : soft [Abdomen Tenderness] : non-tender [Abdomen Mass (___ Cm)] : no abdominal mass palpated [Abnormal Walk] : normal gait [Gait - Sufficient For Exercise Testing] : the gait was sufficient for exercise testing [Nail Clubbing] : no clubbing of the fingernails [Cyanosis, Localized] : no localized cyanosis [Petechial Hemorrhages (___cm)] : no petechial hemorrhages [Skin Color & Pigmentation] : normal skin color and pigmentation [] : no rash [No Venous Stasis] : no venous stasis [No Skin Ulcers] : no skin ulcer [Skin Lesions] : no skin lesions [No Xanthoma] : no  xanthoma was observed [Affect] : the affect was normal [Oriented To Time, Place, And Person] : oriented to person, place, and time [Mood] : the mood was normal [No Anxiety] : not feeling anxious

## 2019-04-10 NOTE — HISTORY OF PRESENT ILLNESS
[FreeTextEntry1] : Patient is a 33 year old female with a history of gestational HTN, preeclampsia,  allergic eosinophilia, allergic rhinitis, elevated IgE, prophylactic measure, severe persistent asthma and vitamin D deficiency presenting today to establish care in setting of recent hospitalization for an asthmatic attack. She is currently 24 weeks pregnant. She has no complaints at this time. \par \par - 1st pregnancy - gestational HTN - was on procardia with subsequent preeclampsia - at 38 weeks vaginal delivery 2014. Post the delivery stayed on meds - was changed to Benicar and then switched back to procardia when became pregnant\par - She was hospitalized on 3/20/2019 for an asthmatic attack following mold exposure at work. She is feeling much better and more like herself. She has been losing weight overall during her pregnancy. \par As per the patient, her breathing has improved following her hospitalization. SHe was on a prednisone taper post the hospitalization\par

## 2019-04-10 NOTE — DISCUSSION/SUMMARY
[FreeTextEntry1] : Patient is a 33 year old female with a history of gestational HTN, preeclampsia,  allergic eosinophilia, allergic rhinitis, elevated IgE, prophylactic measure, severe persistent asthma and vitamin D deficiency presenting today to establish care in setting of recent hospitalization for an asthmatic attack. She is currently 24 weeks pregnant. She has no complaints at this time. \par \par - 1st pregnancy - gestational HTN - was on procardia with subsequent preeclampsia - at 38 weeks vaginal delivery 2014. Post the delivery stayed on meds - was changed to Benicar and then switched back to procardia when became pregnant\par - on procardia with BPs well controlled at home,e\par - ECG with no acute changes\par - echo done 3/2019 - with normal systolic and diastolic heart failure

## 2019-04-16 ENCOUNTER — OUTPATIENT (OUTPATIENT)
Dept: OUTPATIENT SERVICES | Facility: HOSPITAL | Age: 34
LOS: 1 days | End: 2019-04-16
Payer: COMMERCIAL

## 2019-04-16 ENCOUNTER — NON-APPOINTMENT (OUTPATIENT)
Age: 34
End: 2019-04-16

## 2019-04-16 ENCOUNTER — CLINICAL ADVICE (OUTPATIENT)
Age: 34
End: 2019-04-16

## 2019-04-16 ENCOUNTER — APPOINTMENT (OUTPATIENT)
Dept: OBGYN | Facility: CLINIC | Age: 34
End: 2019-04-16
Payer: COMMERCIAL

## 2019-04-16 VITALS
DIASTOLIC BLOOD PRESSURE: 80 MMHG | WEIGHT: 156 LBS | SYSTOLIC BLOOD PRESSURE: 142 MMHG | HEIGHT: 62 IN | BODY MASS INDEX: 28.71 KG/M2

## 2019-04-16 VITALS — SYSTOLIC BLOOD PRESSURE: 130 MMHG | HEIGHT: 62 IN | BODY MASS INDEX: 28.53 KG/M2 | DIASTOLIC BLOOD PRESSURE: 80 MMHG

## 2019-04-16 DIAGNOSIS — O26.899 OTHER SPECIFIED PREGNANCY RELATED CONDITIONS, UNSPECIFIED TRIMESTER: ICD-10-CM

## 2019-04-16 DIAGNOSIS — Z3A.00 WEEKS OF GESTATION OF PREGNANCY NOT SPECIFIED: ICD-10-CM

## 2019-04-16 LAB
BASOPHILS # BLD AUTO: 0.03 K/UL
BASOPHILS NFR BLD AUTO: 0.4 %
EOSINOPHIL # BLD AUTO: 0.55 K/UL
EOSINOPHIL NFR BLD AUTO: 7.2 %
HCT VFR BLD CALC: 39.1 %
HGB BLD-MCNC: 12.4 G/DL
IMM GRANULOCYTES NFR BLD AUTO: 0.3 %
LYMPHOCYTES # BLD AUTO: 1.17 K/UL
LYMPHOCYTES NFR BLD AUTO: 15.3 %
MAN DIFF?: NORMAL
MCHC RBC-ENTMCNC: 29.5 PG
MCHC RBC-ENTMCNC: 31.7 GM/DL
MCV RBC AUTO: 93.1 FL
MONOCYTES # BLD AUTO: 0.49 K/UL
MONOCYTES NFR BLD AUTO: 6.4 %
NEUTROPHILS # BLD AUTO: 5.41 K/UL
NEUTROPHILS NFR BLD AUTO: 70.4 %
PLATELET # BLD AUTO: 306 K/UL
RBC # BLD: 4.2 M/UL
RBC # FLD: 14.3 %
WBC # FLD AUTO: 7.67 K/UL

## 2019-04-16 PROCEDURE — G0463: CPT

## 2019-04-16 PROCEDURE — 0502F SUBSEQUENT PRENATAL CARE: CPT

## 2019-04-17 ENCOUNTER — APPOINTMENT (OUTPATIENT)
Dept: MATERNAL FETAL MEDICINE | Facility: CLINIC | Age: 34
End: 2019-04-17

## 2019-04-17 ENCOUNTER — ASOB RESULT (OUTPATIENT)
Age: 34
End: 2019-04-17

## 2019-04-17 ENCOUNTER — APPOINTMENT (OUTPATIENT)
Dept: ANTEPARTUM | Facility: CLINIC | Age: 34
End: 2019-04-17
Payer: COMMERCIAL

## 2019-04-17 ENCOUNTER — APPOINTMENT (OUTPATIENT)
Dept: MATERNAL FETAL MEDICINE | Facility: CLINIC | Age: 34
End: 2019-04-17
Payer: COMMERCIAL

## 2019-04-17 VITALS
DIASTOLIC BLOOD PRESSURE: 80 MMHG | HEART RATE: 108 BPM | HEIGHT: 62 IN | WEIGHT: 156.5 LBS | SYSTOLIC BLOOD PRESSURE: 116 MMHG | BODY MASS INDEX: 28.8 KG/M2 | OXYGEN SATURATION: 97 %

## 2019-04-17 LAB
GLUCOSE 1H P 50 G GLC PO SERPL-MCNC: 106 MG/DL
HIV1+2 AB SPEC QL IA.RAPID: NONREACTIVE

## 2019-04-17 PROCEDURE — 76817 TRANSVAGINAL US OBSTETRIC: CPT

## 2019-04-17 PROCEDURE — G0108 DIAB MANAGE TRN  PER INDIV: CPT

## 2019-04-17 PROCEDURE — 76815 OB US LIMITED FETUS(S): CPT

## 2019-04-30 ENCOUNTER — NON-APPOINTMENT (OUTPATIENT)
Age: 34
End: 2019-04-30

## 2019-04-30 ENCOUNTER — APPOINTMENT (OUTPATIENT)
Dept: OBGYN | Facility: CLINIC | Age: 34
End: 2019-04-30
Payer: COMMERCIAL

## 2019-04-30 VITALS
DIASTOLIC BLOOD PRESSURE: 70 MMHG | HEIGHT: 62 IN | BODY MASS INDEX: 28.89 KG/M2 | WEIGHT: 157 LBS | SYSTOLIC BLOOD PRESSURE: 136 MMHG

## 2019-04-30 PROCEDURE — 0502F SUBSEQUENT PRENATAL CARE: CPT

## 2019-05-01 ENCOUNTER — ASOB RESULT (OUTPATIENT)
Age: 34
End: 2019-05-01

## 2019-05-01 ENCOUNTER — APPOINTMENT (OUTPATIENT)
Dept: ANTEPARTUM | Facility: CLINIC | Age: 34
End: 2019-05-01
Payer: COMMERCIAL

## 2019-05-01 ENCOUNTER — APPOINTMENT (OUTPATIENT)
Dept: MATERNAL FETAL MEDICINE | Facility: CLINIC | Age: 34
End: 2019-05-01
Payer: COMMERCIAL

## 2019-05-01 PROCEDURE — G0108 DIAB MANAGE TRN  PER INDIV: CPT

## 2019-05-01 PROCEDURE — 76816 OB US FOLLOW-UP PER FETUS: CPT

## 2019-05-10 ENCOUNTER — MEDICATION RENEWAL (OUTPATIENT)
Age: 34
End: 2019-05-10

## 2019-05-10 ENCOUNTER — TRANSCRIPTION ENCOUNTER (OUTPATIENT)
Age: 34
End: 2019-05-10

## 2019-05-14 ENCOUNTER — APPOINTMENT (OUTPATIENT)
Dept: OBGYN | Facility: CLINIC | Age: 34
End: 2019-05-14
Payer: COMMERCIAL

## 2019-05-14 ENCOUNTER — NON-APPOINTMENT (OUTPATIENT)
Age: 34
End: 2019-05-14

## 2019-05-14 ENCOUNTER — MESSAGE (OUTPATIENT)
Age: 34
End: 2019-05-14

## 2019-05-14 VITALS
SYSTOLIC BLOOD PRESSURE: 140 MMHG | HEIGHT: 62 IN | DIASTOLIC BLOOD PRESSURE: 90 MMHG | WEIGHT: 154 LBS | BODY MASS INDEX: 28.34 KG/M2

## 2019-05-14 VITALS — DIASTOLIC BLOOD PRESSURE: 75 MMHG | BODY MASS INDEX: 28.17 KG/M2 | HEIGHT: 62 IN | SYSTOLIC BLOOD PRESSURE: 120 MMHG

## 2019-05-14 PROCEDURE — 0502F SUBSEQUENT PRENATAL CARE: CPT

## 2019-05-18 ENCOUNTER — INPATIENT (INPATIENT)
Facility: HOSPITAL | Age: 34
LOS: 0 days | Discharge: HOME | DRG: 832 | End: 2019-05-19
Attending: OBSTETRICS & GYNECOLOGY | Admitting: OBSTETRICS & GYNECOLOGY
Payer: COMMERCIAL

## 2019-05-18 VITALS
HEIGHT: 62 IN | WEIGHT: 154.1 LBS | HEART RATE: 95 BPM | RESPIRATION RATE: 18 BRPM | TEMPERATURE: 98 F | OXYGEN SATURATION: 94 % | SYSTOLIC BLOOD PRESSURE: 149 MMHG | DIASTOLIC BLOOD PRESSURE: 109 MMHG

## 2019-05-18 PROCEDURE — 99285 EMERGENCY DEPT VISIT HI MDM: CPT | Mod: 25

## 2019-05-18 NOTE — ED PROVIDER NOTE - ATTENDING CONTRIBUTION TO CARE
Patient currently 29 weeks pregnant with difficult to control asthma during this pregnancy presenting complaining of shortness of breath.  No associated fevers or chills, no chest pains, no abdominal pains, no vaginal bleeding or discharge.  No known sick contacts.  Taking home asthma medications with minimal relief.    A 14 point review of systems is negative except as in HPI or otherwise documented.    Exam:  General: Patient well appearing, hypertensive otherwise vital signs within normal limits  HEENT: airway patent with moist mucous membranes  Cardiac: RRR S1/S2 with strong peripheral pulses  Respiratory: diffusely wheezing on expiration, no respiratory distress, no accessory muscle use  GI: abdomen gravid, non tender  Neuro: no gross neurologic deficits  Skin: warm, well perfused  Psych: normal mood and affect    Patient with asthma difficult to control this pregnancy presenting with wheezing not responding to home treatment, plan for labs, nebs, steroids, OB consultation, dispo to be determined by response to treatment.

## 2019-05-18 NOTE — ED PROVIDER NOTE - NS ED ROS FT
CONSTITUTIONAL: No fevers, no chills  Eyes: no visual changes  Mouth/Throat: no sore throat  Cardiovascular: No Chest pain  Respiratory: refer to HPI  Gastrointestinal: No n/v/d, no abd pain  Genitourinary: no dysuria, no hematuria  SKIN: no rashes.  PSYCHIATRIC: no known mental health issues.

## 2019-05-18 NOTE — ED PROVIDER NOTE - OBJECTIVE STATEMENT
32 yo  at 29wks pregnant PMH of severe persistent asthma, GERD, cHTN and DM2 pw cc of SOB    Sob few days, has been using all of asthma meds and prn albuterol and nebulizer every 4 hours without relief. Was admitted for asthma in march, that was the last time she had steroids. This feels like her asthma.  No recent flights, no hx of blood clot.  No F/C, No cough, no N/V/D.   Has never been intubated or admitted to the ICU.    OBGYN: Dr. Marcin Duff  PCP: John edmond  Pulmonologist: Dr. Zac Marks    meds: procardia, aspirin, metformin, spirivia, qvar, advair, ventolin, singulair, protonoix

## 2019-05-18 NOTE — ED ADULT TRIAGE NOTE - CHIEF COMPLAINT QUOTE
shortness of breath. Pt 29 weeks pregnant.  Used nebulizer 40 minutes ago. Pt hypertensive at triage. Pt states that always happens when she uses her nebulizer

## 2019-05-19 ENCOUNTER — TRANSCRIPTION ENCOUNTER (OUTPATIENT)
Age: 34
End: 2019-05-19

## 2019-05-19 VITALS
OXYGEN SATURATION: 95 % | DIASTOLIC BLOOD PRESSURE: 99 MMHG | SYSTOLIC BLOOD PRESSURE: 152 MMHG | RESPIRATION RATE: 18 BRPM | TEMPERATURE: 98 F | HEART RATE: 86 BPM

## 2019-05-19 DIAGNOSIS — K21.9 GASTRO-ESOPHAGEAL REFLUX DISEASE WITHOUT ESOPHAGITIS: ICD-10-CM

## 2019-05-19 DIAGNOSIS — Z34.90 ENCOUNTER FOR SUPERVISION OF NORMAL PREGNANCY, UNSPECIFIED, UNSPECIFIED TRIMESTER: ICD-10-CM

## 2019-05-19 DIAGNOSIS — Z29.9 ENCOUNTER FOR PROPHYLACTIC MEASURES, UNSPECIFIED: ICD-10-CM

## 2019-05-19 DIAGNOSIS — J45.41 MODERATE PERSISTENT ASTHMA WITH (ACUTE) EXACERBATION: ICD-10-CM

## 2019-05-19 DIAGNOSIS — J45.51 SEVERE PERSISTENT ASTHMA WITH (ACUTE) EXACERBATION: ICD-10-CM

## 2019-05-19 DIAGNOSIS — J30.9 ALLERGIC RHINITIS, UNSPECIFIED: ICD-10-CM

## 2019-05-19 DIAGNOSIS — R06.02 SHORTNESS OF BREATH: ICD-10-CM

## 2019-05-19 LAB
ALBUMIN SERPL ELPH-MCNC: 3.5 G/DL — SIGNIFICANT CHANGE UP (ref 3.3–5)
ALP SERPL-CCNC: 106 U/L — SIGNIFICANT CHANGE UP (ref 40–120)
ALT FLD-CCNC: 18 U/L — SIGNIFICANT CHANGE UP (ref 10–45)
ANION GAP SERPL CALC-SCNC: 12 MMOL/L — SIGNIFICANT CHANGE UP (ref 5–17)
APPEARANCE UR: ABNORMAL
APTT BLD: 25.1 SEC — LOW (ref 27.5–36.3)
AST SERPL-CCNC: 16 U/L — SIGNIFICANT CHANGE UP (ref 10–40)
BASOPHILS # BLD AUTO: 0.1 K/UL — SIGNIFICANT CHANGE UP (ref 0–0.2)
BASOPHILS NFR BLD AUTO: 1 % — SIGNIFICANT CHANGE UP (ref 0–2)
BILIRUB SERPL-MCNC: 0.2 MG/DL — SIGNIFICANT CHANGE UP (ref 0.2–1.2)
BILIRUB UR-MCNC: NEGATIVE — SIGNIFICANT CHANGE UP
BUN SERPL-MCNC: 5 MG/DL — LOW (ref 7–23)
CALCIUM SERPL-MCNC: 9 MG/DL — SIGNIFICANT CHANGE UP (ref 8.4–10.5)
CHLORIDE SERPL-SCNC: 106 MMOL/L — SIGNIFICANT CHANGE UP (ref 96–108)
CO2 SERPL-SCNC: 21 MMOL/L — LOW (ref 22–31)
COLOR SPEC: SIGNIFICANT CHANGE UP
CREAT SERPL-MCNC: 0.59 MG/DL — SIGNIFICANT CHANGE UP (ref 0.5–1.3)
DIFF PNL FLD: NEGATIVE — SIGNIFICANT CHANGE UP
EOSINOPHIL # BLD AUTO: 1.5 K/UL — HIGH (ref 0–0.5)
EOSINOPHIL NFR BLD AUTO: 14.1 % — HIGH (ref 0–6)
FIBRINOGEN PPP-MCNC: 623 MG/DL — HIGH (ref 350–510)
GLUCOSE BLDC GLUCOMTR-MCNC: 111 MG/DL — HIGH (ref 70–99)
GLUCOSE BLDC GLUCOMTR-MCNC: 136 MG/DL — HIGH (ref 70–99)
GLUCOSE SERPL-MCNC: 104 MG/DL — HIGH (ref 70–99)
GLUCOSE UR QL: NEGATIVE — SIGNIFICANT CHANGE UP
HCT VFR BLD CALC: 36.5 % — SIGNIFICANT CHANGE UP (ref 34.5–45)
HGB BLD-MCNC: 12.8 G/DL — SIGNIFICANT CHANGE UP (ref 11.5–15.5)
INR BLD: 0.95 RATIO — SIGNIFICANT CHANGE UP (ref 0.88–1.16)
KETONES UR-MCNC: ABNORMAL
LEUKOCYTE ESTERASE UR-ACNC: ABNORMAL
LYMPHOCYTES # BLD AUTO: 2.2 K/UL — SIGNIFICANT CHANGE UP (ref 1–3.3)
LYMPHOCYTES # BLD AUTO: 21.2 % — SIGNIFICANT CHANGE UP (ref 13–44)
MCHC RBC-ENTMCNC: 30.7 PG — SIGNIFICANT CHANGE UP (ref 27–34)
MCHC RBC-ENTMCNC: 34.9 GM/DL — SIGNIFICANT CHANGE UP (ref 32–36)
MCV RBC AUTO: 88 FL — SIGNIFICANT CHANGE UP (ref 80–100)
MONOCYTES # BLD AUTO: 0.7 K/UL — SIGNIFICANT CHANGE UP (ref 0–0.9)
MONOCYTES NFR BLD AUTO: 6.9 % — SIGNIFICANT CHANGE UP (ref 2–14)
NEUTROPHILS # BLD AUTO: 6 K/UL — SIGNIFICANT CHANGE UP (ref 1.8–7.4)
NEUTROPHILS NFR BLD AUTO: 56.8 % — SIGNIFICANT CHANGE UP (ref 43–77)
NITRITE UR-MCNC: NEGATIVE — SIGNIFICANT CHANGE UP
PH UR: 6.5 — SIGNIFICANT CHANGE UP (ref 5–8)
PLATELET # BLD AUTO: 323 K/UL — SIGNIFICANT CHANGE UP (ref 150–400)
POTASSIUM SERPL-MCNC: 3.8 MMOL/L — SIGNIFICANT CHANGE UP (ref 3.5–5.3)
POTASSIUM SERPL-SCNC: 3.8 MMOL/L — SIGNIFICANT CHANGE UP (ref 3.5–5.3)
PROT SERPL-MCNC: 6.9 G/DL — SIGNIFICANT CHANGE UP (ref 6–8.3)
PROT UR-MCNC: SIGNIFICANT CHANGE UP
PROTHROM AB SERPL-ACNC: 10.9 SEC — SIGNIFICANT CHANGE UP (ref 10–12.9)
RAPID RVP RESULT: SIGNIFICANT CHANGE UP
RBC # BLD: 4.15 M/UL — SIGNIFICANT CHANGE UP (ref 3.8–5.2)
RBC # FLD: 12.5 % — SIGNIFICANT CHANGE UP (ref 10.3–14.5)
SODIUM SERPL-SCNC: 139 MMOL/L — SIGNIFICANT CHANGE UP (ref 135–145)
SP GR SPEC: 1.01 — SIGNIFICANT CHANGE UP (ref 1.01–1.02)
UROBILINOGEN FLD QL: NEGATIVE — SIGNIFICANT CHANGE UP
WBC # BLD: 10.5 K/UL — SIGNIFICANT CHANGE UP (ref 3.8–10.5)
WBC # FLD AUTO: 10.5 K/UL — SIGNIFICANT CHANGE UP (ref 3.8–10.5)

## 2019-05-19 PROCEDURE — 87486 CHLMYD PNEUM DNA AMP PROBE: CPT

## 2019-05-19 PROCEDURE — 94640 AIRWAY INHALATION TREATMENT: CPT

## 2019-05-19 PROCEDURE — 87798 DETECT AGENT NOS DNA AMP: CPT

## 2019-05-19 PROCEDURE — 99285 EMERGENCY DEPT VISIT HI MDM: CPT | Mod: 25

## 2019-05-19 PROCEDURE — 85384 FIBRINOGEN ACTIVITY: CPT

## 2019-05-19 PROCEDURE — 87086 URINE CULTURE/COLONY COUNT: CPT

## 2019-05-19 PROCEDURE — 99222 1ST HOSP IP/OBS MODERATE 55: CPT

## 2019-05-19 PROCEDURE — 80053 COMPREHEN METABOLIC PANEL: CPT

## 2019-05-19 PROCEDURE — 87581 M.PNEUMON DNA AMP PROBE: CPT

## 2019-05-19 PROCEDURE — 87633 RESP VIRUS 12-25 TARGETS: CPT

## 2019-05-19 PROCEDURE — 87653 STREP B DNA AMP PROBE: CPT

## 2019-05-19 PROCEDURE — 81001 URINALYSIS AUTO W/SCOPE: CPT

## 2019-05-19 PROCEDURE — 85730 THROMBOPLASTIN TIME PARTIAL: CPT

## 2019-05-19 PROCEDURE — 85027 COMPLETE CBC AUTOMATED: CPT

## 2019-05-19 PROCEDURE — 59025 FETAL NON-STRESS TEST: CPT

## 2019-05-19 PROCEDURE — 85610 PROTHROMBIN TIME: CPT

## 2019-05-19 PROCEDURE — 82962 GLUCOSE BLOOD TEST: CPT

## 2019-05-19 RX ORDER — DEXTROSE 50 % IN WATER 50 %
15 SYRINGE (ML) INTRAVENOUS ONCE
Refills: 0 | Status: DISCONTINUED | OUTPATIENT
Start: 2019-05-19 | End: 2019-05-19

## 2019-05-19 RX ORDER — IPRATROPIUM/ALBUTEROL SULFATE 18-103MCG
3 AEROSOL WITH ADAPTER (GRAM) INHALATION EVERY 6 HOURS
Refills: 0 | Status: DISCONTINUED | OUTPATIENT
Start: 2019-05-19 | End: 2019-05-19

## 2019-05-19 RX ORDER — IPRATROPIUM/ALBUTEROL SULFATE 18-103MCG
3 AEROSOL WITH ADAPTER (GRAM) INHALATION ONCE
Refills: 0 | Status: COMPLETED | OUTPATIENT
Start: 2019-05-19 | End: 2019-05-19

## 2019-05-19 RX ORDER — DEXTROSE 50 % IN WATER 50 %
12.5 SYRINGE (ML) INTRAVENOUS ONCE
Refills: 0 | Status: DISCONTINUED | OUTPATIENT
Start: 2019-05-19 | End: 2019-05-19

## 2019-05-19 RX ORDER — IPRATROPIUM/ALBUTEROL SULFATE 18-103MCG
3 AEROSOL WITH ADAPTER (GRAM) INHALATION
Qty: 0 | Refills: 0 | DISCHARGE

## 2019-05-19 RX ORDER — DEXTROSE 50 % IN WATER 50 %
25 SYRINGE (ML) INTRAVENOUS ONCE
Refills: 0 | Status: DISCONTINUED | OUTPATIENT
Start: 2019-05-19 | End: 2019-05-19

## 2019-05-19 RX ORDER — MONTELUKAST 4 MG/1
10 TABLET, CHEWABLE ORAL DAILY
Refills: 0 | Status: DISCONTINUED | OUTPATIENT
Start: 2019-05-19 | End: 2019-05-19

## 2019-05-19 RX ORDER — GLUCAGON INJECTION, SOLUTION 0.5 MG/.1ML
1 INJECTION, SOLUTION SUBCUTANEOUS ONCE
Refills: 0 | Status: DISCONTINUED | OUTPATIENT
Start: 2019-05-19 | End: 2019-05-19

## 2019-05-19 RX ORDER — ALBUTEROL 90 UG/1
1 AEROSOL, METERED ORAL
Qty: 0 | Refills: 0 | DISCHARGE

## 2019-05-19 RX ORDER — ALBUTEROL 90 UG/1
1 AEROSOL, METERED ORAL
Qty: 0 | Refills: 0 | DISCHARGE
Start: 2019-05-19

## 2019-05-19 RX ORDER — INSULIN LISPRO 100/ML
VIAL (ML) SUBCUTANEOUS
Refills: 0 | Status: DISCONTINUED | OUTPATIENT
Start: 2019-05-19 | End: 2019-05-19

## 2019-05-19 RX ORDER — SODIUM CHLORIDE 9 MG/ML
1000 INJECTION, SOLUTION INTRAVENOUS
Refills: 0 | Status: DISCONTINUED | OUTPATIENT
Start: 2019-05-19 | End: 2019-05-19

## 2019-05-19 RX ORDER — METFORMIN HYDROCHLORIDE 850 MG/1
500 TABLET ORAL
Refills: 0 | Status: DISCONTINUED | OUTPATIENT
Start: 2019-05-19 | End: 2019-05-19

## 2019-05-19 RX ORDER — BUDESONIDE, MICRONIZED 100 %
1 POWDER (GRAM) MISCELLANEOUS
Qty: 0 | Refills: 0 | DISCHARGE

## 2019-05-19 RX ORDER — ASPIRIN/CALCIUM CARB/MAGNESIUM 324 MG
81 TABLET ORAL DAILY
Refills: 0 | Status: DISCONTINUED | OUTPATIENT
Start: 2019-05-19 | End: 2019-05-19

## 2019-05-19 RX ORDER — LORATADINE 10 MG/1
10 TABLET ORAL DAILY
Refills: 0 | Status: DISCONTINUED | OUTPATIENT
Start: 2019-05-19 | End: 2019-05-19

## 2019-05-19 RX ORDER — NIFEDIPINE 30 MG
90 TABLET, EXTENDED RELEASE 24 HR ORAL ONCE
Refills: 0 | Status: ACTIVE | OUTPATIENT
Start: 2019-05-19 | End: 2019-05-19

## 2019-05-19 RX ORDER — ALBUTEROL 90 UG/1
1 AEROSOL, METERED ORAL ONCE
Refills: 0 | Status: ACTIVE | OUTPATIENT
Start: 2019-05-19 | End: 2019-05-19

## 2019-05-19 RX ORDER — LORATADINE 10 MG/1
1 TABLET ORAL
Qty: 0 | Refills: 0 | DISCHARGE
Start: 2019-05-19

## 2019-05-19 RX ORDER — METFORMIN HYDROCHLORIDE 850 MG/1
500 TABLET ORAL ONCE
Refills: 0 | Status: ACTIVE | OUTPATIENT
Start: 2019-05-19 | End: 2019-05-19

## 2019-05-19 RX ORDER — FLUTICASONE PROPIONATE AND SALMETEROL 50; 250 UG/1; UG/1
1 POWDER ORAL; RESPIRATORY (INHALATION)
Qty: 0 | Refills: 0 | DISCHARGE

## 2019-05-19 RX ORDER — BECLOMETHASONE DIPROPIONATE 40 UG/1
1 AEROSOL, METERED RESPIRATORY (INHALATION)
Qty: 0 | Refills: 0 | DISCHARGE

## 2019-05-19 RX ORDER — PANTOPRAZOLE SODIUM 20 MG/1
40 TABLET, DELAYED RELEASE ORAL
Refills: 0 | Status: DISCONTINUED | OUTPATIENT
Start: 2019-05-19 | End: 2019-05-19

## 2019-05-19 RX ORDER — TIOTROPIUM BROMIDE 18 UG/1
2 CAPSULE ORAL; RESPIRATORY (INHALATION)
Qty: 0 | Refills: 0 | DISCHARGE

## 2019-05-19 RX ORDER — MONTELUKAST 4 MG/1
10 TABLET, CHEWABLE ORAL ONCE
Refills: 0 | Status: ACTIVE | OUTPATIENT
Start: 2019-05-19 | End: 2019-05-19

## 2019-05-19 RX ORDER — PROGESTERONE 200 MG/1
200 CAPSULE, LIQUID FILLED ORAL AT BEDTIME
Refills: 0 | Status: DISCONTINUED | OUTPATIENT
Start: 2019-05-19 | End: 2019-05-19

## 2019-05-19 RX ORDER — LORATADINE 10 MG/1
10 TABLET ORAL DAILY
Refills: 0 | Status: ACTIVE | OUTPATIENT
Start: 2019-05-19 | End: 2020-04-16

## 2019-05-19 RX ORDER — NIFEDIPINE 30 MG
90 TABLET, EXTENDED RELEASE 24 HR ORAL DAILY
Refills: 0 | Status: DISCONTINUED | OUTPATIENT
Start: 2019-05-19 | End: 2019-05-19

## 2019-05-19 RX ORDER — ASPIRIN/CALCIUM CARB/MAGNESIUM 324 MG
81 TABLET ORAL DAILY
Refills: 0 | Status: ACTIVE | OUTPATIENT
Start: 2019-05-19 | End: 2020-04-16

## 2019-05-19 RX ORDER — ALBUTEROL 90 UG/1
2 AEROSOL, METERED ORAL EVERY 6 HOURS
Refills: 0 | Status: DISCONTINUED | OUTPATIENT
Start: 2019-05-19 | End: 2019-05-19

## 2019-05-19 RX ADMIN — Medication 3 MILLILITER(S): at 00:17

## 2019-05-19 RX ADMIN — Medication 3 MILLILITER(S): at 08:00

## 2019-05-19 RX ADMIN — METFORMIN HYDROCHLORIDE 500 MILLIGRAM(S): 850 TABLET ORAL at 09:13

## 2019-05-19 RX ADMIN — Medication 90 MILLIGRAM(S): at 09:45

## 2019-05-19 RX ADMIN — Medication 50 MILLIGRAM(S): at 00:18

## 2019-05-19 NOTE — CONSULT NOTE ADULT - PROBLEM SELECTOR RECOMMENDATION 2
prednisone 40 per day to continue for 5 days then will taper as able  Advair 1 bid, spiriva 1 q day, qvar 2 bid, singulair 10  albuterol via HHN q 6

## 2019-05-19 NOTE — ED ADULT NURSE NOTE - OBJECTIVE STATEMENT
30 y/o F presents to the ED c/o SOB.  Pt states she is currently 29 weeks pregnant, 2nd pregnancy.  Pt has hx HTN, asthma.  PT reports worsening SOB today with minimal improvement from home medications.  PT presents with wheezes b/l.  No other complaints.  Patient is A&Ox4. Face is symmetrical. PERRL 3mmB. Speech is clear. Patient is moving all extremities with 5/5 strength and walks with steady gait. No chest pain,  palpitations, left arm pain, excessive fatigue, or nausea/vomiting.  No abdominal pain, no vaginal discharge.  No fevers/chills.  PT states her asthma meds make her hypertensive. Alert and oriented to person, place and time

## 2019-05-19 NOTE — CONSULT NOTE ADULT - ATTENDING COMMENTS
as above--  SOB due to severe persistent asthma exacerbation due to allergies---no need for O2  Asthma-prednisone 40mg per day to continue for 5 days then will taper as able, advair/qvar/ spiriva/singulair, albuterol via HHN q 4-6 now  Allergies-clarinex/flonase 1 sniff bid  Gerd-H2 blocker-diet  Pregnancy-as per Ob.  DC planning--no pulm contras to DC on meds as above-has f/up this week  Zac Kelley MD-Pulmonary   [phone]

## 2019-05-19 NOTE — DISCHARGE NOTE OB - PATIENT PORTAL LINK FT
You can access the [facility] Patient Portal, offered by [facility], by registering with the following website: [email or web address]

## 2019-05-19 NOTE — DISCHARGE NOTE ANTEPARTUM - CARE PROVIDER_API CALL
Dianne Patterson)  Obstetrics and Gynecology  [address], [address]  [place] [address]  Phone: [phone]  Fax: [phone]  Follow Up Time:

## 2019-05-19 NOTE — H&P ADULT - PROBLEM SELECTOR PLAN 1
NEURO: pt denies pain/neuro sx  CV:  VS q4h; continue home procardia XL; HELLP Labs wnl  PULM: Pulm consult (Dr. Zac Kelley, [phone]; pulm fellow Blair paged at [phone]), continue home asthma meds, continuous pulse ox, RVP, CXR (pt refused in ED)  GI: regular ADA diet; SLIV  : voiding spontaneously  ENDO: continue home metformin; FS fasting/1h postprandial; ISS  ID: afebrile; no leukocystosis; f/u RVP; UA w/ large Leuk esterase, pt asx, f/u UCx sent in ED  ANTE: NST and BPP; official ATU sono  DISPO: admit to Antepartum    d/w Dr. Leoanrdo Díaz MD PGY2 NEURO: pt denies pain/neuro sx  CV:  VS q4h; continue home procardia XL; HELLP Labs wnl  PULM: Pulm consult (Dr. Zac Kelley, [phone]; pulm fellow Blair paged at [phone]), continue home asthma meds, continuous pulse ox, RVP, CXR (pt refused in ED)  GI: regular ADA diet; SLIV  : voiding spontaneously  ENDO: continue home metformin; FS fasting/1h postprandial; ISS  ID: afebrile; no leukocystosis; f/u RVP; UA w/ large Leuk esterase, pt asx, f/u UCx sent in ED  ANTE: NST and BPP; GBS; official ATU sono  DISPO: admit to Antepartum    d/w Dr. Leonardo Díaz MD PGY2 NEURO: pt denies pain/neuro sx  CV:  VS q4h; continue home procardia XL; HELLP Labs wnl  PULM: Pulm consult (Dr. Zac Kelley, [phone]; pulm fellow Blair paged at [phone]), continue home asthma meds, continuous pulse ox, RVP, CXR (pt refused in ED), 2L NC  GI: regular ADA diet; SLIV  : voiding spontaneously  ENDO: continue home metformin; FS fasting/1h postprandial; ISS  ID: afebrile; no leukocystosis; f/u RVP; UA w/ large Leuk esterase, pt asx, f/u UCx sent in ED  ANTE: NST and BPP; GBS; official ATU sono  DISPO: admit to Antepartum    d/w Dr. Leonardo Díaz MD PGY2

## 2019-05-19 NOTE — DISCHARGE NOTE OB - CARE PROVIDER_API CALL
Marcin Duff)  Obstetrics and Gynecology  [address]  [place] [address]  Phone: [phone]  Fax: [phone]  Follow Up Time:

## 2019-05-19 NOTE — DISCHARGE NOTE ANTEPARTUM - PATIENT PORTAL LINK FT
You can access the [facility] Patient Portal, offered by [facility], by registering with the following website: [email or web address]

## 2019-05-19 NOTE — H&P ADULT - ASSESSMENT
33y  at 29w4d (WILLIS 19) presents to [facility] ED with asthma exacerbation x3d.   PNC complicated by severe persistent asthma, CHTN, T2DM, short cervix.

## 2019-05-19 NOTE — DISCHARGE NOTE OB - MEDICATION SUMMARY - MEDICATIONS TO TAKE
I will START or STAY ON the medications listed below when I get home from the hospital:    predniSONE 20 mg oral tablet  -- 2 tab(s) by mouth once a day   -- It is very important that you take or use this exactly as directed.  Do not skip doses or discontinue unless directed by your doctor.  Obtain medical advice before taking any non-prescription drugs as some may affect the action of this medication.  Take with food or milk.    -- Indication: For Asthma    aspirin 81 mg oral tablet, chewable  -- 1 tab(s) by mouth once a day  -- Indication: For CHTN    metFORMIN 500 mg oral tablet  -- 1 tab(s) by mouth 2 times a day  -- Indication: For DM    loratadine 10 mg oral tablet  -- 1 tab(s) by mouth once a day  -- Indication: For Asthma    desloratadine 5 mg oral tablet  -- 1 tab(s) by mouth once a day  -- Indication: For Asthma    Spiriva Respimat 28 ACT 2.5 mcg/inh inhalation aerosol  -- 2 puff(s) inhaled once a day  -- Indication: For Asthma    Advair Diskus 500 mcg-50 mcg inhalation powder  -- 1 puff(s) inhaled 2 times a day  -- Indication: For Asthma    Ventolin HFA 90 mcg/inh inhalation aerosol  -- 1 puff(s) inhaled 4 times a day  -- Indication: For Asthma    ipratropium-albuterol 0.5 mg-2.5 mg/3 mLinhalation solution  -- 3 milliliter(s) inhaled 4 times a day  -- Indication: For Asthma    albuterol 90 mcg/inh inhalation aerosol  -- 1 puff(s) inhaled once  -- Indication: For Asthma    NIFEdipine 90 mg oral tablet, extended release  -- 1 tab(s) by mouth once a day  -- Indication: For Asthma    Prenatal Multivitamins with Folic Acid 1 mg oral tablet  -- 1 tab(s) by mouth once a day  -- Indication: For postpartum    Singulair 10 mg oral tablet  -- 1 tab(s) by mouth once a day  -- Indication: For Asthma    pantoprazole 40 mg oral delayed release tablet  -- 1 tab(s) by mouth once a day  -- Indication: For heartburn    Qvar 80 mcg/inh inhalation aerosol  -- 1 puff(s) inhaled 2 times a day  -- Indication: For Asthma    progesterone 200 mg vaginal suppository  -- 1 suppository(ies) vaginally once a day  -- Indication: For shortened cervix

## 2019-05-19 NOTE — H&P ADULT - NSHPLABSRESULTS_GEN_ALL_CORE
LABS:                        12.8   10.5  )-----------( 323      ( 19 May 2019 00:19 )             36.5   baso 1.0    eos 14.1   imm gran x      lymph 21.2   mono 6.9    poly 56.8       05-19    139  |  106  |  5<L>  ----------------------------<  104<H>  3.8   |  21<L>  |  0.59    Ca    9.0      19 May 2019 00:19    TPro  6.9  /  Alb  3.5  /  TBili  0.2  /  DBili  x   /  AST  16  /  ALT  18  /  AlkPhos  106  05-19    Fibrinogen Assay (05.19.19 @ 03:15)    Fibrinogen Assay: 623  Activated Partial Thromboplastin Time in AM (05.19.19 @ 03:15)    Activated Partial Thromboplastin Time: 25.1  Prothrombin Time and INR, Plasma in AM (05.19.19 @ 03:15)    Prothrombin Time, Plasma: 10.9    INR: 0.9    Urinalysis (05.19.19 @ 04:00)    Glucose Qualitative, Urine: Negative    Blood, Urine: Negative    pH Urine: 6.5    Color: Light Yellow    Urine Appearance: Slightly Turbid    Bilirubin: Negative    Ketone - Urine: Moderate    Specific Gravity: 1.011    Protein, Urine: Trace    Urobilinogen: Negative    Nitrite: Negative    Leukocyte Esterase Concentration: Large    Urine Microscopic-Add On (NC) (05.19.19 @ 04:00)    Red Blood Cell - Urine: 8 /hpf    White Blood Cell - Urine: 83 /HPF    Hyaline Casts: 3 /lpf    Bacteria: Moderate    Epithelial Cells: 12 /hpf

## 2019-05-19 NOTE — DISCHARGE NOTE ANTEPARTUM - MEDICATION SUMMARY - MEDICATIONS TO TAKE
I will START or STAY ON the medications listed below when I get home from the hospital:    predniSONE 20 mg oral tablet  -- 2 tab(s) by mouth once a day   -- It is very important that you take or use this exactly as directed.  Do not skip doses or discontinue unless directed by your doctor.  Obtain medical advice before taking any non-prescription drugs as some may affect the action of this medication.  Take with food or milk.    -- Indication: For home    aspirin 81 mg oral tablet, chewable  -- 1 tab(s) by mouth once a day  -- Indication: For home    metFORMIN 500 mg oral tablet  -- 1 tab(s) by mouth 2 times a day  -- Indication: For home    desloratadine 5 mg oral tablet  -- 1 tab(s) by mouth once a day  -- Indication: For home    albuterol 90 mcg/inh inhalation aerosol  -- 1 puff(s) inhaled once  -- Indication: For home    Ventolin HFA 90 mcg/inh inhalation aerosol  -- 1 puff(s) inhaled 4 times a day  -- Indication: For home    Spiriva Respimat 28 ACT 2.5 mcg/inh inhalation aerosol  -- 2 puff(s) inhaled once a day  -- Indication: For home    Advair Diskus 500 mcg-50 mcg inhalation powder  -- 1 puff(s) inhaled 2 times a day  -- Indication: For home    ipratropium-albuterol 0.5 mg-2.5 mg/3 mLinhalation solution  -- 3 milliliter(s) inhaled 4 times a day  -- Indication: For home    NIFEdipine 90 mg oral tablet, extended release  -- 1 tab(s) by mouth once a day  -- Indication: For home    Prenatal Multivitamins with Folic Acid 1 mg oral tablet  -- 1 tab(s) by mouth once a day  -- Indication: For home    Singulair 10 mg oral tablet  -- 1 tab(s) by mouth once a day  -- Indication: For home    pantoprazole 40 mg oral delayed release tablet  -- 1 tab(s) by mouth once a day  -- Indication: For home    Qvar 80 mcg/inh inhalation aerosol  -- 1 puff(s) inhaled 2 times a day  -- Indication: For hoem    progesterone 200 mg vaginal suppository  -- 1 suppository(ies) vaginally once a day  -- Indication: For home

## 2019-05-19 NOTE — DISCHARGE NOTE OB - PLAN OF CARE
Asthma control continue asthma meds, f/u with Pulmonary this week, f/u with Endocrinology/Diabetes this week  F/u with Dr. Duff/OB this week

## 2019-05-19 NOTE — DISCHARGE NOTE ANTEPARTUM - HOSPITAL COURSE
Admitted with asthma exacerbation, seen by pulm attending. Discharged on prednisone. Fetal status reassuring

## 2019-05-19 NOTE — H&P ADULT - HISTORY OF PRESENT ILLNESS
33y  at 29w4d (WILLIS 19) presents to [facility] ED with asthma exacerbation x3d.  In ED oxygen saturation was 92% on RA despite nebs tx and prednisone.  Pt was recently admitted (3/2019) with asthma exacerbation.  She denies VB/LOF/CTX; reports good FM.  PNC complicated by severe persistent asthma, CHTN, T2DM, short cervix.  Pt denies fever, chills, nausea, vomiting, diarrhea, headache, constipation, dizziness, syncope, chest pain, palpitations, dysuria, urgency, frequency, abdominal/pelvic pain, vaginal bleeding/discharge/odor/pain/itching.    OBH:   c/b sPEC, asthma  GYNH: denies hx of fibroids, ov cysts, abnl paps, sti  PMSH: severe persistent asthma, allergic eosinophilia, CHTN, T2DM  MEDS: advair, singulair, spiriva, Qvar, clarinex, albuterol prn, procardia xl 90mg qAM, metformin 500mg tid, omeprazole, ASA 81, PNV, vaginal progesterone  ALL: nkda  SOCH: denies t/e/d; safe at home  ROS: negative except per hpi

## 2019-05-19 NOTE — DISCHARGE NOTE ANTEPARTUM - CARE PLAN
Principal Discharge DX:	Moderate persistent asthma with exacerbation  Goal:	asthma relief  Assessment and plan of treatment:	take meds as prescribed, follow up with pulm next week

## 2019-05-19 NOTE — CONSULT NOTE ADULT - ASSESSMENT
Assessment  33F with difficult to control allergic asthma now with two exacerbations in the last 2 months in setting of pregnancy, not on her immunotherapy for IL-5 due to pregnancy.   On maximal inhaled therapy and anti-allergy meds (montelukast, loratadine).    Improved with bronchodilators and one dose of prednisone    Recs    - Continue prednisone at 40mg per day  - continue duonebs Q6h standing and albuterol Q2h PRN   - Should resume home spiriva, symbicort on discharge.  - Continue montelukast, loratadine (or equivalent on formulary)    Is improved from presentation. Discussed with Dr Kelley who will see this morning. Assessment  33F with difficult to control allergic asthma now with two exacerbations in the last 2 months in setting of pregnancy, not on her immunotherapy for IL-5 due to pregnancy.   On maximal inhaled therapy and anti-allergy meds (montelukast, loratadine).    Improved with bronchodilators and one dose of prednisone    Recs    - Continue prednisone at 40mg per day  - continue duonebs Q6h standing and albuterol Q2h PRN   - Should resume home spiriva, advair/qvar on discharge.  - Continue montelukast, loratadine (or equivalent on formulary)    Is improved from presentation. Discussed with Dr Kelley who will see this morning.

## 2019-05-19 NOTE — DISCHARGE NOTE ANTEPARTUM - RUPTURE MEMBRANE (GUSH OR SLOW LEAKAGE OF CLEAR OR GREENISH FLUID)
After Visit Summary   2018    Dimple Oviedo    MRN: [id number]           Patient Information     Date Of Birth          1934        Visit Information        Provider Department      2018 1:00 PM Ashley Correa APRN [facility]        Today's Diagnoses     Urothelial carcinoma (H)    -  1      Care Instructions    Preparing for Your Surgery      Name:  Dimple Oviedo   MRN:  [id number]   :  1934   Today's Date:  2018     Arriving for surgery:   Surgery date:  2018  Arrival time:  6:00 am  Please come to:       [facility] [address]  [address] [place]  [address]    -   parking is available in front of the hospital from 5:15 am to 8:00 pm    -  Stop at the Information Desk in the lobby    -   Inform the information person that you are here for surgery. An escort to 3c will be provided. If you would not like an escort, please proceed to 3C on the 3rd floor. [phone]     What can I eat or drink?  -  You may have solid food or milk products until 8 hours prior to your surgery. (midnight)  -  You may have water, apple juice or 7up/Sprite until 2 hours prior to your surgery.(until 6 am arrival time)    Which medicines can I take?        Stop Aspirin, vitamins and supplements (fish oil) one week prior to surgery.      Hold Ibuprofen and Naproxen for 24 hours prior to surgery.     -  Do NOT take these medications in the morning, the day of surgery:     Iron      Furosemide      Irbesartan (avapro)           -  Please take these medications the day of surgery:     Omeprazole      Propranolol     Sertraline  (Zoloft)     Methimazole (tapazole)      How do I prepare myself?  -  Take two showers: one the night before surgery; and one the morning of surgery.         Use Scrubcare or Hibiclens to wash from neck down.  You may use your own shampoo and conditioner. No other hair products.   -   Do NOT use lotion, powder, deodorant, or antiperspirant the day of your surgery.  -  Do NOT wear any makeup, fingernail polish or jewelry.  -  Begin using Incentive Spirometer 1 week prior to surgery.  Use 4 times per day, up  to 5-10 breaths each time.  Bring Incentive Spirometer to hospital.  - Do not bring your own medications to the hospital, except for inhalers and eye   drops.  -  Bring your ID and insurance card.    Questions or Concerns:  -If you have questions or concerns regarding the day of surgery, please call [phone].       -For questions after surgery please call your surgeons office.           AFTER YOUR SURGERY  Breathing exercises   Breathing exercises help you recover faster. Take deep breaths and let the air out slowly. This will:     Help you wake up after surgery.    Help prevent complications like pneumonia.  Preventing complications will help you go home sooner.   We may give you a breathing device (incentive spirometer) to encourage you to breathe deeply.   Nausea and vomiting   You may feel sick to your stomach after surgery; if so, let your nurse know.    Pain control:  After surgery, you may have pain. Our goal is to help you manage your pain. Pain medicine will help you feel comfortable enough to do activities that will help you heal.  These activities may include breathing exercises, walking and physical therapy.   To help your health care team treat your pain we will ask: 1) If you have pain  2) where it is located 3) describe your pain in your words  Methods of pain control include medications given by mouth, vein or by nerve block for some surgeries.  We may give you a pain control pump that will:  1) Deliver the medicine through a tube placed in your vein  2) Control the amount of medicine you receive  3) Allow you to push a button to deliver a dose of pain medicine  Sequential Compression Device (SCD) or Pneumo Boots:  You may need to wear SCD S on your legs or feet. These are  wraps connected to a machine that pumps in air and releases it. The repeated pumping helps prevent blood clots from forming.           Follow-ups after your visit        Your next 10 appointments already scheduled     Nov 13, 2018   Procedure with Stuart King MD   [facility], [place], Same Day Surgery (--)    [address]  [place] [address] [phone]            Nov 26, 2018  1:30 PM CST   US THYROID with UCUS2   [facility] US ([facility])    [address] [place] [address] [phone]           How do I prepare for my exam? (Food and drink instructions) No Food and Drink Restrictions.  How do I prepare for my exam? (Other instructions) You do not need to do anything special to prepare for your exam.  What should I wear: Wear comfortable clothes.  How long does the exam take: Most ultrasounds take 30 to 60 minutes.  What should I bring: Bring a list of your medicines, including vitamins, minerals and over-the-counter drugs. It is safest to leave personal items at home.  Do I need a :  No  is needed.  What do I need to tell my doctor: Tell your doctor about any allergies you may have.  What should I do after the exam: No restrictions, You may resume normal activities.  What is this test: An ultrasound uses sound waves to make pictures of the body. Sound waves do not cause pain. The only discomfort may be the pressure of the wand against your skin or full bladder.  Who should I call with questions: If you have any questions, please call the Imaging Department where you will have your exam. Directions, parking instructions, and other information is available on our website, [email or web address].            Nov 26, 2018  2:15 PM CST   LAB with  LAB   [facility])    [address] [place] [id number] [phone]           Please do not eat 10-12 hours before  your appointment if you are coming in fasting for labs on lipids, cholesterol, or glucose (sugar). This does not apply to pregnant women. Water, hot tea and black coffee (with nothing added) are okay. Do not drink other fluids, diet soda or chew gum.            Nov 27, 2018 11:30 AM CST   (Arrive by 11:15 AM)   Post-Op with ALYSSA Mejia   [facility] ([facility])    [address] [place] [address]  [address] [facility] [address]   [phone]            Dec 04, 2018  1:30 PM CST   (Arrive by 1:15 PM)   RETURN ENDOCRINE with Dhara Meza MD   [facility] ([facility])    [address] [place] [address]   [phone]            Dec 06, 2018 11:00 AM CST   (Arrive by 10:45 AM)   Post-Op with Stuart King MD   [facility] ([facility])    [address] [place] [address]   [phone]              Future tests that were ordered for you today     Open Future Orders        Priority Expected Expires Ordered    Basic metabolic panel Routine 11/1/2018 12/1/2018 11/1/2018    CBC with platelets Routine 11/1/2018 12/1/2018 11/1/2018            Who to contact     Please call your clinic at [phone] to:    Ask questions about your health    Make or cancel appointments    Discuss your medicines    Learn about your test results    Speak to your doctor            Additional Information About Your Visit        [facility] Information     [employer] gives you secure access to your electronic health record. If you see a primary care provider, you can also send messages to your care team and make appointments. If you have questions, please call your primary care clinic.  If you do not have a primary care provider, please call [phone] and they will assist you.      [facility] is an  "electronic gateway that provides easy, online access to your medical records. With [facility], you can request a clinic appointment, read your test results, renew a prescription or communicate with your care team.     To access your existing account, please contact your [facility] or call [phone] for assistance.        Care EveryWhere ID     This is your Care EveryWhere ID. This could be used by other organizations to access your [place] medical records  [id number]        Your Vitals Were     Pulse Temperature Height Pulse Oximetry BMI (Body Mass Index)       60 97.9  F (36.6  C) (Oral) 1.448 m (4' 9\") 97% 30.45 kg/m2        Blood Pressure from Last 3 Encounters:   11/01/18 151/52   10/22/18 120/70   10/13/18 136/46    Weight from Last 3 Encounters:   11/01/18 63.8 kg (140 lb 11.2 oz)   10/22/18 64.9 kg (143 lb)   10/13/18 64.9 kg (143 lb 1.6 oz)                 Today's Medication Changes          These changes are accurate as of 11/1/18  1:17 PM.  If you have any questions, ask your nurse or doctor.               These medicines have changed or have updated prescriptions.        Dose/Directions    alendronate 70 MG tablet   Commonly known as:  FOSAMAX   This may have changed:  See the new instructions.   Used for:  High risk medication use, Osteopenia        TAKE 1 TABLET EVERY 7 DAYS AT LEAST 60 MINUTES BEFORE BREAKFAST AS DIRECTED. SEE PACKAGE FOR ADDITIONAL INSTRUCTIONS   Quantity:  12 tablet   Refills:  0       irbesartan 300 MG tablet   Commonly known as:  AVAPRO   This may have changed:    - how much to take  - how to take this  - when to take this  - additional instructions   Used for:  Essential hypertension with goal blood pressure less than 140/90        TAKE 1 TABLET EVERY DAY   Quantity:  90 tablet   Refills:  2       omeprazole 20 MG CR capsule   Commonly known as:  priLOSEC   This may have changed:  when to take this   Used for:  Gastroesophageal reflux disease " without esophagitis        Dose:  20 mg   Take 1 capsule (20 mg) by mouth daily   Quantity:  180 capsule   Refills:  3       propranolol 60 MG 24 hr capsule   Commonly known as:  INDERAL LA   This may have changed:  when to take this   Used for:  Nontoxic multinodular goiter        Dose:  60 mg   Take 1 capsule (60 mg) by mouth daily   Quantity:  90 capsule   Refills:  1                Primary Care Provider Office Phone # Fax #    Pop Antonino Zapien [phone] [address] [place], [place] [id number]        Equal Access to Services     [facility]: Hadii shameka chacon hadtamiko Sogabo, waaxda luqadaha, qaybta kaalmacristian chávez, maldonado seay . So [facility] [phone].    ATENCIÓN: Si habla español, tiene a sierra disposición servicios gratuitos de asistencia lingüística. [facility] [phone].    We comply with applicable federal civil rights laws and [place] laws. We do not discriminate on the basis of race, color, national origin, age, disability, sex, sexual orientation, or gender identity.            Thank you!     Thank you for choosing [facility]  for your care. Our goal is always to provide you with excellent care. Hearing back from our patients is one way we can continue to improve our services. Please take a few minutes to complete the written survey that you may receive in the mail after your visit with us. Thank you!             Your Updated Medication List - Protect others around you: Learn how to safely use, store and throw away your medicines at www.disposemymeds.org.          This list is accurate as of 11/1/18  1:17 PM.  Always use your most recent med list.                   Brand Name Dispense Instructions for use Diagnosis    acetaminophen 650 MG CR tablet    TYLENOL     Take 650 mg by mouth as needed        alendronate 70 MG tablet    FOSAMAX    12 tablet    TAKE 1 TABLET EVERY 7 DAYS AT LEAST 60 MINUTES BEFORE BREAKFAST  AS DIRECTED. SEE PACKAGE FOR ADDITIONAL INSTRUCTIONS    High risk medication use, Osteopenia       aspirin 81 MG tablet      Take 81 mg by mouth every evening        CALCIUM + D PO      Take 1 tablet by mouth 2 times daily        colestipol 1 g tablet    COLESTID     Take 1 g by mouth 2 times daily TAKE OTHER MEDS 1 HOUR BEFORE OR 4 HOURS AFTER COLESID        cycloSPORINE 0.05 % ophthalmic emulsion    RESTASIS     Place 1 drop into both eyes 2 times daily        ferrous sulfate 325 (65 Fe) MG Tbec EC tablet      Take 325 mg by mouth daily        Fish Oil 500 MG Caps      Take 1 capsule by mouth 2 times daily        furosemide 20 MG tablet    LASIX    90 tablet    Take 1 tablet (20 mg) by mouth every morning    Stasis edema of both lower extremities, (HFpEF) heart failure with preserved ejection fraction (H)       HYDROcodone-acetaminophen 5-325 MG per tablet    NORCO    10 tablet    Take 1 tablet by mouth every 6 hours as needed for severe pain        irbesartan 300 MG tablet    AVAPRO    90 tablet    TAKE 1 TABLET EVERY DAY    Essential hypertension with goal blood pressure less than 140/90       lovastatin 40 MG tablet    MEVACOR    90 tablet    TAKE 1 TABLET AT BEDTIME (HYPERLIPIDEMIA LDL GOAL BELOW 130)    Hyperlipidemia LDL goal <130       Melatonin 10 MG Tabs tablet      Take 10 mg by mouth as needed for sleep        methimazole 5 MG tablet    TAPAZOLE    180 tablet    Take 1 tablet (5 mg) by mouth 2 times daily    Nontoxic multinodular goiter       nitroGLYcerin 0.4 MG sublingual tablet    NITROSTAT    25 tablet    For chest pain place 1 tablet under the tongue every 5 minutes for 3 doses. If symptoms persist 5 minutes after 1st dose call 911.    Elevated troponin       omeprazole 20 MG CR capsule    priLOSEC    180 capsule    Take 1 capsule (20 mg) by mouth daily    Gastroesophageal reflux disease without esophagitis       order for DME     1 each    Equipment being ordered: Knee high compression for B LE  20-30mmHg, Velcro units for night time (consider hybrid sock as foot swelling is less than leg swelling), Donning device    Lymphedema of both lower extremities       PROBIOTIC ADVANCED PO      Take 1 capsule by mouth every morning        propranolol 60 MG 24 hr capsule    INDERAL LA    90 capsule    Take 1 capsule (60 mg) by mouth daily    Nontoxic multinodular goiter       rOPINIRole 0.25 MG tablet    REQUIP    180 tablet    Take 1 tablet (0.25 mg) by mouth 2 times daily as needed (restless leg syndrome)    Restless legs syndrome       sertraline 100 MG tablet    ZOLOFT    90 tablet    Take 1 tablet (100 mg) by mouth every morning    THERON (generalized anxiety disorder)       traZODone 50 MG tablet    DESYREL    45 tablet    TAKE 1/2 TABLET(25 MG)  AT BEDTIME    Insomnia, unspecified type          Statement Selected

## 2019-05-19 NOTE — DISCHARGE NOTE OB - CARE PLAN
Principal Discharge DX:	Severe asthma, unspecified whether complicated, unspecified whether persistent  Goal:	Asthma control  Assessment and plan of treatment:	continue asthma meds, f/u with Pulmonary this week, f/u with Endocrinology/Diabetes this week  F/u with Dr. Duff/OB this week

## 2019-05-19 NOTE — H&P ADULT - NSHPPHYSICALEXAM_GEN_ALL_CORE
Vital Signs Last 24 Hrs  T(C): 36.6 (18 May 2019 23:57), Max: 36.6 (18 May 2019 23:57)  T(F): 97.8 (18 May 2019 23:57), Max: 97.8 (18 May 2019 23:57)  HR: 92 (18 May 2019 23:57) (92 - 95)  BP: 155/105 (18 May 2019 23:57) (149/109 - 155/105)  RR: 18 (18 May 2019 23:57) (18 - 18)  SpO2: 93% (18 May 2019 23:57) (93% - 94%)  Height (cm): 157.48 (05-18-19 @ 23:11)  Weight (kg): 69.9 (05-18-19 @ 23:11)  BMI (kg/m2): 28.2 (05-18-19 @ 23:11)  BSA (m2): 1.71 (05-18-19 @ 23:11)    PHYSICAL EXAM:  GEN: NAD, A&Ox3  CV: RRR, no m/g/r  LUNGS: expiratory rhonchi of b/l upper lung fields posteriorly; lower fields clear b/l  ABD: soft, NT, ND, +BS  PELVIC: deferred  EXT: WWP, no edema/TTP

## 2019-05-19 NOTE — DISCHARGE NOTE OB - HOSPITAL COURSE
34 y/o P1 at 29 wks 4 days, admitted for asthma exacerbation. CHTN and Type2 DM. Patient received oxygen, nebulizer, steroid tabper. Pulmonary service consulte and Dr Kelley who follows the patient. patient's condition improved and was cleared for discharge and advised to f/u with pulmonary this week.

## 2019-05-19 NOTE — CONSULT NOTE ADULT - SUBJECTIVE AND OBJECTIVE BOX
CHIEF COMPLAINT:  shortness of breath, cough    HPI:    33F with history of allergic asthma (elevated IgE and eosinophils) who is 29 weeks pregnant. Presenting now with 1 day of severe cough and shortness of breath on top of 2 weeks of increasing asthma symptoms. She says she has had these typical symptoms mildlt over the past 2 weeks - they initially improved after increased neb treatments at home.   She has had worsening allergic symptoms of congestion she attributes to pollen and the season's change.   She had previously better controlled asthma when she was taking Nucala however she had to stop when she became pregnant.   She did not have such bad symptoms during her first pregnancy.     She was admitted in March of this year for similar but more severe symptoms of asthma. 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  Recent Travel: No    Allergies    No Known Allergies    Intolerances        HOME MEDICATIONS:  Home Medications:  Advair Diskus 500 mcg-50 mcg inhalation powder: 1 puff(s) inhaled 2 times a day (19 May 2019 04:07)  aspirin 81 mg oral tablet, chewable: 1 tab(s) orally once a day (22 Mar 2019 11:17)  desloratadine 5 mg oral tablet: 1 tab(s) orally once a day (19 May 2019 04:04)  ipratropium-albuterol 0.5 mg-2.5 mg/3 mLinhalation solution: 3 milliliter(s) inhaled 4 times a day (19 May 2019 04:11)  metFORMIN 500 mg oral tablet: 1 tab(s) orally 2 times a day (22 Mar 2019 11:23)  NIFEdipine 90 mg oral tablet, extended release: 1 tab(s) orally once a day (22 Mar 2019 11:17)  pantoprazole 40 mg oral delayed release tablet: 1 tab(s) orally once a day (19 May 2019 04:04)  Prenatal Multivitamins with Folic Acid 1 mg oral tablet: 1 tab(s) orally once a day (22 Mar 2019 11:17)  progesterone 200 mg vaginal suppository: 1 suppository(ies) vaginal once a day (22 Mar 2019 11:17)  Qvar 80 mcg/inh inhalation aerosol: 1 puff(s) inhaled 2 times a day (19 May 2019 04:08)  Singulair 10 mg oral tablet: 1 tab(s) orally once a day (19 May 2019 04:02)  Spiriva Respimat 28 ACT 2.5 mcg/inh inhalation aerosol: 2 puff(s) inhaled once a day (19 May 2019 04:05)  Ventolin HFA 90 mcg/inh inhalation aerosol: 1 puff(s) inhaled 4 times a day (19 May 2019 04:02)      REVIEW OF SYSTEMS:  Constitutional: [ X] negative [ ] fevers [ ] chills [ ] weight loss [ ] weight gain  HEENT: [ ] negative [ ] dry eyes [ ] eye irritation [ ] postnasal drip [+ ] nasal congestion  CV: [+ ] negative  [ ] chest pain [ ] orthopnea [ ] palpitations [ ] murmur  Resp: [ ] negative [+ ] cough [+ ] shortness of breath [+ ] dyspnea [- ] wheezing [- ] sputum [ ] hemoptysis  GI: [+ ] negative [ ] nausea [ ] vomiting [ ] diarrhea [ ] constipation [ ] abd pain [ ] dysphagia   : [+ ] negative [ ] dysuria [ ] nocturia [ ] hematuria [ ] increased urinary frequency  Musculoskeletal: [+ ] negative [ ] back pain [ ] myalgias [ ] arthralgias [ ] fracture  Skin: [ +] negative [ ] rash [ ] itch  Neurological: [+ ] negative [ ] headache [ ] dizziness [ ] syncope [ ] weakness [ ] numbness  Psychiatric: [ ] negative [ ] anxiety [ ] depression  Endocrine: [ ] negative [ ] diabetes [ ] thyroid problem  Hematologic/Lymphatic: [ ] negative [ ] anemia [ ] bleeding problem  Allergic/Immunologic: [ ] negative [ ] itchy eyes [ ] nasal discharge [ ] hives [ ] angioedema  [+ ] All other systems negative  [ ] Unable to assess ROS because ________    OBJECTIVE:  ICU Vital Signs Last 24 Hrs  T(C): 36.7 (19 May 2019 03:58), Max: 36.7 (19 May 2019 03:58)  T(F): 98 (19 May 2019 03:58), Max: 98 (19 May 2019 03:58)  HR: 86 (19 May 2019 03:58) (86 - 95)  BP: 152/99 (19 May 2019 03:58) (149/109 - 155/105)  RR: 18 (19 May 2019 03:58) (18 - 18)  SpO2: 95% (19 May 2019 03:58) (93% - 95%)        CAPILLARY BLOOD GLUCOSE    PHYSICAL EXAM:  General: well appearing, sitting in bed  can lay flat without issue  Respiratory: normal effort on 2L NC. SpO2 96%  good air entry in all fields  Diffuse rhonchi and wheezes with wheezing predominantly at the apices  Cardiovascular: rate regular, normal s1 and s2  Abdomen: soft, gravid uterus  Extremities: thin, no edema  Skin:  no rashes  Neurological: awake, attentive, no focal deficits      ULTRASOUND EXAM    Bilateral anterior and posterior A line aeration pattern with a very small area of consolidation at the left PLAPS point without dynamic air-bronchograms    Interpretation  Normal aeration pattern with small area of atelectasis at the left base      HOSPITAL MEDICATIONS:  Standing Meds:  ALBUTerol/ipratropium for Nebulization. 3 milliLiter(s) Nebulizer every 6 hours  aspirin  chewable 81 milliGRAM(s) Oral daily  dextrose 5%. 1000 milliLiter(s) IV Continuous <Continuous>  dextrose 50% Injectable 12.5 Gram(s) IV Push once  dextrose 50% Injectable 25 Gram(s) IV Push once  dextrose 50% Injectable 25 Gram(s) IV Push once  insulin lispro (HumaLOG) corrective regimen sliding scale   SubCutaneous three times a day before meals  loratadine 10 milliGRAM(s) Oral daily  metFORMIN 500 milliGRAM(s) Oral two times a day  montelukast 10 milliGRAM(s) Oral daily  NIFEdipine XL 90 milliGRAM(s) Oral daily  pantoprazole    Tablet 40 milliGRAM(s) Oral before breakfast  prenatal multivitamin 1 Tablet(s) Oral daily  progesterone Vaginal Insert 200 milliGRAM(s) Vaginal at bedtime      PRN Meds:  dextrose 40% Gel 15 Gram(s) Oral once PRN  glucagon  Injectable 1 milliGRAM(s) IntraMuscular once PRN      LABS:                        12.8   10.5  )-----------( 323      ( 19 May 2019 00:19 )             36.5     Hgb Trend: 12.8<--  05-19    139  |  106  |  5<L>  ----------------------------<  104<H>  3.8   |  21<L>  |  0.59    Ca    9.0      19 May 2019 00:19    TPro  6.9  /  Alb  3.5  /  TBili  0.2  /  DBili  x   /  AST  16  /  ALT  18  /  AlkPhos  106      Creatinine Trend: 0.59<--  PT/INR - ( 19 May 2019 03:15 )   PT: 10.9 sec;   INR: 0.95 ratio         PTT - ( 19 May 2019 03:15 )  PTT:25.1 sec  Urinalysis Basic - ( 19 May 2019 04:00 )    Color: Light Yellow / Appearance: Slightly Turbid / S.011 / pH: x  Gluc: x / Ketone: Moderate  / Bili: Negative / Urobili: Negative   Blood: x / Protein: Trace / Nitrite: Negative   Leuk Esterase: Large / RBC: 8 /hpf / WBC 83 /HPF   Sq Epi: x / Non Sq Epi: 12 /hpf / Bacteria: Moderate            MICROBIOLOGY:       RADIOLOGY:  [ ] Reviewed and interpreted by me    PULMONARY FUNCTION TESTS:    EKG:

## 2019-05-20 LAB
CULTURE RESULTS: NO GROWTH — SIGNIFICANT CHANGE UP
GROUP B BETA STREP DNA (PCR): SIGNIFICANT CHANGE UP
GROUP B BETA STREP INTERPRETATION: SIGNIFICANT CHANGE UP
SOURCE GROUP B STREP: SIGNIFICANT CHANGE UP
SPECIMEN SOURCE: SIGNIFICANT CHANGE UP

## 2019-05-21 ENCOUNTER — APPOINTMENT (OUTPATIENT)
Dept: PULMONOLOGY | Facility: CLINIC | Age: 34
End: 2019-05-21
Payer: COMMERCIAL

## 2019-05-21 VITALS
RESPIRATION RATE: 17 BRPM | BODY MASS INDEX: 28.34 KG/M2 | WEIGHT: 154 LBS | DIASTOLIC BLOOD PRESSURE: 80 MMHG | SYSTOLIC BLOOD PRESSURE: 120 MMHG | HEART RATE: 109 BPM | OXYGEN SATURATION: 98 % | HEIGHT: 62 IN

## 2019-05-21 PROCEDURE — 99496 TRANSJ CARE MGMT HIGH F2F 7D: CPT

## 2019-05-21 NOTE — REVIEW OF SYSTEMS
[As Noted in HPI] : as noted in HPI [Chest Discomfort] : no chest discomfort [Palpitations] : no palpitations [Edema] : ~T edema was not present [Leg Cramps] : leg cramps [Heartburn] : no heartburn [Reflux] : no reflux [Indigestion] : no indigestion [Dysphagia] : no dysphagia [Nausea] : nausea [Vomiting] : no vomiting [Constipation] : no constipation [Diarrhea] : no diarrhea [Abdominal Pain] : no abdominal pain [Negative] : Sleep Disorder

## 2019-05-21 NOTE — REASON FOR VISIT
[Follow-Up - From Hospitalization] : a hospitalization follow-up [Asthma] : asthma [FreeTextEntry1] :  allergic eosinophilia, allergic rhinitis, elevated IgE, prophylactic measure, severe persistent asthma and vitamin D deficiency

## 2019-05-21 NOTE — HISTORY OF PRESENT ILLNESS
[FreeTextEntry1] : Ms. Blue is a 33 year old female with a history of allergic eosinophilia, allergic rhinitis, elevated IgE, prophylactic measure, severe persistent asthma and vitamin D deficiency presenting today for a follow up visit following a recent hospitalization for an asthmatic attack. She is currently 30 weeks pregnant.\par \par She states that she was hospitalized on 5/18/2019 for an asthmatic attack.\par Pt states back on may 9th pt started to wheeze- she was directed to use nebulizer. She felt it did help her. The following week the patient started to wheeze again, used her nebs and felt relief. On Saturday however, she had significant wheezing and her chest felt tight. Given her hx, she reported to the ER. She was given neb tx, steroids and pulm consulted.  She felt much better. She was sent home on 40 mg of prednisone. \par \par At this point she feels 100% better. She is not feeling sick, has no sick symptoms. \par She is unsure if anything triggered this. \par She denies cough, SOB, wheezing, sore throat, sinus issues, or reflux. \par She is now sleeping well without issue. She notes some muscle cramping in her calves. She feels she is drinking enough water. \par Her appetite is normal however she continues to deal with nausea. She no longer has vomiting. She states that stopped at about 20 weeks but had one episode a couple weeks ago. She reports she has not gained any weight during this pregnancy but the baby is doing well. \par \par She is compliant on her inhalers. \par She reports her Due date is 7/31/19 however the plan is to be induced at 38 weeks.\par - She states that she is feeling much better and more like herself.\par - She states that she has been losing weight overall during her pregnancy. \par - She states that her breathing has improved following her hospitalization. \par - She states that her reflux is well controlled with medications. \par - She states that today is her last day of Prednisone 40 mg, and will take 30 mg tomorrow. \par - She denies any headaches, nausea, vomiting, fever, chills, sweats, chest pain, chest pressure, diarrhea, constipation, dysphagia, dizziness, leg swelling, leg pain, itchy eyes, itchy ears, sour taste in the mouth, cough, SOB, wheeze, sinus congestion, rhinitis, joint aches or pains, muscle aches or pains.

## 2019-05-21 NOTE — PHYSICAL EXAM
[Normal Conjunctiva] : the conjunctiva exhibited no abnormalities [Eyelids - No Xanthelasma] : the eyelids demonstrated no xanthelasmas [Normal Oropharynx] : normal oropharynx [II] : II [Neck Appearance] : the appearance of the neck was normal [Neck Cervical Mass (___cm)] : no neck mass was observed [Jugular Venous Distention Increased] : there was no jugular-venous distention [Thyroid Diffuse Enlargement] : the thyroid was not enlarged [Thyroid Nodule] : there were no palpable thyroid nodules [Heart Rate And Rhythm] : heart rate and rhythm were normal [Heart Sounds] : normal S1 and S2 [Murmurs] : no murmurs present [Respiration, Rhythm And Depth] : normal respiratory rhythm and effort [Exaggerated Use Of Accessory Muscles For Inspiration] : no accessory muscle use [Auscultation Breath Sounds / Voice Sounds] : lungs were clear to auscultation bilaterally [FreeTextEntry1] : I:E ratio 1:3; clear [Abdomen Soft] : soft [Abdomen Tenderness] : non-tender [Abdomen Mass (___ Cm)] : no abdominal mass palpated [Abnormal Walk] : normal gait [Gait - Sufficient For Exercise Testing] : the gait was sufficient for exercise testing [Nail Clubbing] : no clubbing of the fingernails [Cyanosis, Localized] : no localized cyanosis [Petechial Hemorrhages (___cm)] : no petechial hemorrhages [Skin Color & Pigmentation] : normal skin color and pigmentation [] : no rash [No Venous Stasis] : no venous stasis [Skin Lesions] : no skin lesions [No Skin Ulcers] : no skin ulcer [No Xanthoma] : no  xanthoma was observed [Deep Tendon Reflexes (DTR)] : deep tendon reflexes were 2+ and symmetric [Sensation] : the sensory exam was normal to light touch and pinprick [No Focal Deficits] : no focal deficits [Oriented To Time, Place, And Person] : oriented to person, place, and time [Impaired Insight] : insight and judgment were intact [Affect] : the affect was normal [General Appearance - Well Developed] : well developed [Normal Appearance] : normal appearance [Well Groomed] : well groomed [General Appearance - Well Nourished] : well nourished [No Deformities] : no deformities [General Appearance - In No Acute Distress] : no acute distress

## 2019-05-21 NOTE — ASSESSMENT
[FreeTextEntry1] : Ms. Blue is a 32 year old female with a history of asthma / GERD / Allergy / overweight. Her asthma is active and is both IgE mediated and eosinophil mediated (currently off on Nucala), and 30 weeks pregnant - s/p flair\par \par problem 1: severe persistent asthma (flair)\par -prednisone taper - 30 mg x2days , 20 mg x 3-4 days, 10 mg x 3- 4 days\par -pt to call on tuesday with update\par -Information sheet given to the patient to be reviewed, this medication is never to be used without consulting the prescribing physician. Proper dietary restraint is necessary specifically salt containing foods, if any reaction may occur should be reported.  \par -Continue albuterol by the nebulizer upto QID- if shaky- can take 1/2 vial \par -continue to use Ventolin PRN and before exercise\par -continue to use Advair 500 at 1 inhalation BID rinse and gargle after use\par -Continue QVar 80 at 2 inhalations BID rinse and gargle after use\par -Continue Spiriva 2 inhalations QD rinse and gargle after use\par -continue to use Singulair 10 mg before bed. \par \par problem 2: Allergic Asthma (Elevated IgE)\par -her elevated IgE makes her a candidate for Xolair\par \par Problem 3; Eosinophilic Asthma\par -Nucala on hold due to Pregnancy\par \par problem 4: allergic rhinitis-stable\par -continue to use clarinex QHS\par -d/c nasal sprays\par -Environmental measures for allergies were encouraged including mattress and pillow cover, air purifier, and environmental controls.\par \par problem 5: low vitamin d\par -continue to use supplemental vitamin D\par -Has been associated with asthma exacerbations and increased allergic symptoms. The goal based on recent information is maintaining levels between 50-70 and low normal is 30. Recommended 50,000 units every two weeks to once a month depending on the level. \par \par problem 6: GERD-stable/controlled\par -Protonix 40 mg QAM \par -off zantac at this point\par -Rule of 2s: avoid eating too much, eating too late, eating too spicy, eating two hours before bed\par -Things to avoid including overeating, spicy foods, tight clothing, eating within three hours of bed, this list is not all inclusive. \par \par problem 7: leg cramps\par -hydrate thoroughly \par -ensure enough potassium and magnesium through diet\par \par Patient may follow up in 6 weeks with Dr. Kelley\par pt to call next week with update\par pt to call office w any issues\par She is encouraged to call with any changes, concerns, or questions

## 2019-05-23 ENCOUNTER — APPOINTMENT (OUTPATIENT)
Dept: MATERNAL FETAL MEDICINE | Facility: CLINIC | Age: 34
End: 2019-05-23
Payer: COMMERCIAL

## 2019-05-23 ENCOUNTER — ASOB RESULT (OUTPATIENT)
Age: 34
End: 2019-05-23

## 2019-05-23 PROCEDURE — G0108 DIAB MANAGE TRN  PER INDIV: CPT

## 2019-05-24 RX ORDER — PEN NEEDLE, DIABETIC 29 G X1/2"
32G X 4 MM NEEDLE, DISPOSABLE MISCELLANEOUS
Qty: 1 | Refills: 1 | Status: DISCONTINUED | COMMUNITY
Start: 2019-05-23 | End: 2019-05-24

## 2019-05-24 RX ORDER — INSULIN HUMAN 100 [IU]/ML
100 INJECTION, SUSPENSION SUBCUTANEOUS
Qty: 1 | Refills: 1 | Status: DISCONTINUED | COMMUNITY
Start: 2019-05-23 | End: 2019-05-24

## 2019-05-28 ENCOUNTER — MESSAGE (OUTPATIENT)
Age: 34
End: 2019-05-28

## 2019-05-29 ENCOUNTER — APPOINTMENT (OUTPATIENT)
Dept: MATERNAL FETAL MEDICINE | Facility: CLINIC | Age: 34
End: 2019-05-29
Payer: COMMERCIAL

## 2019-05-29 ENCOUNTER — APPOINTMENT (OUTPATIENT)
Dept: ANTEPARTUM | Facility: CLINIC | Age: 34
End: 2019-05-29
Payer: COMMERCIAL

## 2019-05-29 ENCOUNTER — ASOB RESULT (OUTPATIENT)
Age: 34
End: 2019-05-29

## 2019-05-29 VITALS
DIASTOLIC BLOOD PRESSURE: 88 MMHG | SYSTOLIC BLOOD PRESSURE: 112 MMHG | HEART RATE: 98 BPM | WEIGHT: 155 LBS | HEIGHT: 62 IN | BODY MASS INDEX: 28.52 KG/M2 | OXYGEN SATURATION: 98 %

## 2019-05-29 PROCEDURE — 99213 OFFICE O/P EST LOW 20 MIN: CPT

## 2019-05-29 PROCEDURE — 76816 OB US FOLLOW-UP PER FETUS: CPT

## 2019-05-29 PROCEDURE — G0108 DIAB MANAGE TRN  PER INDIV: CPT

## 2019-06-06 ENCOUNTER — APPOINTMENT (OUTPATIENT)
Dept: MATERNAL FETAL MEDICINE | Facility: CLINIC | Age: 34
End: 2019-06-06
Payer: COMMERCIAL

## 2019-06-06 ENCOUNTER — ASOB RESULT (OUTPATIENT)
Age: 34
End: 2019-06-06

## 2019-06-06 ENCOUNTER — APPOINTMENT (OUTPATIENT)
Dept: ANTEPARTUM | Facility: CLINIC | Age: 34
End: 2019-06-06
Payer: COMMERCIAL

## 2019-06-06 ENCOUNTER — NON-APPOINTMENT (OUTPATIENT)
Age: 34
End: 2019-06-06

## 2019-06-06 ENCOUNTER — APPOINTMENT (OUTPATIENT)
Dept: CARDIOLOGY | Facility: CLINIC | Age: 34
End: 2019-06-06
Payer: COMMERCIAL

## 2019-06-06 VITALS
BODY MASS INDEX: 28.52 KG/M2 | SYSTOLIC BLOOD PRESSURE: 127 MMHG | HEART RATE: 122 BPM | WEIGHT: 155 LBS | DIASTOLIC BLOOD PRESSURE: 89 MMHG | HEIGHT: 62 IN | OXYGEN SATURATION: 96 %

## 2019-06-06 PROCEDURE — 76818 FETAL BIOPHYS PROFILE W/NST: CPT

## 2019-06-06 PROCEDURE — 99214 OFFICE O/P EST MOD 30 MIN: CPT

## 2019-06-06 PROCEDURE — G0108 DIAB MANAGE TRN  PER INDIV: CPT

## 2019-06-06 PROCEDURE — 93000 ELECTROCARDIOGRAM COMPLETE: CPT

## 2019-06-06 NOTE — HISTORY OF PRESENT ILLNESS
[FreeTextEntry1] : Patient is a 33 year old female with a history of gestational HTN, preeclampsia,  allergic eosinophilia, allergic rhinitis, elevated IgE, prophylactic measure, severe persistent asthma and vitamin D deficiency presenting today to establish care in setting of recent hospitalization for an asthmatic attack. She is currently 32 weeks pregnant. She has no complaints at this time. \par \par - 1st pregnancy - gestational HTN - was on procardia with subsequent preeclampsia - at 38 weeks vaginal delivery 2014. Post the delivery stayed on meds - was changed to Benicar and then switched back to procardia when became pregnant\par - She was hospitalized on 3/20/2019 for an asthmatic attack following mold exposure at work. She is feeling much better and more like herself. She has been losing weight overall during her pregnancy. \par As per the patient, her breathing has improved following her hospitalization. SHe was on a prednisone taper post the hospitalization\par

## 2019-06-06 NOTE — DISCUSSION/SUMMARY
[FreeTextEntry1] : Patient is a 33 year old female with a history of gestational HTN, preeclampsia,  allergic eosinophilia, allergic rhinitis, elevated IgE, prophylactic measure, severe persistent asthma and vitamin D deficiency presenting today to establish care in setting of recent hospitalization for an asthmatic attack. She is currently 32 weeks pregnant. She has no complaints at this time. \par \par - 1st pregnancy - gestational HTN - was on procardia with subsequent preeclampsia - at 38 weeks vaginal delivery 2014. Post the delivery stayed on meds - was changed to Benicar and then switched back to procardia when became pregnant\par - on procardia with BPs well controlled at home,e\par - ECG with no acute changes\par - echo done 3/2019 - with normal systolic and diastolic heart failure

## 2019-06-06 NOTE — PHYSICAL EXAM
[General Appearance - Well Developed] : well developed [Normal Appearance] : normal appearance [Well Groomed] : well groomed [General Appearance - Well Nourished] : well nourished [General Appearance - In No Acute Distress] : no acute distress [No Deformities] : no deformities [Normal Conjunctiva] : the conjunctiva exhibited no abnormalities [Normal Oral Mucosa] : normal oral mucosa [Eyelids - No Xanthelasma] : the eyelids demonstrated no xanthelasmas [No Oral Cyanosis] : no oral cyanosis [No Oral Pallor] : no oral pallor [Normal Jugular Venous A Waves Present] : normal jugular venous A waves present [Normal Jugular Venous V Waves Present] : normal jugular venous V waves present [No Jugular Venous Roberts A Waves] : no jugular venous roberts A waves [Respiration, Rhythm And Depth] : normal respiratory rhythm and effort [Exaggerated Use Of Accessory Muscles For Inspiration] : no accessory muscle use [Heart Rate And Rhythm] : heart rate and rhythm were normal [Heart Sounds] : normal S1 and S2 [Auscultation Breath Sounds / Voice Sounds] : lungs were clear to auscultation bilaterally [Murmurs] : no murmurs present [Abdomen Soft] : soft [Abdomen Tenderness] : non-tender [Abnormal Walk] : normal gait [Abdomen Mass (___ Cm)] : no abdominal mass palpated [Gait - Sufficient For Exercise Testing] : the gait was sufficient for exercise testing [Cyanosis, Localized] : no localized cyanosis [Petechial Hemorrhages (___cm)] : no petechial hemorrhages [Nail Clubbing] : no clubbing of the fingernails [Skin Color & Pigmentation] : normal skin color and pigmentation [] : no rash [No Venous Stasis] : no venous stasis [No Skin Ulcers] : no skin ulcer [Skin Lesions] : no skin lesions [No Xanthoma] : no  xanthoma was observed [Oriented To Time, Place, And Person] : oriented to person, place, and time [Affect] : the affect was normal [Mood] : the mood was normal [No Anxiety] : not feeling anxious

## 2019-06-10 ENCOUNTER — NON-APPOINTMENT (OUTPATIENT)
Age: 34
End: 2019-06-10

## 2019-06-10 ENCOUNTER — APPOINTMENT (OUTPATIENT)
Dept: ANTEPARTUM | Facility: CLINIC | Age: 34
End: 2019-06-10
Payer: COMMERCIAL

## 2019-06-10 ENCOUNTER — APPOINTMENT (OUTPATIENT)
Dept: OBGYN | Facility: CLINIC | Age: 34
End: 2019-06-10
Payer: COMMERCIAL

## 2019-06-10 ENCOUNTER — ASOB RESULT (OUTPATIENT)
Age: 34
End: 2019-06-10

## 2019-06-10 VITALS
BODY MASS INDEX: 28.89 KG/M2 | HEIGHT: 62 IN | SYSTOLIC BLOOD PRESSURE: 132 MMHG | WEIGHT: 157 LBS | DIASTOLIC BLOOD PRESSURE: 78 MMHG

## 2019-06-10 PROCEDURE — 59025 FETAL NON-STRESS TEST: CPT

## 2019-06-10 PROCEDURE — 0502F SUBSEQUENT PRENATAL CARE: CPT

## 2019-06-10 PROCEDURE — 76818 FETAL BIOPHYS PROFILE W/NST: CPT

## 2019-06-13 ENCOUNTER — ASOB RESULT (OUTPATIENT)
Age: 34
End: 2019-06-13

## 2019-06-13 ENCOUNTER — APPOINTMENT (OUTPATIENT)
Dept: ANTEPARTUM | Facility: CLINIC | Age: 34
End: 2019-06-13
Payer: COMMERCIAL

## 2019-06-13 ENCOUNTER — RX RENEWAL (OUTPATIENT)
Age: 34
End: 2019-06-13

## 2019-06-13 PROCEDURE — 76818 FETAL BIOPHYS PROFILE W/NST: CPT

## 2019-06-17 ENCOUNTER — ASOB RESULT (OUTPATIENT)
Age: 34
End: 2019-06-17

## 2019-06-17 ENCOUNTER — APPOINTMENT (OUTPATIENT)
Dept: ANTEPARTUM | Facility: CLINIC | Age: 34
End: 2019-06-17
Payer: COMMERCIAL

## 2019-06-17 PROCEDURE — 59025 FETAL NON-STRESS TEST: CPT

## 2019-06-18 ENCOUNTER — OTHER (OUTPATIENT)
Age: 34
End: 2019-06-18

## 2019-06-20 ENCOUNTER — APPOINTMENT (OUTPATIENT)
Dept: ANTEPARTUM | Facility: CLINIC | Age: 34
End: 2019-06-20

## 2019-06-20 ENCOUNTER — APPOINTMENT (OUTPATIENT)
Dept: MATERNAL FETAL MEDICINE | Facility: CLINIC | Age: 34
End: 2019-06-20

## 2019-06-21 ENCOUNTER — APPOINTMENT (OUTPATIENT)
Dept: OBGYN | Facility: CLINIC | Age: 34
End: 2019-06-21
Payer: COMMERCIAL

## 2019-06-21 ENCOUNTER — NON-APPOINTMENT (OUTPATIENT)
Age: 34
End: 2019-06-21

## 2019-06-21 VITALS
DIASTOLIC BLOOD PRESSURE: 70 MMHG | SYSTOLIC BLOOD PRESSURE: 134 MMHG | HEIGHT: 62 IN | BODY MASS INDEX: 29.08 KG/M2 | WEIGHT: 158 LBS

## 2019-06-21 PROCEDURE — 0502F SUBSEQUENT PRENATAL CARE: CPT

## 2019-06-21 PROCEDURE — 99080 SPECIAL REPORTS OR FORMS: CPT

## 2019-06-24 ENCOUNTER — ASOB RESULT (OUTPATIENT)
Age: 34
End: 2019-06-24

## 2019-06-24 ENCOUNTER — APPOINTMENT (OUTPATIENT)
Dept: ANTEPARTUM | Facility: CLINIC | Age: 34
End: 2019-06-24
Payer: COMMERCIAL

## 2019-06-24 ENCOUNTER — APPOINTMENT (OUTPATIENT)
Dept: MATERNAL FETAL MEDICINE | Facility: CLINIC | Age: 34
End: 2019-06-24
Payer: COMMERCIAL

## 2019-06-24 ENCOUNTER — APPOINTMENT (OUTPATIENT)
Dept: ANTEPARTUM | Facility: CLINIC | Age: 34
End: 2019-06-24

## 2019-06-24 PROCEDURE — G0108 DIAB MANAGE TRN  PER INDIV: CPT

## 2019-06-24 PROCEDURE — 76818 FETAL BIOPHYS PROFILE W/NST: CPT

## 2019-06-26 ENCOUNTER — OUTPATIENT (OUTPATIENT)
Dept: OUTPATIENT SERVICES | Facility: HOSPITAL | Age: 34
LOS: 1 days | End: 2019-06-26
Payer: COMMERCIAL

## 2019-06-26 ENCOUNTER — APPOINTMENT (OUTPATIENT)
Dept: ANTEPARTUM | Facility: CLINIC | Age: 34
End: 2019-06-26
Payer: COMMERCIAL

## 2019-06-26 ENCOUNTER — APPOINTMENT (OUTPATIENT)
Dept: MATERNAL FETAL MEDICINE | Facility: CLINIC | Age: 34
End: 2019-06-26

## 2019-06-26 ENCOUNTER — APPOINTMENT (OUTPATIENT)
Dept: PULMONOLOGY | Facility: CLINIC | Age: 34
End: 2019-06-26

## 2019-06-26 ENCOUNTER — ASOB RESULT (OUTPATIENT)
Age: 34
End: 2019-06-26

## 2019-06-26 VITALS
HEART RATE: 117 BPM | BODY MASS INDEX: 29.28 KG/M2 | SYSTOLIC BLOOD PRESSURE: 148 MMHG | OXYGEN SATURATION: 95 % | DIASTOLIC BLOOD PRESSURE: 100 MMHG | HEIGHT: 62 IN | WEIGHT: 159.13 LBS

## 2019-06-26 DIAGNOSIS — O26.899 OTHER SPECIFIED PREGNANCY RELATED CONDITIONS, UNSPECIFIED TRIMESTER: ICD-10-CM

## 2019-06-26 DIAGNOSIS — Z3A.00 WEEKS OF GESTATION OF PREGNANCY NOT SPECIFIED: ICD-10-CM

## 2019-06-26 LAB
ALBUMIN SERPL ELPH-MCNC: 3.8 G/DL — SIGNIFICANT CHANGE UP (ref 3.3–5)
ALP SERPL-CCNC: 177 U/L — HIGH (ref 40–120)
ALT FLD-CCNC: 16 U/L — SIGNIFICANT CHANGE UP (ref 10–45)
ANION GAP SERPL CALC-SCNC: 19 MMOL/L — HIGH (ref 5–17)
APPEARANCE UR: ABNORMAL
APTT BLD: 24.8 SEC — LOW (ref 27.5–36.3)
AST SERPL-CCNC: 16 U/L — SIGNIFICANT CHANGE UP (ref 10–40)
BASOPHILS # BLD AUTO: 0.1 K/UL — SIGNIFICANT CHANGE UP (ref 0–0.2)
BASOPHILS NFR BLD AUTO: 0.6 % — SIGNIFICANT CHANGE UP (ref 0–2)
BILIRUB SERPL-MCNC: 0.4 MG/DL — SIGNIFICANT CHANGE UP (ref 0.2–1.2)
BILIRUB UR-MCNC: NEGATIVE — SIGNIFICANT CHANGE UP
BUN SERPL-MCNC: 5 MG/DL — LOW (ref 7–23)
CALCIUM SERPL-MCNC: 9.6 MG/DL — SIGNIFICANT CHANGE UP (ref 8.4–10.5)
CHLORIDE SERPL-SCNC: 102 MMOL/L — SIGNIFICANT CHANGE UP (ref 96–108)
CO2 SERPL-SCNC: 18 MMOL/L — LOW (ref 22–31)
COLOR SPEC: SIGNIFICANT CHANGE UP
CREAT ?TM UR-MCNC: 42 MG/DL — SIGNIFICANT CHANGE UP
CREAT SERPL-MCNC: 0.57 MG/DL — SIGNIFICANT CHANGE UP (ref 0.5–1.3)
DIFF PNL FLD: NEGATIVE — SIGNIFICANT CHANGE UP
EOSINOPHIL # BLD AUTO: 0.5 K/UL — SIGNIFICANT CHANGE UP (ref 0–0.5)
EOSINOPHIL NFR BLD AUTO: 4.8 % — SIGNIFICANT CHANGE UP (ref 0–6)
FIBRINOGEN PPP-MCNC: 848 MG/DL — HIGH (ref 350–510)
GLUCOSE SERPL-MCNC: 64 MG/DL — LOW (ref 70–99)
GLUCOSE UR QL: NEGATIVE — SIGNIFICANT CHANGE UP
HCT VFR BLD CALC: 41 % — SIGNIFICANT CHANGE UP (ref 34.5–45)
HGB BLD-MCNC: 14.2 G/DL — SIGNIFICANT CHANGE UP (ref 11.5–15.5)
INR BLD: 0.95 RATIO — SIGNIFICANT CHANGE UP (ref 0.88–1.16)
KETONES UR-MCNC: NEGATIVE — SIGNIFICANT CHANGE UP
LDH SERPL L TO P-CCNC: 153 U/L — SIGNIFICANT CHANGE UP (ref 50–242)
LEUKOCYTE ESTERASE UR-ACNC: ABNORMAL
LYMPHOCYTES # BLD AUTO: 2 K/UL — SIGNIFICANT CHANGE UP (ref 1–3.3)
LYMPHOCYTES # BLD AUTO: 20.6 % — SIGNIFICANT CHANGE UP (ref 13–44)
MCHC RBC-ENTMCNC: 30.2 PG — SIGNIFICANT CHANGE UP (ref 27–34)
MCHC RBC-ENTMCNC: 34.6 GM/DL — SIGNIFICANT CHANGE UP (ref 32–36)
MCV RBC AUTO: 87.2 FL — SIGNIFICANT CHANGE UP (ref 80–100)
MONOCYTES # BLD AUTO: 0.6 K/UL — SIGNIFICANT CHANGE UP (ref 0–0.9)
MONOCYTES NFR BLD AUTO: 6.2 % — SIGNIFICANT CHANGE UP (ref 2–14)
NEUTROPHILS # BLD AUTO: 6.6 K/UL — SIGNIFICANT CHANGE UP (ref 1.8–7.4)
NEUTROPHILS NFR BLD AUTO: 67.8 % — SIGNIFICANT CHANGE UP (ref 43–77)
NITRITE UR-MCNC: NEGATIVE — SIGNIFICANT CHANGE UP
PH UR: 6.5 — SIGNIFICANT CHANGE UP (ref 5–8)
PLATELET # BLD AUTO: 349 K/UL — SIGNIFICANT CHANGE UP (ref 150–400)
POTASSIUM SERPL-MCNC: 3.9 MMOL/L — SIGNIFICANT CHANGE UP (ref 3.5–5.3)
POTASSIUM SERPL-SCNC: 3.9 MMOL/L — SIGNIFICANT CHANGE UP (ref 3.5–5.3)
PROT ?TM UR-MCNC: 16 MG/DL — HIGH (ref 0–12)
PROT SERPL-MCNC: 7.1 G/DL — SIGNIFICANT CHANGE UP (ref 6–8.3)
PROT UR-MCNC: NEGATIVE — SIGNIFICANT CHANGE UP
PROT/CREAT UR-RTO: 0.4 RATIO — HIGH (ref 0–0.2)
PROTHROM AB SERPL-ACNC: 10.8 SEC — SIGNIFICANT CHANGE UP (ref 10–12.9)
RBC # BLD: 4.71 M/UL — SIGNIFICANT CHANGE UP (ref 3.8–5.2)
RBC # FLD: 13.4 % — SIGNIFICANT CHANGE UP (ref 10.3–14.5)
SODIUM SERPL-SCNC: 139 MMOL/L — SIGNIFICANT CHANGE UP (ref 135–145)
SP GR SPEC: 1.01 — LOW (ref 1.01–1.02)
URATE SERPL-MCNC: 4.4 MG/DL — SIGNIFICANT CHANGE UP (ref 2.5–7)
UROBILINOGEN FLD QL: NEGATIVE — SIGNIFICANT CHANGE UP
WBC # BLD: 9.7 K/UL — SIGNIFICANT CHANGE UP (ref 3.8–10.5)
WBC # FLD AUTO: 9.7 K/UL — SIGNIFICANT CHANGE UP (ref 3.8–10.5)

## 2019-06-26 PROCEDURE — 85610 PROTHROMBIN TIME: CPT

## 2019-06-26 PROCEDURE — 84550 ASSAY OF BLOOD/URIC ACID: CPT

## 2019-06-26 PROCEDURE — 76818 FETAL BIOPHYS PROFILE W/NST: CPT

## 2019-06-26 PROCEDURE — 85027 COMPLETE CBC AUTOMATED: CPT

## 2019-06-26 PROCEDURE — 84156 ASSAY OF PROTEIN URINE: CPT

## 2019-06-26 PROCEDURE — 85384 FIBRINOGEN ACTIVITY: CPT

## 2019-06-26 PROCEDURE — G0463: CPT

## 2019-06-26 PROCEDURE — 81001 URINALYSIS AUTO W/SCOPE: CPT

## 2019-06-26 PROCEDURE — 76816 OB US FOLLOW-UP PER FETUS: CPT

## 2019-06-26 PROCEDURE — 83615 LACTATE (LD) (LDH) ENZYME: CPT

## 2019-06-26 PROCEDURE — 80053 COMPREHEN METABOLIC PANEL: CPT

## 2019-06-26 PROCEDURE — 85730 THROMBOPLASTIN TIME PARTIAL: CPT

## 2019-06-26 PROCEDURE — 82570 ASSAY OF URINE CREATININE: CPT

## 2019-06-26 PROCEDURE — 59025 FETAL NON-STRESS TEST: CPT

## 2019-06-26 PROCEDURE — 59025 FETAL NON-STRESS TEST: CPT | Mod: 26

## 2019-06-26 RX ORDER — ALBUTEROL 90 UG/1
2 AEROSOL, METERED ORAL ONCE
Refills: 0 | Status: DISCONTINUED | OUTPATIENT
Start: 2019-06-26 | End: 2019-07-11

## 2019-06-28 ENCOUNTER — APPOINTMENT (OUTPATIENT)
Dept: OBGYN | Facility: CLINIC | Age: 34
End: 2019-06-28

## 2019-07-01 ENCOUNTER — CLINICAL ADVICE (OUTPATIENT)
Age: 34
End: 2019-07-01

## 2019-07-01 ENCOUNTER — OUTPATIENT (OUTPATIENT)
Dept: OUTPATIENT SERVICES | Facility: HOSPITAL | Age: 34
LOS: 1 days | End: 2019-07-01
Payer: COMMERCIAL

## 2019-07-01 ENCOUNTER — ASOB RESULT (OUTPATIENT)
Age: 34
End: 2019-07-01

## 2019-07-01 ENCOUNTER — APPOINTMENT (OUTPATIENT)
Dept: OBGYN | Facility: CLINIC | Age: 34
End: 2019-07-01

## 2019-07-01 ENCOUNTER — APPOINTMENT (OUTPATIENT)
Dept: ANTEPARTUM | Facility: CLINIC | Age: 34
End: 2019-07-01
Payer: COMMERCIAL

## 2019-07-01 DIAGNOSIS — Z3A.00 WEEKS OF GESTATION OF PREGNANCY NOT SPECIFIED: ICD-10-CM

## 2019-07-01 DIAGNOSIS — O26.899 OTHER SPECIFIED PREGNANCY RELATED CONDITIONS, UNSPECIFIED TRIMESTER: ICD-10-CM

## 2019-07-01 LAB
ALBUMIN SERPL ELPH-MCNC: 3.6 G/DL — SIGNIFICANT CHANGE UP (ref 3.3–5)
ALP SERPL-CCNC: 181 U/L — HIGH (ref 40–120)
ALT FLD-CCNC: 15 U/L — SIGNIFICANT CHANGE UP (ref 10–45)
ANION GAP SERPL CALC-SCNC: 15 MMOL/L — SIGNIFICANT CHANGE UP (ref 5–17)
APPEARANCE UR: ABNORMAL
APTT BLD: 26.2 SEC — LOW (ref 27.5–36.3)
AST SERPL-CCNC: 17 U/L — SIGNIFICANT CHANGE UP (ref 10–40)
BACTERIA # UR AUTO: ABNORMAL
BASOPHILS # BLD AUTO: 0.1 K/UL — SIGNIFICANT CHANGE UP (ref 0–0.2)
BASOPHILS NFR BLD AUTO: 1.1 % — SIGNIFICANT CHANGE UP (ref 0–2)
BILIRUB SERPL-MCNC: 0.4 MG/DL — SIGNIFICANT CHANGE UP (ref 0.2–1.2)
BILIRUB UR-MCNC: NEGATIVE — SIGNIFICANT CHANGE UP
BUN SERPL-MCNC: 8 MG/DL — SIGNIFICANT CHANGE UP (ref 7–23)
CALCIUM SERPL-MCNC: 9.4 MG/DL — SIGNIFICANT CHANGE UP (ref 8.4–10.5)
CHLORIDE SERPL-SCNC: 101 MMOL/L — SIGNIFICANT CHANGE UP (ref 96–108)
CO2 SERPL-SCNC: 20 MMOL/L — LOW (ref 22–31)
COLOR SPEC: SIGNIFICANT CHANGE UP
CREAT ?TM UR-MCNC: 26 MG/DL — SIGNIFICANT CHANGE UP
CREAT SERPL-MCNC: 0.68 MG/DL — SIGNIFICANT CHANGE UP (ref 0.5–1.3)
DIFF PNL FLD: NEGATIVE — SIGNIFICANT CHANGE UP
EOSINOPHIL # BLD AUTO: 0.4 K/UL — SIGNIFICANT CHANGE UP (ref 0–0.5)
EOSINOPHIL NFR BLD AUTO: 6.1 % — HIGH (ref 0–6)
EPI CELLS # UR: 5 /HPF — SIGNIFICANT CHANGE UP
FIBRINOGEN PPP-MCNC: 650 MG/DL — HIGH (ref 350–510)
GLUCOSE BLDC GLUCOMTR-MCNC: 82 MG/DL — SIGNIFICANT CHANGE UP (ref 70–99)
GLUCOSE SERPL-MCNC: 78 MG/DL — SIGNIFICANT CHANGE UP (ref 70–99)
GLUCOSE UR QL: NEGATIVE — SIGNIFICANT CHANGE UP
HCT VFR BLD CALC: 39.6 % — SIGNIFICANT CHANGE UP (ref 34.5–45)
HGB BLD-MCNC: 13.4 G/DL — SIGNIFICANT CHANGE UP (ref 11.5–15.5)
HYALINE CASTS # UR AUTO: 0 /LPF — SIGNIFICANT CHANGE UP (ref 0–2)
INR BLD: 0.9 RATIO — SIGNIFICANT CHANGE UP (ref 0.88–1.16)
KETONES UR-MCNC: NEGATIVE — SIGNIFICANT CHANGE UP
LDH SERPL L TO P-CCNC: 147 U/L — SIGNIFICANT CHANGE UP (ref 50–242)
LEUKOCYTE ESTERASE UR-ACNC: ABNORMAL
LYMPHOCYTES # BLD AUTO: 1.4 K/UL — SIGNIFICANT CHANGE UP (ref 1–3.3)
LYMPHOCYTES # BLD AUTO: 21.5 % — SIGNIFICANT CHANGE UP (ref 13–44)
MCHC RBC-ENTMCNC: 30.1 PG — SIGNIFICANT CHANGE UP (ref 27–34)
MCHC RBC-ENTMCNC: 33.9 GM/DL — SIGNIFICANT CHANGE UP (ref 32–36)
MCV RBC AUTO: 88.9 FL — SIGNIFICANT CHANGE UP (ref 80–100)
MONOCYTES # BLD AUTO: 0.4 K/UL — SIGNIFICANT CHANGE UP (ref 0–0.9)
MONOCYTES NFR BLD AUTO: 6.6 % — SIGNIFICANT CHANGE UP (ref 2–14)
NEUTROPHILS # BLD AUTO: 4.2 K/UL — SIGNIFICANT CHANGE UP (ref 1.8–7.4)
NEUTROPHILS NFR BLD AUTO: 64.7 % — SIGNIFICANT CHANGE UP (ref 43–77)
NITRITE UR-MCNC: NEGATIVE — SIGNIFICANT CHANGE UP
PH UR: 6 — SIGNIFICANT CHANGE UP (ref 5–8)
PLATELET # BLD AUTO: 316 K/UL — SIGNIFICANT CHANGE UP (ref 150–400)
POTASSIUM SERPL-MCNC: 4.5 MMOL/L — SIGNIFICANT CHANGE UP (ref 3.5–5.3)
POTASSIUM SERPL-SCNC: 4.5 MMOL/L — SIGNIFICANT CHANGE UP (ref 3.5–5.3)
PROT ?TM UR-MCNC: 4 MG/DL — SIGNIFICANT CHANGE UP (ref 0–12)
PROT SERPL-MCNC: 7.2 G/DL — SIGNIFICANT CHANGE UP (ref 6–8.3)
PROT UR-MCNC: NEGATIVE — SIGNIFICANT CHANGE UP
PROT/CREAT UR-RTO: 0.2 RATIO — SIGNIFICANT CHANGE UP (ref 0–0.2)
PROTHROM AB SERPL-ACNC: 10.3 SEC — SIGNIFICANT CHANGE UP (ref 10–12.9)
RBC # BLD: 4.46 M/UL — SIGNIFICANT CHANGE UP (ref 3.8–5.2)
RBC # FLD: 14 % — SIGNIFICANT CHANGE UP (ref 10.3–14.5)
RBC CASTS # UR COMP ASSIST: 3 /HPF — SIGNIFICANT CHANGE UP (ref 0–4)
SODIUM SERPL-SCNC: 136 MMOL/L — SIGNIFICANT CHANGE UP (ref 135–145)
SP GR SPEC: 1.01 — LOW (ref 1.01–1.02)
URATE SERPL-MCNC: 5.8 MG/DL — SIGNIFICANT CHANGE UP (ref 2.5–7)
UROBILINOGEN FLD QL: NEGATIVE — SIGNIFICANT CHANGE UP
WBC # BLD: 6.5 K/UL — SIGNIFICANT CHANGE UP (ref 3.8–10.5)
WBC # FLD AUTO: 6.5 K/UL — SIGNIFICANT CHANGE UP (ref 3.8–10.5)
WBC UR QL: 23 /HPF — HIGH (ref 0–5)

## 2019-07-01 PROCEDURE — G0463: CPT

## 2019-07-01 PROCEDURE — 85610 PROTHROMBIN TIME: CPT

## 2019-07-01 PROCEDURE — 84550 ASSAY OF BLOOD/URIC ACID: CPT

## 2019-07-01 PROCEDURE — 85384 FIBRINOGEN ACTIVITY: CPT

## 2019-07-01 PROCEDURE — 85730 THROMBOPLASTIN TIME PARTIAL: CPT

## 2019-07-01 PROCEDURE — 82962 GLUCOSE BLOOD TEST: CPT

## 2019-07-01 PROCEDURE — 59025 FETAL NON-STRESS TEST: CPT

## 2019-07-01 PROCEDURE — 80053 COMPREHEN METABOLIC PANEL: CPT

## 2019-07-01 PROCEDURE — 83615 LACTATE (LD) (LDH) ENZYME: CPT

## 2019-07-01 PROCEDURE — 84156 ASSAY OF PROTEIN URINE: CPT

## 2019-07-01 PROCEDURE — 81001 URINALYSIS AUTO W/SCOPE: CPT

## 2019-07-01 PROCEDURE — 85027 COMPLETE CBC AUTOMATED: CPT

## 2019-07-01 PROCEDURE — 82570 ASSAY OF URINE CREATININE: CPT

## 2019-07-02 ENCOUNTER — APPOINTMENT (OUTPATIENT)
Dept: OBGYN | Facility: CLINIC | Age: 34
End: 2019-07-02
Payer: COMMERCIAL

## 2019-07-02 ENCOUNTER — NON-APPOINTMENT (OUTPATIENT)
Age: 34
End: 2019-07-02

## 2019-07-02 VITALS — SYSTOLIC BLOOD PRESSURE: 142 MMHG | HEIGHT: 62 IN | DIASTOLIC BLOOD PRESSURE: 80 MMHG

## 2019-07-02 PROCEDURE — 0502F SUBSEQUENT PRENATAL CARE: CPT

## 2019-07-03 LAB
ALBUMIN SERPL ELPH-MCNC: 3.7 G/DL
ALP BLD-CCNC: 181 U/L
ALT SERPL-CCNC: 15 U/L
ANION GAP SERPL CALC-SCNC: 15 MMOL/L
APTT BLD: 27 SEC
AST SERPL-CCNC: 21 U/L
BASOPHILS # BLD AUTO: 0.05 K/UL
BASOPHILS NFR BLD AUTO: 0.7 %
BILIRUB SERPL-MCNC: 0.3 MG/DL
BUN SERPL-MCNC: 7 MG/DL
CALCIUM SERPL-MCNC: 9.6 MG/DL
CHLORIDE SERPL-SCNC: 104 MMOL/L
CO2 SERPL-SCNC: 19 MMOL/L
CREAT 24H UR-MCNC: 1 G/24 H
CREAT 24H UR-MCNC: 1 G/24 H
CREAT ?TM UR-MCNC: 94 MG/DL
CREAT ?TM UR-MCNC: 94 MG/DL
CREAT SERPL-MCNC: 0.75 MG/DL
EOSINOPHIL # BLD AUTO: 0.42 K/UL
EOSINOPHIL NFR BLD AUTO: 5.7 %
GLUCOSE SERPL-MCNC: 94 MG/DL
HCT VFR BLD CALC: 42.5 %
HGB BLD-MCNC: 13.1 G/DL
IMM GRANULOCYTES NFR BLD AUTO: 0.3 %
INR PPP: 0.88 RATIO
LYMPHOCYTES # BLD AUTO: 1.51 K/UL
LYMPHOCYTES NFR BLD AUTO: 20.4 %
MAN DIFF?: NORMAL
MCHC RBC-ENTMCNC: 28.6 PG
MCHC RBC-ENTMCNC: 30.8 GM/DL
MCV RBC AUTO: 92.8 FL
MONOCYTES # BLD AUTO: 0.57 K/UL
MONOCYTES NFR BLD AUTO: 7.7 %
NEUTROPHILS # BLD AUTO: 4.83 K/UL
NEUTROPHILS NFR BLD AUTO: 65.2 %
PLATELET # BLD AUTO: 350 K/UL
POTASSIUM SERPL-SCNC: 4.5 MMOL/L
PROT 24H UR-MRATE: 15 MG/DL
PROT ?TM UR-MCNC: 24 HR
PROT ?TM UR-MCNC: 24 HR
PROT SERPL-MCNC: 6.4 G/DL
PROT UR-MCNC: 158 MG/24 H
PT BLD: 10.1 SEC
RBC # BLD: 4.58 M/UL
RBC # FLD: 15.3 %
SODIUM SERPL-SCNC: 138 MMOL/L
SPECIMEN VOL 24H UR: 1050 ML
SPECIMEN VOL 24H UR: 1050 ML
WBC # FLD AUTO: 7.4 K/UL

## 2019-07-05 ENCOUNTER — APPOINTMENT (OUTPATIENT)
Dept: OBGYN | Facility: CLINIC | Age: 34
End: 2019-07-05

## 2019-07-05 ENCOUNTER — APPOINTMENT (OUTPATIENT)
Dept: ANTEPARTUM | Facility: CLINIC | Age: 34
End: 2019-07-05
Payer: COMMERCIAL

## 2019-07-05 ENCOUNTER — ASOB RESULT (OUTPATIENT)
Age: 34
End: 2019-07-05

## 2019-07-05 VITALS — SYSTOLIC BLOOD PRESSURE: 142 MMHG | DIASTOLIC BLOOD PRESSURE: 92 MMHG | BODY MASS INDEX: 29.08 KG/M2 | WEIGHT: 159 LBS

## 2019-07-05 VITALS — SYSTOLIC BLOOD PRESSURE: 150 MMHG | DIASTOLIC BLOOD PRESSURE: 100 MMHG | HEIGHT: 62 IN

## 2019-07-05 LAB
GP B STREP DNA SPEC QL NAA+PROBE: NORMAL
GP B STREP DNA SPEC QL NAA+PROBE: NOT DETECTED
SOURCE GBS: NORMAL

## 2019-07-05 PROCEDURE — 76818 FETAL BIOPHYS PROFILE W/NST: CPT

## 2019-07-08 ENCOUNTER — ASOB RESULT (OUTPATIENT)
Age: 34
End: 2019-07-08

## 2019-07-08 ENCOUNTER — APPOINTMENT (OUTPATIENT)
Dept: ANTEPARTUM | Facility: CLINIC | Age: 34
End: 2019-07-08
Payer: COMMERCIAL

## 2019-07-08 PROCEDURE — 59025 FETAL NON-STRESS TEST: CPT

## 2019-07-09 ENCOUNTER — APPOINTMENT (OUTPATIENT)
Dept: OBGYN | Facility: CLINIC | Age: 34
End: 2019-07-09

## 2019-07-09 ENCOUNTER — ASOB RESULT (OUTPATIENT)
Age: 34
End: 2019-07-09

## 2019-07-09 ENCOUNTER — APPOINTMENT (OUTPATIENT)
Dept: MATERNAL FETAL MEDICINE | Facility: CLINIC | Age: 34
End: 2019-07-09
Payer: COMMERCIAL

## 2019-07-09 VITALS — WEIGHT: 159 LBS | BODY MASS INDEX: 29.08 KG/M2

## 2019-07-09 PROCEDURE — G0108 DIAB MANAGE TRN  PER INDIV: CPT

## 2019-07-11 ENCOUNTER — APPOINTMENT (OUTPATIENT)
Dept: ANTEPARTUM | Facility: CLINIC | Age: 34
End: 2019-07-11
Payer: COMMERCIAL

## 2019-07-11 ENCOUNTER — INPATIENT (INPATIENT)
Facility: HOSPITAL | Age: 34
LOS: 2 days | Discharge: HOME | End: 2019-07-14
Attending: OBSTETRICS & GYNECOLOGY | Admitting: OBSTETRICS & GYNECOLOGY
Payer: COMMERCIAL

## 2019-07-11 ENCOUNTER — ASOB RESULT (OUTPATIENT)
Age: 34
End: 2019-07-11

## 2019-07-11 ENCOUNTER — APPOINTMENT (OUTPATIENT)
Dept: OBGYN | Facility: CLINIC | Age: 34
End: 2019-07-11
Payer: COMMERCIAL

## 2019-07-11 ENCOUNTER — NON-APPOINTMENT (OUTPATIENT)
Age: 34
End: 2019-07-11

## 2019-07-11 VITALS — HEIGHT: 62 IN | WEIGHT: 160.94 LBS

## 2019-07-11 VITALS — SYSTOLIC BLOOD PRESSURE: 130 MMHG | DIASTOLIC BLOOD PRESSURE: 85 MMHG

## 2019-07-11 VITALS
DIASTOLIC BLOOD PRESSURE: 92 MMHG | WEIGHT: 157 LBS | SYSTOLIC BLOOD PRESSURE: 150 MMHG | BODY MASS INDEX: 28.89 KG/M2 | HEIGHT: 62 IN

## 2019-07-11 DIAGNOSIS — O10.919 UNSPECIFIED PRE-EXISTING HYPERTENSION COMPLICATING PREGNANCY, UNSPECIFIED TRIMESTER: ICD-10-CM

## 2019-07-11 LAB
ALBUMIN SERPL ELPH-MCNC: 3.7 G/DL — SIGNIFICANT CHANGE UP (ref 3.3–5)
ALP SERPL-CCNC: 168 U/L — HIGH (ref 40–120)
ALT FLD-CCNC: 15 U/L — SIGNIFICANT CHANGE UP (ref 10–45)
ANION GAP SERPL CALC-SCNC: 12 MMOL/L — SIGNIFICANT CHANGE UP (ref 5–17)
APPEARANCE UR: ABNORMAL
APTT BLD: 23.8 SEC — LOW (ref 27.5–36.3)
AST SERPL-CCNC: 16 U/L — SIGNIFICANT CHANGE UP (ref 10–40)
BASOPHILS # BLD AUTO: 0.1 K/UL — SIGNIFICANT CHANGE UP (ref 0–0.2)
BASOPHILS NFR BLD AUTO: 0.7 % — SIGNIFICANT CHANGE UP (ref 0–2)
BILIRUB SERPL-MCNC: 0.3 MG/DL — SIGNIFICANT CHANGE UP (ref 0.2–1.2)
BILIRUB UR-MCNC: NEGATIVE — SIGNIFICANT CHANGE UP
BLD GP AB SCN SERPL QL: NEGATIVE — SIGNIFICANT CHANGE UP
BUN SERPL-MCNC: 10 MG/DL — SIGNIFICANT CHANGE UP (ref 7–23)
CALCIUM SERPL-MCNC: 9 MG/DL — SIGNIFICANT CHANGE UP (ref 8.4–10.5)
CHLORIDE SERPL-SCNC: 102 MMOL/L — SIGNIFICANT CHANGE UP (ref 96–108)
CO2 SERPL-SCNC: 20 MMOL/L — LOW (ref 22–31)
COLOR SPEC: YELLOW — SIGNIFICANT CHANGE UP
CREAT ?TM UR-MCNC: 252 MG/DL — SIGNIFICANT CHANGE UP
CREAT SERPL-MCNC: 0.8 MG/DL — SIGNIFICANT CHANGE UP (ref 0.5–1.3)
DIFF PNL FLD: NEGATIVE — SIGNIFICANT CHANGE UP
EOSINOPHIL # BLD AUTO: 0.6 K/UL — HIGH (ref 0–0.5)
EOSINOPHIL NFR BLD AUTO: 6.5 % — HIGH (ref 0–6)
FIBRINOGEN PPP-MCNC: 630 MG/DL — HIGH (ref 350–510)
GLUCOSE SERPL-MCNC: 72 MG/DL — SIGNIFICANT CHANGE UP (ref 70–99)
GLUCOSE UR QL: NEGATIVE — SIGNIFICANT CHANGE UP
HCT VFR BLD CALC: 37.3 % — SIGNIFICANT CHANGE UP (ref 34.5–45)
HGB BLD-MCNC: 13 G/DL — SIGNIFICANT CHANGE UP (ref 11.5–15.5)
INR BLD: 0.89 RATIO — SIGNIFICANT CHANGE UP (ref 0.88–1.16)
KETONES UR-MCNC: SIGNIFICANT CHANGE UP
LDH SERPL L TO P-CCNC: 146 U/L — SIGNIFICANT CHANGE UP (ref 50–242)
LEUKOCYTE ESTERASE UR-ACNC: ABNORMAL
LYMPHOCYTES # BLD AUTO: 2.4 K/UL — SIGNIFICANT CHANGE UP (ref 1–3.3)
LYMPHOCYTES # BLD AUTO: 25 % — SIGNIFICANT CHANGE UP (ref 13–44)
MCHC RBC-ENTMCNC: 30.9 PG — SIGNIFICANT CHANGE UP (ref 27–34)
MCHC RBC-ENTMCNC: 35 GM/DL — SIGNIFICANT CHANGE UP (ref 32–36)
MCV RBC AUTO: 88.3 FL — SIGNIFICANT CHANGE UP (ref 80–100)
MONOCYTES # BLD AUTO: 0.6 K/UL — SIGNIFICANT CHANGE UP (ref 0–0.9)
MONOCYTES NFR BLD AUTO: 6.7 % — SIGNIFICANT CHANGE UP (ref 2–14)
NEUTROPHILS # BLD AUTO: 5.8 K/UL — SIGNIFICANT CHANGE UP (ref 1.8–7.4)
NEUTROPHILS NFR BLD AUTO: 61.1 % — SIGNIFICANT CHANGE UP (ref 43–77)
NITRITE UR-MCNC: NEGATIVE — SIGNIFICANT CHANGE UP
PH UR: 6 — SIGNIFICANT CHANGE UP (ref 5–8)
PLATELET # BLD AUTO: 269 K/UL — SIGNIFICANT CHANGE UP (ref 150–400)
POTASSIUM SERPL-MCNC: 4.2 MMOL/L — SIGNIFICANT CHANGE UP (ref 3.5–5.3)
POTASSIUM SERPL-SCNC: 4.2 MMOL/L — SIGNIFICANT CHANGE UP (ref 3.5–5.3)
PROT ?TM UR-MCNC: 46 MG/DL — HIGH (ref 0–12)
PROT SERPL-MCNC: 6.8 G/DL — SIGNIFICANT CHANGE UP (ref 6–8.3)
PROT UR-MCNC: ABNORMAL
PROT/CREAT UR-RTO: 0.2 RATIO — SIGNIFICANT CHANGE UP (ref 0–0.2)
PROTHROM AB SERPL-ACNC: 10.1 SEC — SIGNIFICANT CHANGE UP (ref 10–12.9)
RBC # BLD: 4.23 M/UL — SIGNIFICANT CHANGE UP (ref 3.8–5.2)
RBC # FLD: 14.7 % — HIGH (ref 10.3–14.5)
RH IG SCN BLD-IMP: POSITIVE — SIGNIFICANT CHANGE UP
SODIUM SERPL-SCNC: 134 MMOL/L — LOW (ref 135–145)
SP GR SPEC: 1.03 — HIGH (ref 1.01–1.02)
URATE SERPL-MCNC: 5.8 MG/DL — SIGNIFICANT CHANGE UP (ref 2.5–7)
UROBILINOGEN FLD QL: ABNORMAL
WBC # BLD: 9.5 K/UL — SIGNIFICANT CHANGE UP (ref 3.8–10.5)
WBC # FLD AUTO: 9.5 K/UL — SIGNIFICANT CHANGE UP (ref 3.8–10.5)

## 2019-07-11 PROCEDURE — 76818 FETAL BIOPHYS PROFILE W/NST: CPT

## 2019-07-11 PROCEDURE — 0502F SUBSEQUENT PRENATAL CARE: CPT

## 2019-07-11 RX ORDER — SODIUM CHLORIDE 9 MG/ML
1000 INJECTION, SOLUTION INTRAVENOUS
Refills: 0 | Status: COMPLETED | OUTPATIENT
Start: 2019-07-11 | End: 2019-07-11

## 2019-07-11 RX ORDER — OXYTOCIN 10 UNIT/ML
333.33 VIAL (ML) INJECTION
Qty: 20 | Refills: 0 | Status: DISCONTINUED | OUTPATIENT
Start: 2019-07-11 | End: 2019-07-14

## 2019-07-11 RX ORDER — CITRIC ACID/SODIUM CITRATE 300-500 MG
15 SOLUTION, ORAL ORAL EVERY 6 HOURS
Refills: 0 | Status: DISCONTINUED | OUTPATIENT
Start: 2019-07-11 | End: 2019-07-12

## 2019-07-11 RX ORDER — SODIUM CHLORIDE 9 MG/ML
1000 INJECTION, SOLUTION INTRAVENOUS
Refills: 0 | Status: DISCONTINUED | OUTPATIENT
Start: 2019-07-11 | End: 2019-07-12

## 2019-07-11 RX ORDER — SODIUM CHLORIDE 9 MG/ML
1000 INJECTION INTRAMUSCULAR; INTRAVENOUS; SUBCUTANEOUS
Refills: 0 | Status: COMPLETED | OUTPATIENT
Start: 2019-07-11 | End: 2019-07-11

## 2019-07-11 RX ADMIN — SODIUM CHLORIDE 125 MILLILITER(S): 9 INJECTION, SOLUTION INTRAVENOUS at 21:36

## 2019-07-11 RX ADMIN — SODIUM CHLORIDE 125 MILLILITER(S): 9 INJECTION INTRAMUSCULAR; INTRAVENOUS; SUBCUTANEOUS at 21:37

## 2019-07-12 DIAGNOSIS — E11.9 TYPE 2 DIABETES MELLITUS WITHOUT COMPLICATIONS: ICD-10-CM

## 2019-07-12 LAB
ALBUMIN SERPL ELPH-MCNC: 3.2 G/DL — LOW (ref 3.3–5)
ALP SERPL-CCNC: 150 U/L — HIGH (ref 40–120)
ALT FLD-CCNC: 10 U/L — SIGNIFICANT CHANGE UP (ref 10–45)
ANION GAP SERPL CALC-SCNC: 8 MMOL/L — SIGNIFICANT CHANGE UP (ref 5–17)
APTT BLD: 19.9 SEC — LOW (ref 27.5–36.3)
AST SERPL-CCNC: 15 U/L — SIGNIFICANT CHANGE UP (ref 10–40)
BASOPHILS # BLD AUTO: 0.1 K/UL — SIGNIFICANT CHANGE UP (ref 0–0.2)
BASOPHILS NFR BLD AUTO: 0.9 % — SIGNIFICANT CHANGE UP (ref 0–2)
BILIRUB SERPL-MCNC: 0.2 MG/DL — SIGNIFICANT CHANGE UP (ref 0.2–1.2)
BUN SERPL-MCNC: 7 MG/DL — SIGNIFICANT CHANGE UP (ref 7–23)
CALCIUM SERPL-MCNC: 8.4 MG/DL — SIGNIFICANT CHANGE UP (ref 8.4–10.5)
CHLORIDE SERPL-SCNC: 107 MMOL/L — SIGNIFICANT CHANGE UP (ref 96–108)
CO2 SERPL-SCNC: 21 MMOL/L — LOW (ref 22–31)
CREAT SERPL-MCNC: 0.79 MG/DL — SIGNIFICANT CHANGE UP (ref 0.5–1.3)
EOSINOPHIL # BLD AUTO: 0.5 K/UL — SIGNIFICANT CHANGE UP (ref 0–0.5)
EOSINOPHIL NFR BLD AUTO: 6.6 % — HIGH (ref 0–6)
FIBRINOGEN PPP-MCNC: 640 MG/DL — HIGH (ref 350–510)
GLUCOSE BLDC GLUCOMTR-MCNC: 115 MG/DL — HIGH (ref 70–99)
GLUCOSE BLDC GLUCOMTR-MCNC: 134 MG/DL — HIGH (ref 70–99)
GLUCOSE BLDC GLUCOMTR-MCNC: 73 MG/DL — SIGNIFICANT CHANGE UP (ref 70–99)
GLUCOSE BLDC GLUCOMTR-MCNC: 80 MG/DL — SIGNIFICANT CHANGE UP (ref 70–99)
GLUCOSE BLDC GLUCOMTR-MCNC: 84 MG/DL — SIGNIFICANT CHANGE UP (ref 70–99)
GLUCOSE BLDC GLUCOMTR-MCNC: 86 MG/DL — SIGNIFICANT CHANGE UP (ref 70–99)
GLUCOSE BLDC GLUCOMTR-MCNC: 88 MG/DL — SIGNIFICANT CHANGE UP (ref 70–99)
GLUCOSE BLDC GLUCOMTR-MCNC: 88 MG/DL — SIGNIFICANT CHANGE UP (ref 70–99)
GLUCOSE BLDC GLUCOMTR-MCNC: 96 MG/DL — SIGNIFICANT CHANGE UP (ref 70–99)
GLUCOSE SERPL-MCNC: 86 MG/DL — SIGNIFICANT CHANGE UP (ref 70–99)
HCT VFR BLD CALC: 33.6 % — LOW (ref 34.5–45)
HGB BLD-MCNC: 12.2 G/DL — SIGNIFICANT CHANGE UP (ref 11.5–15.5)
INR BLD: 0.89 RATIO — SIGNIFICANT CHANGE UP (ref 0.88–1.16)
LDH SERPL L TO P-CCNC: 127 U/L — SIGNIFICANT CHANGE UP (ref 50–242)
LYMPHOCYTES # BLD AUTO: 1.8 K/UL — SIGNIFICANT CHANGE UP (ref 1–3.3)
LYMPHOCYTES # BLD AUTO: 21.6 % — SIGNIFICANT CHANGE UP (ref 13–44)
MCHC RBC-ENTMCNC: 32 PG — SIGNIFICANT CHANGE UP (ref 27–34)
MCHC RBC-ENTMCNC: 36.4 GM/DL — HIGH (ref 32–36)
MCV RBC AUTO: 87.9 FL — SIGNIFICANT CHANGE UP (ref 80–100)
MONOCYTES # BLD AUTO: 0.6 K/UL — SIGNIFICANT CHANGE UP (ref 0–0.9)
MONOCYTES NFR BLD AUTO: 6.8 % — SIGNIFICANT CHANGE UP (ref 2–14)
NEUTROPHILS # BLD AUTO: 5.3 K/UL — SIGNIFICANT CHANGE UP (ref 1.8–7.4)
NEUTROPHILS NFR BLD AUTO: 64 % — SIGNIFICANT CHANGE UP (ref 43–77)
PLATELET # BLD AUTO: 257 K/UL — SIGNIFICANT CHANGE UP (ref 150–400)
POTASSIUM SERPL-MCNC: 3.9 MMOL/L — SIGNIFICANT CHANGE UP (ref 3.5–5.3)
POTASSIUM SERPL-SCNC: 3.9 MMOL/L — SIGNIFICANT CHANGE UP (ref 3.5–5.3)
PROT SERPL-MCNC: 6.1 G/DL — SIGNIFICANT CHANGE UP (ref 6–8.3)
PROTHROM AB SERPL-ACNC: 10.2 SEC — SIGNIFICANT CHANGE UP (ref 10–12.9)
RBC # BLD: 3.83 M/UL — SIGNIFICANT CHANGE UP (ref 3.8–5.2)
RBC # FLD: 14.7 % — HIGH (ref 10.3–14.5)
SODIUM SERPL-SCNC: 136 MMOL/L — SIGNIFICANT CHANGE UP (ref 135–145)
T PALLIDUM AB TITR SER: NEGATIVE — SIGNIFICANT CHANGE UP
URATE SERPL-MCNC: 5.3 MG/DL — SIGNIFICANT CHANGE UP (ref 2.5–7)
WBC # BLD: 8.2 K/UL — SIGNIFICANT CHANGE UP (ref 3.8–10.5)
WBC # FLD AUTO: 8.2 K/UL — SIGNIFICANT CHANGE UP (ref 3.8–10.5)

## 2019-07-12 PROCEDURE — 59400 OBSTETRICAL CARE: CPT

## 2019-07-12 PROCEDURE — 99254 IP/OBS CNSLTJ NEW/EST MOD 60: CPT

## 2019-07-12 RX ORDER — PRAMOXINE HYDROCHLORIDE 150 MG/15G
1 AEROSOL, FOAM RECTAL EVERY 4 HOURS
Refills: 0 | Status: DISCONTINUED | OUTPATIENT
Start: 2019-07-12 | End: 2019-07-14

## 2019-07-12 RX ORDER — DEXTROSE 50 % IN WATER 50 %
15 SYRINGE (ML) INTRAVENOUS ONCE
Refills: 0 | Status: DISCONTINUED | OUTPATIENT
Start: 2019-07-12 | End: 2019-07-14

## 2019-07-12 RX ORDER — DEXTROSE 50 % IN WATER 50 %
25 SYRINGE (ML) INTRAVENOUS ONCE
Refills: 0 | Status: DISCONTINUED | OUTPATIENT
Start: 2019-07-12 | End: 2019-07-14

## 2019-07-12 RX ORDER — BENZOCAINE 10 %
1 GEL (GRAM) MUCOUS MEMBRANE EVERY 6 HOURS
Refills: 0 | Status: DISCONTINUED | OUTPATIENT
Start: 2019-07-12 | End: 2019-07-14

## 2019-07-12 RX ORDER — LANOLIN
1 OINTMENT (GRAM) TOPICAL EVERY 6 HOURS
Refills: 0 | Status: DISCONTINUED | OUTPATIENT
Start: 2019-07-12 | End: 2019-07-14

## 2019-07-12 RX ORDER — INSULIN LISPRO 100/ML
VIAL (ML) SUBCUTANEOUS
Refills: 0 | Status: DISCONTINUED | OUTPATIENT
Start: 2019-07-12 | End: 2019-07-14

## 2019-07-12 RX ORDER — OXYCODONE HYDROCHLORIDE 5 MG/1
5 TABLET ORAL
Refills: 0 | Status: DISCONTINUED | OUTPATIENT
Start: 2019-07-12 | End: 2019-07-14

## 2019-07-12 RX ORDER — DIBUCAINE 1 %
1 OINTMENT (GRAM) RECTAL EVERY 6 HOURS
Refills: 0 | Status: DISCONTINUED | OUTPATIENT
Start: 2019-07-12 | End: 2019-07-14

## 2019-07-12 RX ORDER — SODIUM CHLORIDE 9 MG/ML
3 INJECTION INTRAMUSCULAR; INTRAVENOUS; SUBCUTANEOUS EVERY 8 HOURS
Refills: 0 | Status: DISCONTINUED | OUTPATIENT
Start: 2019-07-12 | End: 2019-07-14

## 2019-07-12 RX ORDER — DEXTROSE 50 % IN WATER 50 %
12.5 SYRINGE (ML) INTRAVENOUS ONCE
Refills: 0 | Status: DISCONTINUED | OUTPATIENT
Start: 2019-07-12 | End: 2019-07-14

## 2019-07-12 RX ORDER — AER TRAVELER 0.5 G/1
1 SOLUTION RECTAL; TOPICAL EVERY 4 HOURS
Refills: 0 | Status: DISCONTINUED | OUTPATIENT
Start: 2019-07-12 | End: 2019-07-14

## 2019-07-12 RX ORDER — DOCUSATE SODIUM 100 MG
100 CAPSULE ORAL
Refills: 0 | Status: DISCONTINUED | OUTPATIENT
Start: 2019-07-12 | End: 2019-07-14

## 2019-07-12 RX ORDER — GLYCERIN ADULT
1 SUPPOSITORY, RECTAL RECTAL AT BEDTIME
Refills: 0 | Status: DISCONTINUED | OUTPATIENT
Start: 2019-07-12 | End: 2019-07-14

## 2019-07-12 RX ORDER — HYDROCORTISONE 1 %
1 OINTMENT (GRAM) TOPICAL EVERY 6 HOURS
Refills: 0 | Status: DISCONTINUED | OUTPATIENT
Start: 2019-07-12 | End: 2019-07-14

## 2019-07-12 RX ORDER — SODIUM CHLORIDE 9 MG/ML
1000 INJECTION, SOLUTION INTRAVENOUS
Refills: 0 | Status: DISCONTINUED | OUTPATIENT
Start: 2019-07-12 | End: 2019-07-14

## 2019-07-12 RX ORDER — TETANUS TOXOID, REDUCED DIPHTHERIA TOXOID AND ACELLULAR PERTUSSIS VACCINE, ADSORBED 5; 2.5; 8; 8; 2.5 [IU]/.5ML; [IU]/.5ML; UG/.5ML; UG/.5ML; UG/.5ML
0.5 SUSPENSION INTRAMUSCULAR ONCE
Refills: 0 | Status: COMPLETED | OUTPATIENT
Start: 2019-07-12

## 2019-07-12 RX ORDER — KETOROLAC TROMETHAMINE 30 MG/ML
30 SYRINGE (ML) INJECTION ONCE
Refills: 0 | Status: DISCONTINUED | OUTPATIENT
Start: 2019-07-12 | End: 2019-07-12

## 2019-07-12 RX ORDER — SIMETHICONE 80 MG/1
80 TABLET, CHEWABLE ORAL EVERY 4 HOURS
Refills: 0 | Status: DISCONTINUED | OUTPATIENT
Start: 2019-07-12 | End: 2019-07-14

## 2019-07-12 RX ORDER — OXYCODONE HYDROCHLORIDE 5 MG/1
5 TABLET ORAL ONCE
Refills: 0 | Status: DISCONTINUED | OUTPATIENT
Start: 2019-07-12 | End: 2019-07-14

## 2019-07-12 RX ORDER — DIPHENHYDRAMINE HCL 50 MG
25 CAPSULE ORAL EVERY 6 HOURS
Refills: 0 | Status: DISCONTINUED | OUTPATIENT
Start: 2019-07-12 | End: 2019-07-14

## 2019-07-12 RX ORDER — IBUPROFEN 200 MG
600 TABLET ORAL EVERY 6 HOURS
Refills: 0 | Status: COMPLETED | OUTPATIENT
Start: 2019-07-12 | End: 2020-06-09

## 2019-07-12 RX ORDER — OXYTOCIN 10 UNIT/ML
333.33 VIAL (ML) INJECTION
Qty: 20 | Refills: 0 | Status: DISCONTINUED | OUTPATIENT
Start: 2019-07-12 | End: 2019-07-14

## 2019-07-12 RX ORDER — MAGNESIUM HYDROXIDE 400 MG/1
30 TABLET, CHEWABLE ORAL
Refills: 0 | Status: DISCONTINUED | OUTPATIENT
Start: 2019-07-12 | End: 2019-07-14

## 2019-07-12 RX ORDER — GLUCAGON INJECTION, SOLUTION 0.5 MG/.1ML
1 INJECTION, SOLUTION SUBCUTANEOUS ONCE
Refills: 0 | Status: DISCONTINUED | OUTPATIENT
Start: 2019-07-12 | End: 2019-07-14

## 2019-07-12 RX ORDER — IBUPROFEN 200 MG
600 TABLET ORAL EVERY 6 HOURS
Refills: 0 | Status: DISCONTINUED | OUTPATIENT
Start: 2019-07-12 | End: 2019-07-14

## 2019-07-12 RX ORDER — ACETAMINOPHEN 500 MG
975 TABLET ORAL
Refills: 0 | Status: DISCONTINUED | OUTPATIENT
Start: 2019-07-12 | End: 2019-07-14

## 2019-07-12 RX ORDER — NIFEDIPINE 30 MG
90 TABLET, EXTENDED RELEASE 24 HR ORAL DAILY
Refills: 0 | Status: DISCONTINUED | OUTPATIENT
Start: 2019-07-12 | End: 2019-07-14

## 2019-07-12 RX ADMIN — Medication 600 MILLIGRAM(S): at 21:17

## 2019-07-12 RX ADMIN — Medication 90 MILLIGRAM(S): at 08:16

## 2019-07-12 RX ADMIN — Medication 1 TABLET(S): at 18:01

## 2019-07-12 RX ADMIN — Medication 600 MILLIGRAM(S): at 20:47

## 2019-07-12 RX ADMIN — Medication 975 MILLIGRAM(S): at 18:01

## 2019-07-12 RX ADMIN — Medication 1000 MILLIUNIT(S)/MIN: at 12:40

## 2019-07-12 RX ADMIN — Medication 975 MILLIGRAM(S): at 19:00

## 2019-07-12 NOTE — PRE-ANESTHESIA EVALUATION ADULT - NSANTHOSAYNRD_GEN_A_CORE
No. [place] screening performed.  STOP BANG Legend: 0-2 = LOW Risk; 3-4 = INTERMEDIATE Risk; 5-8 = HIGH Risk

## 2019-07-12 NOTE — CONSULT NOTE ADULT - PROBLEM SELECTOR RECOMMENDATION 9
Diabetes Education and Nutrition Eval  Monitor on correction scale for now.  Glucose values currently at goal.   Low correction scale qac + bedtime  Goal glucose 100-180  Outpt. follow-up with PCP  Outpt. optho, podiatry, micro/cr  Plan to d/c off pharmacologic treatment if glucose values remain <100 consistently. If >100 would d/c on metformin 500mg daily with dietary and lifestyle modifications. She plans on breastfeeding.   Will sign off at this time. Please reconsult if needed.

## 2019-07-12 NOTE — CONSULT NOTE ADULT - SUBJECTIVE AND OBJECTIVE BOX
HPI:  34 y/o F w/ hx of Type 2 DM diagnosed last summer. No reported micro or macrovascular complications. Started on metformin 500mg daily. Increased to BID dosing when she was on steroids for her asthma. Became pregnant with increased doses of metformin up to 1 tab in the morning and 2 tabs in the evening. During the pregnancy started on Novolin 10 units qhs which was discontinued during the 3rd trimester when her steroids were discontinued. She remained on metformin with glucose values generally 70's fasting and  pre and post-prandial. No reported hypoglycemia. Monitors carbs. No soda or juice. Denies polyuria or polydipsia. Mother and Aunt with Type 2 DM. Now s/p vaginal delivery at 37 weeks with 6lb. baby girl.       PAST MEDICAL & SURGICAL HISTORY:  Hypertension  Severe asthma  Type 2 DM   No significant past surgical history      FAMILY HISTORY:  Mother and Aunt with Type 2 DM       Social History: No tobacco or alcohol use    Outpatient Medications:  Metformin 500mg in the morning    MEDICATIONS  (STANDING):  acetaminophen   Tablet .. 975 milliGRAM(s) Oral <User Schedule>  diphtheria/tetanus/pertussis (acellular) Vaccine (ADAcel) 0.5 milliLiter(s) IntraMuscular once  ibuprofen  Tablet. 600 milliGRAM(s) Oral every 6 hours  NIFEdipine XL 90 milliGRAM(s) Oral daily  oxytocin Infusion 333.333 milliUNIT(s)/Min (1000 mL/Hr) IV Continuous <Continuous>  oxytocin Infusion 333.333 milliUNIT(s)/Min (1000 mL/Hr) IV Continuous <Continuous>  prenatal multivitamin 1 Tablet(s) Oral daily  sodium chloride 0.9% lock flush 3 milliLiter(s) IV Push every 8 hours    MEDICATIONS  (PRN):  benzocaine 20%/menthol 0.5% Spray 1 Spray(s) Topical every 6 hours PRN for Perineal discomfort  dibucaine 1% Ointment 1 Application(s) Topical every 6 hours PRN Perineal discomfort  diphenhydrAMINE 25 milliGRAM(s) Oral every 6 hours PRN Pruritus  docusate sodium 100 milliGRAM(s) Oral two times a day PRN For stool softening  glycerin Suppository - Adult 1 Suppository(s) Rectal at bedtime PRN Constipation  hydrocortisone 1% Cream 1 Application(s) Topical every 6 hours PRN Moderate Pain (4-6)  lanolin Ointment 1 Application(s) Topical every 6 hours PRN nipple soreness  magnesium hydroxide Suspension 30 milliLiter(s) Oral two times a day PRN Constipation  oxyCODONE    IR 5 milliGRAM(s) Oral every 3 hours PRN Moderate to Severe Pain (4-10)  oxyCODONE    IR 5 milliGRAM(s) Oral once PRN Moderate to Severe Pain (4-10)  pramoxine 1%/zinc 5% Cream 1 Application(s) Topical every 4 hours PRN Moderate Pain (4-6)  simethicone 80 milliGRAM(s) Chew every 4 hours PRN Gas  witch hazel Pads 1 Application(s) Topical every 4 hours PRN Perineal discomfort      Allergies    No Known Allergies    Intolerances      Review of Systems:  Constitutional: No fever, No change in weight  Eyes: No blurry vision  Neuro: No headache, No paresthesias  HEENT: No throat pain  Cardiovascular: No chest pain  Respiratory: No SOB  GI: No nausea or vomiting  : No polyuria  Skin: no rash  Psych: no depression  Endocrine: No polydipsia, No heat or cold intolerance, rest as noted in HPI  Hem/lymph: no swelling    All other review of systems negative      PHYSICAL EXAM:  VITALS: T(C): 36.8 (07-12-19 @ 15:00)  T(F): 98.2 (07-12-19 @ 15:00), Max: 99.7 (07-12-19 @ 13:00)  HR: 96 (07-12-19 @ 15:00) (83 - 113)  BP: 136/84 (07-12-19 @ 15:00) (127/87 - 142/100)  RR:  (18 - 21)  SpO2:  (95% - 98%)  Wt(kg): --  GENERAL: NAD at this time  EYES: No proptosis, EOMI  HEENT:  Atraumatic, Normocephalic,   THYROID: Normal size, no palpable nodules  RESPIRATORY: Clear to auscultation bilaterally, full excursion, non-labored  CARDIOVASCULAR: Regular rhythm; No murmurs; no peripheral edema  GI: Soft, gravid, nontender, non distended, normal bowel sounds  SKIN: Dry, intact, No rashes or lesions  MUSCULOSKELETAL: normal strength  NEURO: follows commands  PSYCH: Alert and oriented x 3, normal affect, normal mood  CUSHING'S SIGNS: no striae      POCT Blood Glucose.: 88 mg/dL (07-12-19 @ 12:02)  POCT Blood Glucose.: 88 mg/dL (07-12-19 @ 11:06)  POCT Blood Glucose.: 80 mg/dL (07-12-19 @ 09:04)  POCT Blood Glucose.: 84 mg/dL (07-12-19 @ 05:05)  POCT Blood Glucose.: 86 mg/dL (07-12-19 @ 01:11)  POCT Blood Glucose.: 73 mg/dL (07-11-19 @ 21:06)                              12.2   8.2   )-----------( 257      ( 12 Jul 2019 08:16 )             33.6       07-12    136  |  107  |  7   ----------------------------<  86  3.9   |  21<L>  |  0.79    EGFR if : 113  EGFR if non : 98    Ca    8.4      07-12    TPro  6.1  /  Alb  3.2<L>  /  TBili  0.2  /  DBili  x   /  AST  15  /  ALT  10  /  AlkPhos  150<H>  07-12    Thyroid Function Tests:            Radiology:

## 2019-07-13 DIAGNOSIS — I10 ESSENTIAL (PRIMARY) HYPERTENSION: ICD-10-CM

## 2019-07-13 LAB
GLUCOSE BLDC GLUCOMTR-MCNC: 109 MG/DL — HIGH (ref 70–99)
GLUCOSE BLDC GLUCOMTR-MCNC: 79 MG/DL — SIGNIFICANT CHANGE UP (ref 70–99)
GLUCOSE BLDC GLUCOMTR-MCNC: 81 MG/DL — SIGNIFICANT CHANGE UP (ref 70–99)
HBA1C BLD-MCNC: 5.8 % — HIGH (ref 4–5.6)
HCT VFR BLD CALC: 35.1 % — SIGNIFICANT CHANGE UP (ref 34.5–45)
HGB BLD-MCNC: 11.9 G/DL — SIGNIFICANT CHANGE UP (ref 11.5–15.5)

## 2019-07-13 RX ADMIN — Medication 975 MILLIGRAM(S): at 11:14

## 2019-07-13 RX ADMIN — Medication 975 MILLIGRAM(S): at 00:19

## 2019-07-13 RX ADMIN — Medication 600 MILLIGRAM(S): at 02:45

## 2019-07-13 RX ADMIN — Medication 1 TABLET(S): at 11:14

## 2019-07-13 RX ADMIN — Medication 600 MILLIGRAM(S): at 08:20

## 2019-07-13 RX ADMIN — Medication 975 MILLIGRAM(S): at 18:17

## 2019-07-13 RX ADMIN — Medication 975 MILLIGRAM(S): at 00:49

## 2019-07-13 RX ADMIN — Medication 90 MILLIGRAM(S): at 08:20

## 2019-07-13 RX ADMIN — Medication 600 MILLIGRAM(S): at 03:15

## 2019-07-13 RX ADMIN — Medication 600 MILLIGRAM(S): at 09:04

## 2019-07-13 RX ADMIN — Medication 975 MILLIGRAM(S): at 23:53

## 2019-07-13 RX ADMIN — Medication 975 MILLIGRAM(S): at 18:45

## 2019-07-13 RX ADMIN — Medication 975 MILLIGRAM(S): at 11:55

## 2019-07-13 NOTE — PROGRESS NOTE ADULT - SUBJECTIVE AND OBJECTIVE BOX
S: Patient doing well. no complaints.  out of bed, tolerating regular diet.  breast feeding. voiding spontaneously. Minimal lochia. Pain controlled.  she denies chest pain/changes in vision, headache, right upper quadrant pain.  Denies nausea/vomiting.     O: Vital Signs Last 24 Hrs  T(C): 36.4 (13 Jul 2019 09:22), Max: 37.6 (12 Jul 2019 13:00)  T(F): 97.6 (13 Jul 2019 09:22), Max: 99.7 (12 Jul 2019 13:00)  HR: 78 (13 Jul 2019 09:22) (65 - 113)  BP: 133/88 (13 Jul 2019 09:22) (125/86 - 141/95)  BP(mean): 104 (12 Jul 2019 15:00) (104 - 105)  RR: 18 (13 Jul 2019 09:22) (16 - 18)  SpO2: 99% (13 Jul 2019 09:22) (95% - 99%)    Gen: NAD  Abd: soft, nontender, nondistended, fundus firm below umbilicus  Lochia: moderate  Ext: warm, well perfused bilaterally, nontender bilaterally    Labs:                        11.9   x     )-----------( x        ( 13 Jul 2019 06:35 )             35.1

## 2019-07-13 NOTE — PROGRESS NOTE ADULT - ASSESSMENT
A/P: 33y  PPD#1, with chronic HTN, DM2, severe persistent asthma s/p  doing well.  1. cards:   - Procardia 90mg XL. BPs improving, continue to monitor post partum.  - reviewed signs/symptoms preeclampsia, pt aware to inform nurse if she has any symptoms  2. Endo  - FS, RISS  3. pulm  - asymptomatic  4. ID  - no signs/symptoms of infection  5. Uro  - voiding spontaneously  6. GI  - tolerating regular diet  7. Neuro  - po analgesia prn  8. OB  - breast feeding

## 2019-07-13 NOTE — PROGRESS NOTE ADULT - SUBJECTIVE AND OBJECTIVE BOX
OB Progress Note:  PPD#1    S: 34yo with h/o cHTN and T2DM now PPD#1 s/p . Patient feels well. Pain is well controlled. She is tolerating a regular diet and passing flatus. She is voiding spontaneously, and ambulating without difficulty. Endorses light vaginal bleeding, soaking less than 1 pad/hour. Denies CP/SOB. Denies lightheadedness/dizziness. Denies N/V.     O:  Vitals:  Vital Signs Last 24 Hrs  T(C): 36.2 (2019 04:57), Max: 37.6 (2019 13:00)  T(F): 97.2 (2019 04:57), Max: 99.7 (2019 13:00)  HR: 72 (2019 05:40) (65 - 113)  BP: 130/87 (2019 05:40) (125/86 - 141/95)  BP(mean): 104 (2019 15:00) (104 - 105)  RR: 18 (2019 04:57) (16 - 18)  SpO2: 97% (2019 04:57) (95% - 98%)    MEDICATIONS  (STANDING):  acetaminophen   Tablet .. 975 milliGRAM(s) Oral <User Schedule>  dextrose 5%. 1000 milliLiter(s) (50 mL/Hr) IV Continuous <Continuous>  dextrose 50% Injectable 12.5 Gram(s) IV Push once  dextrose 50% Injectable 25 Gram(s) IV Push once  dextrose 50% Injectable 25 Gram(s) IV Push once  diphtheria/tetanus/pertussis (acellular) Vaccine (ADAcel) 0.5 milliLiter(s) IntraMuscular once  ibuprofen  Tablet. 600 milliGRAM(s) Oral every 6 hours  insulin lispro (HumaLOG) corrective regimen sliding scale   SubCutaneous three times a day before meals  NIFEdipine XL 90 milliGRAM(s) Oral daily  oxytocin Infusion 333.333 milliUNIT(s)/Min (1000 mL/Hr) IV Continuous <Continuous>  oxytocin Infusion 333.333 milliUNIT(s)/Min (1000 mL/Hr) IV Continuous <Continuous>  prenatal multivitamin 1 Tablet(s) Oral daily  sodium chloride 0.9% lock flush 3 milliLiter(s) IV Push every 8 hours      Labs:  Blood type: A Positive  Rubella IgG: RPR: Negative                          11.9   -- >-----------< --    (  @ 06:35 )             35.1                        12.2   8.2 >-----------< 257    (  @ 08:16 )             33.6<L>                        13.0   9.5 >-----------< 269    (  21:27 )             37.3    19 @ 08:16      136  |  107  |  7   ----------------------------<  86  3.9   |  21<L>  |  0.79    19 @ 21:27      134<L>  |  102  |  10  ----------------------------<  72  4.2   |  20<L>  |  0.80        Ca    8.4      2019 08:16  Ca    9.0      2019 21:27    TPro  6.1  /  Alb  3.2<L>  /  TBili  0.2  /  DBili  x   /  AST  15  /  ALT  10  /  AlkPhos  150<H>  19 @ 08:16  TPro  6.8  /  Alb  3.7  /  TBili  0.3  /  DBili  x   /  AST  16  /  ALT  15  /  AlkPhos  168<H>  19 @ 21:27      Physical Exam:  General: NAD  Heart: all extremities well perfused  Lungs: breathing comfortably  Abdomen: soft, non-tender, non-distended, fundus firm  Extremities: No calf tenderness or erythema

## 2019-07-13 NOTE — PROGRESS NOTE ADULT - PROBLEM SELECTOR PLAN 1
- Pain well controlled, continue current pain regimen  - Continue ambulation, SCDs when not ambulating  - Continue regular diet  - Hct stable (33.6 > 35.1, ). No evidence of ongoing bleeding.

## 2019-07-14 ENCOUNTER — TRANSCRIPTION ENCOUNTER (OUTPATIENT)
Age: 34
End: 2019-07-14

## 2019-07-14 VITALS
DIASTOLIC BLOOD PRESSURE: 94 MMHG | RESPIRATION RATE: 18 BRPM | HEART RATE: 84 BPM | TEMPERATURE: 98 F | SYSTOLIC BLOOD PRESSURE: 140 MMHG

## 2019-07-14 LAB — GLUCOSE BLDC GLUCOMTR-MCNC: 87 MG/DL — SIGNIFICANT CHANGE UP (ref 70–99)

## 2019-07-14 PROCEDURE — 84156 ASSAY OF PROTEIN URINE: CPT

## 2019-07-14 PROCEDURE — 86850 RBC ANTIBODY SCREEN: CPT

## 2019-07-14 PROCEDURE — 85018 HEMOGLOBIN: CPT

## 2019-07-14 PROCEDURE — 85027 COMPLETE CBC AUTOMATED: CPT

## 2019-07-14 PROCEDURE — 59025 FETAL NON-STRESS TEST: CPT

## 2019-07-14 PROCEDURE — 82570 ASSAY OF URINE CREATININE: CPT

## 2019-07-14 PROCEDURE — 83036 HEMOGLOBIN GLYCOSYLATED A1C: CPT

## 2019-07-14 PROCEDURE — 85384 FIBRINOGEN ACTIVITY: CPT

## 2019-07-14 PROCEDURE — 85014 HEMATOCRIT: CPT

## 2019-07-14 PROCEDURE — 86780 TREPONEMA PALLIDUM: CPT

## 2019-07-14 PROCEDURE — 85610 PROTHROMBIN TIME: CPT

## 2019-07-14 PROCEDURE — 82962 GLUCOSE BLOOD TEST: CPT

## 2019-07-14 PROCEDURE — 59050 FETAL MONITOR W/REPORT: CPT

## 2019-07-14 PROCEDURE — 84550 ASSAY OF BLOOD/URIC ACID: CPT

## 2019-07-14 PROCEDURE — 86900 BLOOD TYPING SEROLOGIC ABO: CPT

## 2019-07-14 PROCEDURE — 90715 TDAP VACCINE 7 YRS/> IM: CPT

## 2019-07-14 PROCEDURE — 85730 THROMBOPLASTIN TIME PARTIAL: CPT

## 2019-07-14 PROCEDURE — 81001 URINALYSIS AUTO W/SCOPE: CPT

## 2019-07-14 PROCEDURE — 83615 LACTATE (LD) (LDH) ENZYME: CPT

## 2019-07-14 PROCEDURE — 80053 COMPREHEN METABOLIC PANEL: CPT

## 2019-07-14 PROCEDURE — 86901 BLOOD TYPING SEROLOGIC RH(D): CPT

## 2019-07-14 RX ORDER — IBUPROFEN 200 MG
1 TABLET ORAL
Qty: 0 | Refills: 0 | DISCHARGE
Start: 2019-07-14

## 2019-07-14 RX ORDER — NIFEDIPINE 30 MG
1 TABLET, EXTENDED RELEASE 24 HR ORAL
Qty: 0 | Refills: 0 | DISCHARGE
Start: 2019-07-14

## 2019-07-14 RX ORDER — DESLORATADINE 5 MG/1
1 TABLET, FILM COATED ORAL
Qty: 0 | Refills: 0 | DISCHARGE

## 2019-07-14 RX ORDER — METFORMIN HYDROCHLORIDE 850 MG/1
1 TABLET ORAL
Qty: 0 | Refills: 0 | DISCHARGE

## 2019-07-14 RX ORDER — ACETAMINOPHEN 500 MG
3 TABLET ORAL
Qty: 0 | Refills: 0 | DISCHARGE
Start: 2019-07-14

## 2019-07-14 RX ORDER — TETANUS TOXOID, REDUCED DIPHTHERIA TOXOID AND ACELLULAR PERTUSSIS VACCINE, ADSORBED 5; 2.5; 8; 8; 2.5 [IU]/.5ML; [IU]/.5ML; UG/.5ML; UG/.5ML; UG/.5ML
0.5 SUSPENSION INTRAMUSCULAR ONCE
Refills: 0 | Status: COMPLETED | OUTPATIENT
Start: 2019-07-14 | End: 2019-07-14

## 2019-07-14 RX ORDER — MONTELUKAST 4 MG/1
1 TABLET, CHEWABLE ORAL
Qty: 0 | Refills: 0 | DISCHARGE

## 2019-07-14 RX ORDER — PANTOPRAZOLE SODIUM 20 MG/1
1 TABLET, DELAYED RELEASE ORAL
Qty: 0 | Refills: 0 | DISCHARGE

## 2019-07-14 RX ADMIN — Medication 975 MILLIGRAM(S): at 06:03

## 2019-07-14 RX ADMIN — TETANUS TOXOID, REDUCED DIPHTHERIA TOXOID AND ACELLULAR PERTUSSIS VACCINE, ADSORBED 0.5 MILLILITER(S): 5; 2.5; 8; 8; 2.5 SUSPENSION INTRAMUSCULAR at 04:34

## 2019-07-14 RX ADMIN — Medication 975 MILLIGRAM(S): at 00:39

## 2019-07-14 RX ADMIN — Medication 90 MILLIGRAM(S): at 07:18

## 2019-07-14 NOTE — DISCHARGE NOTE OB - CARE PROVIDER_API CALL
Marcin Duff)  Obstetrics and Gynecology  [address]  [place] [address]  Phone: [phone]  Fax: [phone]  Follow Up Time:

## 2019-07-14 NOTE — PROGRESS NOTE ADULT - SUBJECTIVE AND OBJECTIVE BOX
S: Patient doing well. no complaints.  out of bed, tolerating regular diet.  breast feeding. voiding spontaneously. Minimal lochia. Pain controlled.    O: Vital Signs Last 24 Hrs  T(C): 36.5 (14 Jul 2019 09:05), Max: 36.7 (14 Jul 2019 00:55)  T(F): 97.7 (14 Jul 2019 09:05), Max: 98.1 (14 Jul 2019 00:55)  HR: 84 (14 Jul 2019 09:05) (84 - 101)  BP: 140/94 (14 Jul 2019 09:05) (125/85 - 140/94)  BP(mean): --  RR: 18 (14 Jul 2019 09:05) (16 - 18)  SpO2: 96% (14 Jul 2019 06:07) (96% - 98%)    Gen: NAD  Abd: soft, nontender, nondistended, fundus firm below umbilicus  Lochia: moderate  Ext: warm, well perfused bilaterally, nontender bilaterally    Labs:                        11.9   x     )-----------( x        ( 13 Jul 2019 06:35 )             35.1

## 2019-07-14 NOTE — DISCHARGE NOTE OB - PATIENT PORTAL LINK FT
You can access the [facility] Patient Portal, offered by [facility], by registering with the following website: [email or web address]

## 2019-07-14 NOTE — DISCHARGE NOTE OB - CARE PLAN
Principal Discharge DX:	Vaginal delivery  Goal:	return to normal activity  Assessment and plan of treatment:	return to normal activity  Secondary Diagnosis:	Essential hypertension  Secondary Diagnosis:	Severe persistent asthma without complication  Secondary Diagnosis:	Type 2 diabetes mellitus without complication, with long-term current use of insulin

## 2019-07-14 NOTE — PROGRESS NOTE ADULT - ASSESSMENT
A/P: 33y  PPD#2, with chronic HTN, DM2, severe persistent asthma s/p  doing well.  1. cards:   - Procardia 90mg XL. BPs improving, continue to monitor post partum.  - reviewed signs/symptoms preeclampsia, pt aware to inform nurse if she has any symptoms  - reviewed BPs with MFM, to d/c on current procardia dose. pt has current dose at home.   2. Endo  - FS, RISS  - majority of FS are less than 100, reviewed FS with MFM. pt to f/u with endocrine in 2-4 weeks  3. pulm  - asymptomatic  4. ID  - no signs/symptoms of infection  5. Uro  - voiding spontaneously  6. GI  - tolerating regular ADA diet  7. Neuro  - po analgesia prn  8. OB  - breast feeding  9. Dispo  - d/c home, f/u in office in 1 week for BP check  - pelvic rest until full postpartum visit  - all questions/concerns addressed of pt and her partner addressed    Kati Doyle MD

## 2019-07-14 NOTE — DISCHARGE NOTE OB - SECONDARY DIAGNOSIS.
Essential hypertension Severe persistent asthma without complication Type 2 diabetes mellitus without complication, with long-term current use of insulin

## 2019-07-14 NOTE — DISCHARGE NOTE OB - HOSPITAL COURSE
Patient admitted July 12th, 2019 for induction for chronic hypertension.  Patient had uncomplicated vaginal delivery of viable female infant on July 12th, 2019.  Patient was seen by endocrine with plan to monitor finger sticks postpartum.  Maternal fetal medicine reviewed finger sticks, and it was determined that values did not reach level of treatment with metformin.  Blood pressures were monitored in the postpartum period, while patient was receiving Procardia 90mg and values were also reviewed by MFM and deemed controlled.  Patient reached all appropriate post-partum milestones.  She was discharged home in stable condition on July 14th, 2019 with instructions to follow up in office in 1 week for a blood pressure check.  She was instructed to continue Procardia at current dose.  Pelvic rest until full postpartum visit.

## 2019-07-14 NOTE — DISCHARGE NOTE OB - MEDICATION SUMMARY - MEDICATIONS TO TAKE
I will START or STAY ON the medications listed below when I get home from the hospital:    acetaminophen 325 mg oral tablet  -- 3 tab(s) by mouth   -- Indication: For Pain    ibuprofen 600 mg oral tablet  -- 1 tab(s) by mouth every 6 hours  -- Indication: For Pain    ipratropium-albuterol 0.5 mg-2.5 mg/3 mLinhalation solution  -- 3 milliliter(s) inhaled 4 times a day  -- Indication: For asthma    Ventolin HFA 90 mcg/inh inhalation aerosol  -- 1 puff(s) inhaled 4 times a day  -- Indication: For asthma    Spiriva Respimat 28 ACT 2.5 mcg/inh inhalation aerosol  -- 2 puff(s) inhaled once a day  -- Indication: For asthma    Advair Diskus 500 mcg-50 mcg inhalation powder  -- 1 puff(s) inhaled 2 times a day  -- Indication: For asthma    NIFEdipine 90 mg oral tablet, extended release  -- 1 tab(s) by mouth once a day  -- Indication: For Hypertension, unspecified type    Prenatal Multivitamins with Folic Acid 1 mg oral tablet  -- 1 tab(s) by mouth once a day  -- Indication: For nutrition    Qvar 80 mcg/inh inhalation aerosol  -- 1 puff(s) inhaled 2 times a day  -- Indication: For asthma

## 2019-07-14 NOTE — DISCHARGE NOTE OB - MEDICATION SUMMARY - MEDICATIONS TO STOP TAKING
I will STOP taking the medications listed below when I get home from the hospital:    aspirin 81 mg oral tablet, chewable  -- 1 tab(s) by mouth once a day    progesterone 200 mg vaginal suppository  -- 1 suppository(ies) vaginally once a day    pantoprazole 40 mg oral delayed release tablet  -- 1 tab(s) by mouth once a day    predniSONE 20 mg oral tablet  -- 2 tab(s) by mouth once a day   -- It is very important that you take or use this exactly as directed.  Do not skip doses or discontinue unless directed by your doctor.  Obtain medical advice before taking any non-prescription drugs as some may affect the action of this medication.  Take with food or milk.

## 2019-07-15 ENCOUNTER — APPOINTMENT (OUTPATIENT)
Dept: ANTEPARTUM | Facility: CLINIC | Age: 34
End: 2019-07-15

## 2019-07-18 ENCOUNTER — APPOINTMENT (OUTPATIENT)
Dept: ANTEPARTUM | Facility: CLINIC | Age: 34
End: 2019-07-18

## 2019-07-19 ENCOUNTER — APPOINTMENT (OUTPATIENT)
Dept: OBGYN | Facility: CLINIC | Age: 34
End: 2019-07-19

## 2019-07-19 VITALS — DIASTOLIC BLOOD PRESSURE: 92 MMHG | SYSTOLIC BLOOD PRESSURE: 148 MMHG

## 2019-07-19 VITALS — SYSTOLIC BLOOD PRESSURE: 154 MMHG | DIASTOLIC BLOOD PRESSURE: 98 MMHG

## 2019-07-22 ENCOUNTER — APPOINTMENT (OUTPATIENT)
Dept: ANTEPARTUM | Facility: CLINIC | Age: 34
End: 2019-07-22

## 2019-07-25 ENCOUNTER — APPOINTMENT (OUTPATIENT)
Dept: ANTEPARTUM | Facility: CLINIC | Age: 34
End: 2019-07-25

## 2019-07-26 ENCOUNTER — APPOINTMENT (OUTPATIENT)
Dept: OBGYN | Facility: CLINIC | Age: 34
End: 2019-07-26

## 2019-07-26 VITALS
TEMPERATURE: 98.4 F | OXYGEN SATURATION: 96 % | SYSTOLIC BLOOD PRESSURE: 133 MMHG | DIASTOLIC BLOOD PRESSURE: 84 MMHG | HEIGHT: 62 IN | HEART RATE: 88 BPM | RESPIRATION RATE: 17 BRPM

## 2019-08-19 ENCOUNTER — NON-APPOINTMENT (OUTPATIENT)
Age: 34
End: 2019-08-19

## 2019-08-19 ENCOUNTER — APPOINTMENT (OUTPATIENT)
Dept: CARDIOLOGY | Facility: CLINIC | Age: 34
End: 2019-08-19
Payer: COMMERCIAL

## 2019-08-19 VITALS
HEIGHT: 62 IN | SYSTOLIC BLOOD PRESSURE: 118 MMHG | DIASTOLIC BLOOD PRESSURE: 82 MMHG | HEART RATE: 89 BPM | WEIGHT: 142 LBS | OXYGEN SATURATION: 95 % | BODY MASS INDEX: 26.13 KG/M2

## 2019-08-19 PROCEDURE — 93000 ELECTROCARDIOGRAM COMPLETE: CPT

## 2019-08-19 PROCEDURE — 99214 OFFICE O/P EST MOD 30 MIN: CPT

## 2019-08-19 NOTE — HISTORY OF PRESENT ILLNESS
[FreeTextEntry1] : Patient is a 33 year old female with a history of gestational HTN, preeclampsia,  allergic eosinophilia, allergic rhinitis, elevated IgE, prophylactic measure, severe persistent asthma and vitamin D deficiency presenting today to establish care in setting of recent hospitalization for an asthmatic attack. She is currently s/p vaginal  delivery on 7/12/19 at 37 weeks for elevated BPs - discharged on the procardia 90 mg.  She has no complaints at this time. \par \par - 1st pregnancy - gestational HTN - was on procardia with subsequent preeclampsia - at 38 weeks vaginal delivery 2014. Post the delivery stayed on meds - was changed to Benicar and then switched back to procardia when became pregnant\par - She was hospitalized on 3/20/2019 for an asthmatic attack following mold exposure at work. She is feeling much better and more like herself. She has been losing weight overall during her pregnancy. \par As per the patient, her breathing has improved following her hospitalization. SHe was on a prednisone taper post the hospitalization. s/p vaginal delivery on 7/12/19 at 37 weeks  - still breastfeeding\par

## 2019-08-19 NOTE — PHYSICAL EXAM
[General Appearance - Well Developed] : well developed [Normal Appearance] : normal appearance [Well Groomed] : well groomed [General Appearance - Well Nourished] : well nourished [No Deformities] : no deformities [General Appearance - In No Acute Distress] : no acute distress [Normal Conjunctiva] : the conjunctiva exhibited no abnormalities [Eyelids - No Xanthelasma] : the eyelids demonstrated no xanthelasmas [No Oral Pallor] : no oral pallor [Normal Oral Mucosa] : normal oral mucosa [No Oral Cyanosis] : no oral cyanosis [Normal Jugular Venous A Waves Present] : normal jugular venous A waves present [Normal Jugular Venous V Waves Present] : normal jugular venous V waves present [No Jugular Venous Roberts A Waves] : no jugular venous roberts A waves [Respiration, Rhythm And Depth] : normal respiratory rhythm and effort [Auscultation Breath Sounds / Voice Sounds] : lungs were clear to auscultation bilaterally [Exaggerated Use Of Accessory Muscles For Inspiration] : no accessory muscle use [Heart Rate And Rhythm] : heart rate and rhythm were normal [Heart Sounds] : normal S1 and S2 [Murmurs] : no murmurs present [Abdomen Soft] : soft [Abdomen Tenderness] : non-tender [Abdomen Mass (___ Cm)] : no abdominal mass palpated [Abnormal Walk] : normal gait [Gait - Sufficient For Exercise Testing] : the gait was sufficient for exercise testing [Nail Clubbing] : no clubbing of the fingernails [Cyanosis, Localized] : no localized cyanosis [Petechial Hemorrhages (___cm)] : no petechial hemorrhages [Skin Color & Pigmentation] : normal skin color and pigmentation [] : no rash [No Venous Stasis] : no venous stasis [Skin Lesions] : no skin lesions [No Skin Ulcers] : no skin ulcer [No Xanthoma] : no  xanthoma was observed [Oriented To Time, Place, And Person] : oriented to person, place, and time [Affect] : the affect was normal [Mood] : the mood was normal [No Anxiety] : not feeling anxious

## 2019-08-20 ENCOUNTER — APPOINTMENT (OUTPATIENT)
Dept: OBGYN | Facility: CLINIC | Age: 34
End: 2019-08-20
Payer: COMMERCIAL

## 2019-08-20 VITALS
SYSTOLIC BLOOD PRESSURE: 130 MMHG | HEART RATE: 88 BPM | DIASTOLIC BLOOD PRESSURE: 94 MMHG | HEIGHT: 62 IN | WEIGHT: 143 LBS | BODY MASS INDEX: 26.31 KG/M2

## 2019-08-20 PROCEDURE — 0503F POSTPARTUM CARE VISIT: CPT

## 2019-08-20 NOTE — HISTORY OF PRESENT ILLNESS
[Postpartum Follow Up] : postpartum follow up [Complications:___] : no complications [Delivery Date: ___] : on [unfilled] [] : delivered by vaginal delivery [Female] : Delivery History: baby girl [Wt. ___] : weighing [unfilled] [Rhogam] : Rhogam was not administered [Rubella Vaccine] : Rubella vaccine was not administered [Pertussis Vaccine] : Pertussis vaccine administered [BTL] : no tubal ligation [Breastfeeding] : currently nursing [Resumed Menses] : has not resumed her menses [Resumed [place]] : has not resumed intercourse [Intended Contraception] : Intended Contraception: [S/Sx PP Depression] : no signs/symptoms of postpartum depression [Erythema] : not erythematous [Mild] : mild vaginal bleeding [Normal] : the vagina was normal [Healing Well] : is healing well [Infected] : did not appear infected [Dehiscence] : was not dehisced [Cervix Sample Taken] : cervical sample not taken for a Pap smear [Not Done] : Examination of breasts not done [No Sign of Infection] : is showing no signs of infection [Doing Well] : is doing well [Excellent Pain Control] : has excellent pain control [None] : None [de-identified] : Saw cardiologist yesterday and had Procardia XL decreased from 90mg to 60mgqD; Diabetes meds D/C'd in hospital [FreeTextEntry8] : s/p   Maryjane Dianne 6-8, breastfeeding, no complaints re perineum; lochia mild; moods OK

## 2019-08-28 ENCOUNTER — MEDICATION RENEWAL (OUTPATIENT)
Age: 34
End: 2019-08-28

## 2019-08-28 ENCOUNTER — TRANSCRIPTION ENCOUNTER (OUTPATIENT)
Age: 34
End: 2019-08-28

## 2019-08-29 ENCOUNTER — TRANSCRIPTION ENCOUNTER (OUTPATIENT)
Age: 34
End: 2019-08-29

## 2019-09-14 NOTE — PATIENT PROFILE OB - NS PRO TDAP PRECAUTIONS
- Diagnosis


(1) Pre-syncope


Diagnosis Current: Yes   





(2) Fall


Diagnosis Current: Yes   





(3) Facial hematoma


Diagnosis Current: Yes   





(4) Hypertension


Diagnosis Current: Yes   





(5) Traumatic hematoma of forearm


Diagnosis Current: Yes   


Resuscitation Status: Full Code


Discussion: 


Patient says that she is a full code for now and prefers to receive chest 

compressions, defibrillation or mechanical ventilation if the need arises.  She 

does verbalize she does not want to be on prolonged ventilation support.  She 

says that her daughter, Cecilia Heck is her surrogate medical decision 

maker.
no precautions noted

## 2019-09-20 ENCOUNTER — APPOINTMENT (OUTPATIENT)
Dept: OPHTHALMOLOGY | Facility: CLINIC | Age: 34
End: 2019-09-20

## 2019-09-25 ENCOUNTER — RX RENEWAL (OUTPATIENT)
Age: 34
End: 2019-09-25

## 2019-10-31 ENCOUNTER — APPOINTMENT (OUTPATIENT)
Dept: CARDIOLOGY | Facility: CLINIC | Age: 34
End: 2019-10-31

## 2019-11-05 ENCOUNTER — APPOINTMENT (OUTPATIENT)
Dept: PULMONOLOGY | Facility: CLINIC | Age: 34
End: 2019-11-05

## 2019-11-05 ENCOUNTER — APPOINTMENT (OUTPATIENT)
Dept: PULMONOLOGY | Facility: CLINIC | Age: 34
End: 2019-11-05
Payer: COMMERCIAL

## 2019-11-05 ENCOUNTER — NON-APPOINTMENT (OUTPATIENT)
Age: 34
End: 2019-11-05

## 2019-11-05 VITALS
OXYGEN SATURATION: 93 % | SYSTOLIC BLOOD PRESSURE: 130 MMHG | BODY MASS INDEX: 26.87 KG/M2 | DIASTOLIC BLOOD PRESSURE: 80 MMHG | HEIGHT: 62 IN | RESPIRATION RATE: 17 BRPM | HEART RATE: 109 BPM | WEIGHT: 146 LBS

## 2019-11-05 PROCEDURE — 90682 RIV4 VACC RECOMBINANT DNA IM: CPT

## 2019-11-05 PROCEDURE — 94010 BREATHING CAPACITY TEST: CPT

## 2019-11-05 PROCEDURE — 99214 OFFICE O/P EST MOD 30 MIN: CPT | Mod: 25

## 2019-11-05 PROCEDURE — G0008: CPT

## 2019-11-05 NOTE — PROCEDURE
[FreeTextEntry1] : PFT - spi reveals severe restrictive / obstructive  dysfunction; FEV1 is 1.14 which is 39% of predicted, normal flow volume loop \par \par Blood work (July 2019) reveals: \par Eosinophil 6.6% (500 count)

## 2019-11-05 NOTE — ADDENDUM
[FreeTextEntry1] : All medical record entries made by crystal Dixon were at Dr. Zac Kelley's direction and personally dictated by me on 11/05/2019. I have reviewed the chart and agree that the record accurately reflects my personal performance of the history, physical exam, assessment and plan. I have also personally directed, reviewed, and agree with the discharge instructions.

## 2019-11-05 NOTE — ASSESSMENT
[FreeTextEntry1] : Ms. Blue is a 33 year old female with a history of asthma / GERD / Allergy / overweight. Her asthma is active and is both IgE mediated and eosinophil mediated (currently off on Nucala), and s/p delivery (7/12/2019) - now asthma flair due to weather. \par \par problem 1: severe persistent asthma (flair)\par -add Prednisone 30 mg x 5 days, 20 mg x 5 days, 10 mg x 5 days \par Information sheet given to the patient to be reviewed, this medication is never to be used without consulting the prescribing physician. Proper dietary restraint is necessary specifically salt containing foods, if any reaction may occur should be reported.  \par \par *climate related - GERD - avoid mold exposure\par -Continue albuterol by the nebulizer QID\par -continue to use Ventolin PRN and before exercise\par -continue to use Advair 500 at 1 inhalation BId\par -Continue QVar 80 at 2 inhalations BID\par -Continue Spiriva 2 inhalations QD\par -continue to use Singulair 10 mg before bed\par -all meds needed were renewed\par -Inhaler technique reviewed as well as oral hygiene techniques reviewed with patient. Avoidance of cold air, extremes of temperature, rescue inhaler should be used before exercise. Order of medication reviewed with patient. Recommended use of a cool mist humidifier in the bedroom. \par \par problem 2: Allergic Asthma (Elevated IgE)\par -her elevated IgE makes her a candidate for Xolair\par \par Problem 3; Eosinophilic Asthma\par -restart Nucala - hold due to pregnancy - recheck eosinophil level nad restart Rx\par -The safety and efficacy of Nucala was established in three double-blind, randomized, placebo-controlled trials in patients with severe asthma. Compared to a placebo, patients with severe asthma receiving Nucala had fewer exacerbation requiring hospitalization and/or emergency department visits, and a longer time to first exacerbation. In addition, patients with severe asthma receiving Nucala or Fasenra experienced greater reductions in their daily maintenance oral corticosteroid dose, while maintaining asthma control compared with patients receiving placebo. Treatment with Nucala did not result in a significant improvement in lung function, as measured by the volume of air exhaled by patients in one second. The most common side effects include: headache, injection site reactions, back pain, weakness, and fatigue; hypersensitivity reactions can occur within hours or days including swelling of the face, mouth, and tongue, fainting, dizziness, hives, breathing problems, and rash; herpes zoster infections have occurred. The drug is a monoclonal antibody that inhibits interleukin-5 which helps regular eosinophils, a type of white blood cell that contributes to asthma. The over-production of eosinophils can cause inflammation in the lungs, increasing the frequency of asthma attacks. Patients must also take other medications, including high dose inhaled corticosteroids and at least one additional asthma drug\par \par problem 4: allergic rhinitis\par -continue to use Flonase 1 sniff each nostril BID\par -continue to use Xyzal 5 mg before bed \par -Environmental measures for allergies were encouraged including mattress and pillow cover, air purifier, and environmental controls.\par \par problem 5: low vitamin D\par -continue to use supplemental vitamin D\par -Has been associated with asthma exacerbations and increased allergic symptoms. The goal based on recent information is maintaining levels between 50-70 and low normal is 30. Recommended 50,000 units every two weeks to once a month depending on the level. \par \par problem 6: GERD -(stable/controlled)\par -continue Protonix 40 mg QAM \par -Continue Pepcid 40 mg QHS \par -Rule of 2s: avoid eating too much, eating too late, eating too spicy, eating two hours before bed\par -Things to avoid including overeating, spicy foods, tight clothing, eating within three hours of bed, this list is not all inclusive. \par -For treatment of reflux, possible options discussed including diet control, H2 blockers, PPIs, as well as coating motility agents discussed as treatment options. Timing of meals and proximity of last meal to sleep were discussed. If symptoms persist, a formal gastrointestinal evaluation is needed. \par \par problem 7: overweight\par -Weight loss, exercise, and diet control were discussed and are highly encouraged. Treatment options were given such as, aqua therapy, and contacting a nutritionist. Recommended to use the elliptical, stationary bike, less use of treadmill. \par \par problem 8: health maintenance \par -recommended yearly flu shot after October 15 2018\par -recommended strep pneumonia vaccines: Prevnar-13 vaccine, followed by Pneumo vaccine 23 one year following\par -recommended early intervention for URIs\par -recommended regular osteoporosis evaluations\par -recommended early dermatological evaluations\par -recommended after the age of 50 to the age of 70, colonoscopy every 5 years \par \par Patient may follow up in 4-6 weeks.\par She is encouraged to call with any changes, concerns, or questions

## 2019-11-05 NOTE — HISTORY OF PRESENT ILLNESS
[FreeTextEntry1] : Ms. Blue is a 33 year old female with a history of allergic eosinophilia, eosinophilic asthma, allergic rhinitis, elevated IgE, severe persistent asthma and vitamin D deficiency presenting today for a follow up visit. Her chief complaint is wheeze. \par -she states that she has been wheezing since last week. she states that prior to this, she has been feeling generally well. she reports that she did not feel sick last week, only began to wheeze\par -she has not been sleeping well\par -she has been using Spiriva, Advair, Qvar, and Singulair regularly, and since she has begun to wheeze she has been using her nebulizer\par -she denies any headaches, nausea, vomiting, fever, chills, sweats, diarrhea, constipation, dysphagia, dizziness, leg swelling, leg pain, itchy eyes, itchy ears, heartburn, reflux, or sour taste in the mouth. 
Rebecca Queen

## 2019-11-05 NOTE — REASON FOR VISIT
[Follow-Up] : a follow-up visit [FreeTextEntry1] : eosinophilic asthma, allergic eosinophilia, allergic rhinitis, elevated IgE, severe persistent asthma and vitamin D deficiency

## 2019-11-17 ENCOUNTER — RX RENEWAL (OUTPATIENT)
Age: 34
End: 2019-11-17

## 2019-12-04 ENCOUNTER — APPOINTMENT (OUTPATIENT)
Dept: PULMONOLOGY | Facility: CLINIC | Age: 34
End: 2019-12-04
Payer: COMMERCIAL

## 2019-12-04 ENCOUNTER — NON-APPOINTMENT (OUTPATIENT)
Age: 34
End: 2019-12-04

## 2019-12-04 VITALS
HEART RATE: 130 BPM | OXYGEN SATURATION: 92 % | DIASTOLIC BLOOD PRESSURE: 85 MMHG | RESPIRATION RATE: 17 BRPM | BODY MASS INDEX: 28.13 KG/M2 | WEIGHT: 149 LBS | SYSTOLIC BLOOD PRESSURE: 130 MMHG | HEIGHT: 61 IN

## 2019-12-04 PROCEDURE — 95012 NITRIC OXIDE EXP GAS DETER: CPT

## 2019-12-04 PROCEDURE — 94010 BREATHING CAPACITY TEST: CPT

## 2019-12-04 PROCEDURE — 99214 OFFICE O/P EST MOD 30 MIN: CPT | Mod: 25

## 2019-12-04 RX ORDER — PREDNISONE 10 MG/1
10 TABLET ORAL
Qty: 15 | Refills: 0 | Status: DISCONTINUED | COMMUNITY
Start: 2019-05-21 | End: 2019-12-04

## 2019-12-04 RX ORDER — PREDNISONE 10 MG/1
10 TABLET ORAL
Qty: 50 | Refills: 0 | Status: DISCONTINUED | COMMUNITY
Start: 2019-11-05 | End: 2019-12-04

## 2019-12-04 RX ORDER — PREDNISONE 10 MG/1
10 TABLET ORAL
Qty: 40 | Refills: 0 | Status: DISCONTINUED | COMMUNITY
Start: 2019-03-22 | End: 2019-12-04

## 2019-12-04 NOTE — HISTORY OF PRESENT ILLNESS
[FreeTextEntry1] : Ms. Blue is a 34 year old female with a history of allergic eosinophilia, eosinophilic asthma, allergic rhinitis, elevated IgE, severe persistent asthma and vitamin D deficiency presenting today for a follow up visit. Her chief complaint is active asthma\par -she has been wheezing, having SOB, and had SOB while on her back last night, and got up to use her nebulizer\par -she reports her energy level could be better\par -she reports having gained 5 pounds\par -she states she hadn't been using her reflux medication, and needs to restart it\par -she notes her senses\par -she reports her vision is good\par -she reports the cold air is bothering her breathing\par -she is unsure if she snores\par -she reports she is using Singulair and an allergy pill\par -she did not go to get blood work, as she wasn't given the script\par -she reports her prescriptions aren't going through her pharmacy\par -she reports she uses all of her inhalers regularly\par -she is s/p the flu shot\par -she uses her nebulizer every night\par -she denies any post nasal drip, chest pain, chest pressure, diarrhea, constipation, dysphagia, dizziness, sour taste in the mouth

## 2019-12-04 NOTE — ADDENDUM
[FreeTextEntry1] : Documented by Adeel Vargas acting as a scribe for Dr. Zac Kelley on 12/04/2019.\par \par All medical record entries made by the Scribe were at my, Dr. Zac Kelley's, direction and personally dictated by me on 12/04/2019. I have reviewed the chart and agree that the record accurately reflects my personal performance of the history, physical exam, assessment and plan. I have also personally directed, reviewed, and agree with the discharge instructions.

## 2019-12-04 NOTE — REVIEW OF SYSTEMS
[Recent Wt Gain (___ Lbs)] : recent [unfilled] ~Ulb weight gain [Wheezing] : wheezing [Dyspnea] : dyspnea [As Noted in HPI] : as noted in HPI [Reflux] : reflux [Heartburn] : heartburn [Negative] : Sleep Disorder [Postnasal Drip] : no postnasal drip [Dysphagia] : no dysphagia [Chest Discomfort] : no chest discomfort [Constipation] : no constipation [Diarrhea] : no diarrhea

## 2019-12-04 NOTE — PHYSICAL EXAM
[Eyelids - No Xanthelasma] : the eyelids demonstrated no xanthelasmas [Normal Conjunctiva] : the conjunctiva exhibited no abnormalities [Normal Oropharynx] : normal oropharynx [II] : II [Neck Appearance] : the appearance of the neck was normal [Neck Cervical Mass (___cm)] : no neck mass was observed [Jugular Venous Distention Increased] : there was no jugular-venous distention [Thyroid Diffuse Enlargement] : the thyroid was not enlarged [Thyroid Nodule] : there were no palpable thyroid nodules [Heart Rate And Rhythm] : heart rate and rhythm were normal [Heart Sounds] : normal S1 and S2 [Murmurs] : no murmurs present [Respiration, Rhythm And Depth] : normal respiratory rhythm and effort [Exaggerated Use Of Accessory Muscles For Inspiration] : no accessory muscle use [Auscultation Breath Sounds / Voice Sounds] : lungs were clear to auscultation bilaterally [Abdomen Soft] : soft [Abdomen Tenderness] : non-tender [Abnormal Walk] : normal gait [Abdomen Mass (___ Cm)] : no abdominal mass palpated [Nail Clubbing] : no clubbing of the fingernails [Gait - Sufficient For Exercise Testing] : the gait was sufficient for exercise testing [Cyanosis, Localized] : no localized cyanosis [Petechial Hemorrhages (___cm)] : no petechial hemorrhages [Skin Color & Pigmentation] : normal skin color and pigmentation [Skin Lesions] : no skin lesions [] : no rash [No Venous Stasis] : no venous stasis [No Xanthoma] : no  xanthoma was observed [No Skin Ulcers] : no skin ulcer [Deep Tendon Reflexes (DTR)] : deep tendon reflexes were 2+ and symmetric [No Focal Deficits] : no focal deficits [Oriented To Time, Place, And Person] : oriented to person, place, and time [Sensation] : the sensory exam was normal to light touch and pinprick [Impaired Insight] : insight and judgment were intact [Affect] : the affect was normal [General Appearance - Well Developed] : well developed [Well Groomed] : well groomed [General Appearance - Well Nourished] : well nourished [Normal Appearance] : normal appearance [No Deformities] : no deformities [General Appearance - In No Acute Distress] : no acute distress [FreeTextEntry1] : I:E ratio 1:3; clear

## 2019-12-04 NOTE — PROCEDURE
[FreeTextEntry1] : PFT revealed moderate restrictive and moderate obstructive dysfunction, with a FEV1 of 1.40L, which is 50% of predicted, with a normal flow volume loop \par \par FENO was 69, a normal value being less than 25\par Fractional exhaled nitric oxide (FENO) is regarded as a simple, noninvasive method for assessing eosinophilic airway inflammation. Produced by a variety of cells within the lung, nitric oxide (NO) concentrations are generally low in healthy individuals. However, high concentrations of NO appear to be involved in nonspecific host defense mechanisms and chronic inflammatory diseases such as asthma. The American Thor[employer] Society (ATS) therefore has recommended using FENO to aid in the diagnosis and monitoring of eosinophilic airway inflammation and asthma, and for identifying steroid responsive individuals whose chronic respiratory symptoms may be caused by airway inflammation.

## 2019-12-04 NOTE — ASSESSMENT
[FreeTextEntry1] : Ms. Blue is a 34 year old female with a history of asthma / GERD / Allergy / overweight. Her asthma is active and is both IgE mediated and eosinophil mediated (currently off on Nucala), and s/p delivery (7/12/2019) - now asthma flair due to weather - still active.\par \par problem 1: severe persistent asthma (flair)\par -continue Prednisone; 10 mg QD, starting only after getting blood work taken\par Information sheet given to the patient to be reviewed, this medication is never to be used without consulting the prescribing physician. Proper dietary restraint is necessary specifically salt containing foods, if any reaction may occur should be reported. \par \par *climate related - GERD - avoid mold exposure\par -Continue albuterol by the nebulizer QID\par -Add Pulmicort (Budesonide) (0.5) BID by nebulizer, PRN  (gargle and rinse after use)\par -continue to use Ventolin PRN and before exercise\par -continue to use Advair 500 at 1 inhalation BId\par -Continue QVar 80 at 2 inhalations BID\par -Continue Spiriva 2 inhalations QD\par -continue to use Singulair 10 mg before bed\par -all meds needed were renewed\par -Inhaler technique reviewed as well as oral hygiene techniques reviewed with patient. Avoidance of cold air, extremes of temperature, rescue inhaler should be used before exercise. Order of medication reviewed with patient. Recommended use of a cool mist humidifier in the bedroom. \par \par problem 2: Allergic Asthma (Elevated IgE)\par -her elevated IgE makes her a candidate for Xolair\par \par Problem 3; Eosinophilic Asthma\par -restart Nucala - hold due to pregnancy - recheck eosinophil level - complete paper work today\par -The safety and efficacy of Nucala was established in three double-blind, randomized, placebo-controlled trials in patients with severe asthma. Compared to a placebo, patients with severe asthma receiving Nucala had fewer exacerbation requiring hospitalization and/or emergency department visits, and a longer time to first exacerbation. In addition, patients with severe asthma receiving Nucala or Fasenra experienced greater reductions in their daily maintenance oral corticosteroid dose, while maintaining asthma control compared with patients receiving placebo. Treatment with Nucala did not result in a significant improvement in lung function, as measured by the volume of air exhaled by patients in one second. The most common side effects include: headache, injection site reactions, back pain, weakness, and fatigue; hypersensitivity reactions can occur within hours or days including swelling of the face, mouth, and tongue, fainting, dizziness, hives, breathing problems, and rash; herpes zoster infections have occurred. The drug is a monoclonal antibody that inhibits interleukin-5 which helps regular eosinophils, a type of white blood cell that contributes to asthma. The over-production of eosinophils can cause inflammation in the lungs, increasing the frequency of asthma attacks. Patients must also take other medications, including high dose inhaled corticosteroids and at least one additional asthma drug\par \par problem 4: allergic rhinitis\par -continue to use Flonase 1 sniff each nostril BID\par -continue to use Xyzal 5 mg before bed \par -Environmental measures for allergies were encouraged including mattress and pillow cover, air purifier, and environmental controls.\par \par problem 5: low vitamin D\par -continue to use supplemental vitamin D\par -Has been associated with asthma exacerbations and increased allergic symptoms. The goal based on recent information is maintaining levels between 50-70 and low normal is 30. Recommended 50,000 units every two weeks to once a month depending on the level. \par \par problem 6: GERD -(stable/controlled)\par -continue Protonix 40 mg QAM \par -Continue Pepcid 40 mg QHS \par -Rule of 2s: avoid eating too much, eating too late, eating too spicy, eating two hours before bed\par -Things to avoid including overeating, spicy foods, tight clothing, eating within three hours of bed, this list is not all inclusive. \par -For treatment of reflux, possible options discussed including diet control, H2 blockers, PPIs, as well as coating motility agents discussed as treatment options. Timing of meals and proximity of last meal to sleep were discussed. If symptoms persist, a formal gastrointestinal evaluation is needed. \par \par problem 7: overweight\par -Weight loss, exercise, and diet control were discussed and are highly encouraged. Treatment options were given such as, aqua therapy, and contacting a nutritionist. Recommended to use the elliptical, stationary bike, less use of treadmill. \par \par problem 8: health maintenance \par -recommended yearly flu shot after October 15 (11/19)\par -recommended strep pneumonia vaccines: Prevnar-13 vaccine, followed by Pneumo vaccine 23 one year following\par -recommended early intervention for URIs\par -recommended regular osteoporosis evaluations\par -recommended early dermatological evaluations\par -recommended after the age of 50 to the age of 70, colonoscopy every 5 years \par \par Patient may follow up in 2 months with SPI and NiOx\par She is encouraged to call with any changes, concerns, or questions

## 2020-01-03 ENCOUNTER — APPOINTMENT (OUTPATIENT)
Dept: PULMONOLOGY | Facility: CLINIC | Age: 35
End: 2020-01-03
Payer: COMMERCIAL

## 2020-01-03 VITALS
OXYGEN SATURATION: 91 % | HEIGHT: 61 IN | DIASTOLIC BLOOD PRESSURE: 82 MMHG | WEIGHT: 151 LBS | HEART RATE: 109 BPM | SYSTOLIC BLOOD PRESSURE: 138 MMHG | BODY MASS INDEX: 28.51 KG/M2 | RESPIRATION RATE: 17 BRPM

## 2020-01-03 PROCEDURE — 71046 X-RAY EXAM CHEST 2 VIEWS: CPT

## 2020-01-03 PROCEDURE — 99214 OFFICE O/P EST MOD 30 MIN: CPT

## 2020-01-03 RX ORDER — TIOTROPIUM BROMIDE INHALATION SPRAY 3.12 UG/1
2.5 SPRAY, METERED RESPIRATORY (INHALATION)
Qty: 1 | Refills: 3 | Status: DISCONTINUED | COMMUNITY
Start: 2017-01-20 | End: 2020-01-03

## 2020-01-03 RX ORDER — FLUTICASONE PROPIONATE 220 UG/1
220 AEROSOL, METERED RESPIRATORY (INHALATION) TWICE DAILY
Qty: 3 | Refills: 1 | Status: DISCONTINUED | COMMUNITY
Start: 2018-04-16 | End: 2020-01-03

## 2020-01-04 NOTE — REVIEW OF SYSTEMS
[Cough] : cough [As Noted in HPI] : as noted in HPI [Chest Tightness] : chest tightness [Wheezing] : wheezing [Dyspnea] : dyspnea [Negative] : Pulmonary Hypertension

## 2020-01-04 NOTE — PROCEDURE
[FreeTextEntry1] : CXR done in office and read by Dr. Kelley.\par Impression: LLL lingular pneumonia noted.

## 2020-01-04 NOTE — HISTORY OF PRESENT ILLNESS
[FreeTextEntry1] : Ms. Blue is a 34 year old female with a history of allergic eosinophilia, eosinophilic asthma, allergic rhinitis, elevated IgE, severe persistent asthma and vitamin D deficiency presenting today for an acute visit. Her CC is persistent cough and wheezing x 3 days.\par \par She states that she has had cold-like symptoms x 1 week, but since 1/1 symptoms worsened to cough and wheezing. She notes her 5 month old daughter was just diagnosed with RSV today.  Patient states she has a productive cough with clear sputum during the day.  She notes the cough is dryer at night. She c/o chest tightness, dyspnea on exertion, wheezing and clearing her throat often.\par \par Patient currently treating Asthma with  Albuterol via nebulizer BID and Ventolin rescue inhaler BID-TID, Budesonide via nebulizer BID, QVar 80 2 puffs BID, Advair 500 1 inhalation BID, Spiriva 2 inhalations daily\par and Singulair 10 mg PO QHS. \par \par She denies fever/chills, night sweats, SOB @ rest, fatigue, decreased appetite, N/V/D, post-nasal drip, sore throat, dizziness, or palpitations.

## 2020-01-04 NOTE — ASSESSMENT
[FreeTextEntry1] : The plan for the patient is as follows: \par \par 1. Acute Asthma Exacerbation of Severe Persistent Asthma:\par - Add Prednisone: Take 30 mg x 5 days, 20 mg x 5 days, and 10 mg x 5 days.\par - Continue Albuterol via nebulizer QID -- at minimum TID.  Can use Ventolin rescue inhaler if nebulizer not accessible.\par - Continue Budesonide via nebulizer BID -- rinse & gargle post use.\par - DISCONTINUE QVar inhaler\par - Continue Advair 500 1 inhalation BID -- rinse and gargle post use.\par - Continue Spiriva 2 inhalations daily\par - Continue Singulair 10 mg PO QHS\par \par 2. LLL Pneumonia:\par - Add Ceftin 500 mg PO BID x 7 days.\par - Add Azithromycin 500 mg PO daily x 7 days.\par \par 3. Cough - multifactorial:\par - Asthma exacerbation & pneumonia \par - CXR done in office.\par \par 4. Eosinophilic Asthma:\par - Patient states she has been on Nucala in the past; last injection 11/2018.\par - Insurance just cleared her to restart Nucala therapy.\par - Patient awaiting to get her Epi-Pen before reinitiating therapy.  Stating it's on backorder at her pharmacy. \par \par 5. GERD, stable:\par - Continue Pantoprazole 40 mg PO before breakfast.\par \par Pt. to call with further concerns or questions.  If symptoms worsen, patient to go to nearest ED. Pt. verbalizes understanding and is agreeable.

## 2020-01-04 NOTE — PHYSICAL EXAM
[General Appearance - Well Developed] : well developed [Normal Appearance] : normal appearance [General Appearance - Well Nourished] : well nourished [Well Groomed] : well groomed [General Appearance - In No Acute Distress] : no acute distress [Neck Appearance] : the appearance of the neck was normal [Normal Oropharynx] : normal oropharynx [Normal Conjunctiva] : the conjunctiva exhibited no abnormalities [Heart Rate And Rhythm] : heart rate and rhythm were normal [Heart Sounds] : normal S1 and S2 [FreeTextEntry1] : BL Diffuse Expiratory and Inspiratory wheezes. [Abnormal Walk] : normal gait [Nail Clubbing] : no clubbing of the fingernails [] : no rash [Skin Turgor] : normal skin turgor [Cyanosis, Localized] : no localized cyanosis [Skin Color & Pigmentation] : normal skin color and pigmentation [Oriented To Time, Place, And Person] : oriented to person, place, and time [Impaired Insight] : insight and judgment were intact [Affect] : the affect was normal [Mood] : the mood was normal

## 2020-01-24 ENCOUNTER — RX RENEWAL (OUTPATIENT)
Age: 35
End: 2020-01-24

## 2020-02-20 ENCOUNTER — APPOINTMENT (OUTPATIENT)
Dept: PULMONOLOGY | Facility: CLINIC | Age: 35
End: 2020-02-20
Payer: COMMERCIAL

## 2020-02-20 VITALS
RESPIRATION RATE: 17 BRPM | WEIGHT: 159 LBS | HEART RATE: 95 BPM | BODY MASS INDEX: 29.26 KG/M2 | DIASTOLIC BLOOD PRESSURE: 80 MMHG | OXYGEN SATURATION: 94 % | SYSTOLIC BLOOD PRESSURE: 140 MMHG | HEIGHT: 62 IN

## 2020-02-20 PROCEDURE — 99214 OFFICE O/P EST MOD 30 MIN: CPT

## 2020-02-20 NOTE — REVIEW OF SYSTEMS
[Fatigue] : fatigue [Wheezing] : wheezing [SOB on Exertion] : sob on exertion [Back Pain] : back pain [Anxiety] : anxiety [Negative] : Endocrine [Fever] : no fever [Poor Appetite] : no poor appetite [Chills] : no chills [Sore Throat] : no sore throat [Postnasal Drip] : no postnasal drip [Nasal Congestion] : no nasal congestion [Sinus Problems] : no sinus problems [Cough] : no cough [Sputum] : no sputum [Chest Tightness] : no chest tightness [Dyspnea] : no dyspnea

## 2020-02-20 NOTE — ASSESSMENT
[FreeTextEntry1] : The plan for the patient is as follows:\par \par 1. Asthma, flare:\par - Add Prednisone 20 mg x 7 days then 10 mg x 7 days.\par - Discontinue Advair 500\par - Add Symbicort 160; 2 puffs BID - rinse and gargle post use.\par - Continue Spiriva 2 inhalations daily.\par - Only use QVar 2 puffs BID when symptoms worsen, but for now discontinue while on prednisone.\par \par 2. Eosinophilic Asthma:\par - Patient made aware that [facility] has been attempting to contact her in regards to re-enrollment for Nucala.  Patient would like in-office injections and NOT home auto-injector. \par - Office will contact patient when her Nucala vials arrive to our office so we can get her started on injection therapy again.\par \par 3. GERD - stable:\par - Continue Pantoprazole 40 mg QD PO before breakfast.\par \par 4. Back Pain:\par - Patient can try OTC Motrin, Tylenol, or Naproxen to see if it helps decrease her pain.  If not, she should follow-up with her PCP.\par \par 5. Anxiety:\par - Patient to contact PCP in regards to recommendations for a therapist.\par \par Pt to schedule visit with Dr. Kelley with full PFT's.\par Pt to call with further questions or concerns.\par Pt. verbalizes understanding.

## 2020-02-20 NOTE — HISTORY OF PRESENT ILLNESS
[TextBox_4] : Ms. Blue is a 34 year old female with a history of allergic eosinophilia, eosinophilic asthma, allergic rhinitis, elevated IgE, severe persistent asthma and vitamin D deficiency presenting today for a follow up visit s/p LLL Pneumonia from 1/3/20. \par \par Her chief complaint is her asthma not being well controlled and back pain x 4 days.\par She is currently on QVar 2 puffs AM &PM, Advair 500 1 inhale BID, and Sprivia 2 inhalations QAM.\par \par She states she continues to feel RODRIGUEZ and hears herself wheezing when going upstairs.  She notes she has been nebulizing with Albuterol every night because of these symptoms.  She uses her rescue inhaler about 3/7 days of the week and the days she does use it she notes she is using it 4 times/day.  Pt. would like to re-initiate Nucala injection therapy - she states she felt better when she was on it. \par \par She also c/o fatigue, but she attributes that to her 7 month old baby that keeps her awake throughout the night.  She also feels as if her back pain is related to her not sleeping well lately. She states she has been more anxious lately, which she feels like worsens her asthma.  She attributes this to her being a mother of 2, a 6 year-old and 7 month old.\par \par She denies cough, chest congestion or chest tightness, nasal or sinus congestion, fever/chills, decreased appetite, or N/V/D.

## 2020-02-20 NOTE — PHYSICAL EXAM
[No Acute Distress] : no acute distress [Normal Oropharynx] : normal oropharynx [Normal Appearance] : normal appearance [No Neck Mass] : no neck mass [Normal Rate/Rhythm] : normal rate/rhythm [Normal S1, S2] : normal s1, s2 [No Murmurs] : no murmurs [No Resp Distress] : no resp distress [No Abnormalities] : no abnormalities [Normal Gait] : normal gait [No Clubbing] : no clubbing [No Cyanosis] : no cyanosis [No Edema] : no edema [FROM] : FROM [Normal Color/ Pigmentation] : normal color/ pigmentation [Oriented x3] : oriented x3 [Normal Affect] : normal affect [TextBox_68] : BL LL Inspiratory & Expiratory wheezes auscultated.

## 2020-02-25 ENCOUNTER — TRANSCRIPTION ENCOUNTER (OUTPATIENT)
Age: 35
End: 2020-02-25

## 2020-03-05 ENCOUNTER — TRANSCRIPTION ENCOUNTER (OUTPATIENT)
Age: 35
End: 2020-03-05

## 2020-03-06 ENCOUNTER — TRANSCRIPTION ENCOUNTER (OUTPATIENT)
Age: 35
End: 2020-03-06

## 2020-04-27 ENCOUNTER — RX RENEWAL (OUTPATIENT)
Age: 35
End: 2020-04-27

## 2020-05-27 ENCOUNTER — APPOINTMENT (OUTPATIENT)
Dept: PULMONOLOGY | Facility: CLINIC | Age: 35
End: 2020-05-27

## 2020-06-03 ENCOUNTER — APPOINTMENT (OUTPATIENT)
Dept: INTERNAL MEDICINE | Facility: CLINIC | Age: 35
End: 2020-06-03

## 2020-06-06 ENCOUNTER — RX RENEWAL (OUTPATIENT)
Age: 35
End: 2020-06-06

## 2020-06-10 ENCOUNTER — APPOINTMENT (OUTPATIENT)
Dept: INTERNAL MEDICINE | Facility: CLINIC | Age: 35
End: 2020-06-10

## 2020-06-10 ENCOUNTER — APPOINTMENT (OUTPATIENT)
Dept: PULMONOLOGY | Facility: CLINIC | Age: 35
End: 2020-06-10
Payer: COMMERCIAL

## 2020-06-10 ENCOUNTER — TRANSCRIPTION ENCOUNTER (OUTPATIENT)
Age: 35
End: 2020-06-10

## 2020-06-10 VITALS
TEMPERATURE: 97.4 F | HEIGHT: 62 IN | OXYGEN SATURATION: 97 % | RESPIRATION RATE: 16 BRPM | DIASTOLIC BLOOD PRESSURE: 100 MMHG | BODY MASS INDEX: 29.81 KG/M2 | HEART RATE: 85 BPM | SYSTOLIC BLOOD PRESSURE: 130 MMHG | WEIGHT: 162 LBS

## 2020-06-10 DIAGNOSIS — J18.9 PNEUMONIA, UNSPECIFIED ORGANISM: ICD-10-CM

## 2020-06-10 PROCEDURE — 96372 THER/PROPH/DIAG INJ SC/IM: CPT

## 2020-06-10 PROCEDURE — 99214 OFFICE O/P EST MOD 30 MIN: CPT | Mod: 25

## 2020-06-10 RX ORDER — METRONIDAZOLE 500 MG/1
500 TABLET ORAL TWICE DAILY
Qty: 10 | Refills: 0 | Status: DISCONTINUED | COMMUNITY
Start: 2018-12-26 | End: 2020-06-10

## 2020-06-10 RX ORDER — PREDNISONE 10 MG/1
10 TABLET ORAL
Qty: 21 | Refills: 0 | Status: DISCONTINUED | COMMUNITY
Start: 2020-02-20 | End: 2020-06-10

## 2020-06-10 RX ORDER — MEPOLIZUMAB 100 MG/ML
100 INJECTION, POWDER, FOR SOLUTION SUBCUTANEOUS
Qty: 0 | Refills: 0 | Status: COMPLETED | OUTPATIENT
Start: 2020-06-10

## 2020-06-10 RX ADMIN — MEPOLIZUMAB 1 MG: 100 INJECTION, POWDER, FOR SOLUTION SUBCUTANEOUS at 00:00

## 2020-06-10 NOTE — ADDENDUM
[FreeTextEntry1] : Documented by Jett Moreno acting as a scribe for Dr. Zac Kelley on 06/10/2020.\par \par All medical record entries made by the Scribe were at my, Dr. Zac Kelley's, direction and personally dictated by me on 06/10/2020. I have reviewed the chart and agree that the record accurately reflects my personal performance of the history, physical exam, assessment and plan. I have also personally directed, reviewed, and agree with the discharge instructions.

## 2020-06-10 NOTE — ASSESSMENT
[FreeTextEntry1] : Ms. Blue is a 34 year old female with a history of asthma / GERD / Allergy / overweight. Her asthma is active and is both IgE mediated and eosinophil mediated (currently off on Nucala), and s/p delivery (7/12/2019) - now asthma flair due to allergies / GERD.\par \par problem 1: severe persistent asthma (flair)\par *climate related - GERD - avoid mold exposure\par -Continue albuterol by the nebulizer QID\par -continue Pulmicort (Budesonide) (0.5) BID by nebulizer, PRN  (gargle and rinse after use)\par -continue to use Ventolin PRN and before exercise\par -continue to use Advair 500 at 1 inhalation BId\par -Continue QVar 80 at 2 inhalations BID\par -Continue Spiriva 2 inhalations QD\par -continue to use Singulair 10 mg before bed\par -all meds needed were renewed\par -Inhaler technique reviewed as well as oral hygiene techniques reviewed with patient. Avoidance of cold air, extremes of temperature, rescue inhaler should be used before exercise. Order of medication reviewed with patient. Recommended use of a cool mist humidifier in the bedroom. \par \par problem 2: Allergic Asthma (Elevated IgE)\par -her elevated IgE makes her a candidate for Xolair\par \par Problem 3; Eosinophilic Asthma\par -restart Nucala - hold due to COVID-19- restart today\par -The safety and efficacy of Nucala was established in three double-blind, randomized, placebo-controlled trials in patients with severe asthma. Compared to a placebo, patients with severe asthma receiving Nucala had fewer exacerbation requiring hospitalization and/or emergency department visits, and a longer time to first exacerbation. In addition, patients with severe asthma receiving Nucala or Fasenra experienced greater reductions in their daily maintenance oral corticosteroid dose, while maintaining asthma control compared with patients receiving placebo. Treatment with Nucala did not result in a significant improvement in lung function, as measured by the volume of air exhaled by patients in one second. The most common side effects include: headache, injection site reactions, back pain, weakness, and fatigue; hypersensitivity reactions can occur within hours or days including swelling of the face, mouth, and tongue, fainting, dizziness, hives, breathing problems, and rash; herpes zoster infections have occurred. The drug is a monoclonal antibody that inhibits interleukin-5 which helps regular eosinophils, a type of white blood cell that contributes to asthma. The over-production of eosinophils can cause inflammation in the lungs, increasing the frequency of asthma attacks. Patients must also take other medications, including high dose inhaled corticosteroids and at least one additional asthma drug\par \par problem 4: allergic rhinitis\par -add Claritin 10 mg QAM \par -continue to use Flonase 1 sniff each nostril BID\par -continue to use Xyzal 5 mg before bed \par -Environmental measures for allergies were encouraged including mattress and pillow cover, air purifier, and environmental controls.\par \par problem 5: low vitamin D\par -continue to use supplemental vitamin D\par -Has been associated with asthma exacerbations and increased allergic symptoms. The goal based on recent information is maintaining levels between 50-70 and low normal is 30. Recommended 50,000 units every two weeks to once a month depending on the level. \par \par problem 6: GERD -(stable/controlled)\par -continue Protonix 40 mg QAM \par -Continue Pepcid 40 mg QHS \par -Rule of 2s: avoid eating too much, eating too late, eating too spicy, eating two hours before bed\par -Things to avoid including overeating, spicy foods, tight clothing, eating within three hours of bed, this list is not all inclusive. \par -For treatment of reflux, possible options discussed including diet control, H2 blockers, PPIs, as well as coating motility agents discussed as treatment options. Timing of meals and proximity of last meal to sleep were discussed. If symptoms persist, a formal gastrointestinal evaluation is needed. \par \par problem 7: overweight\par -Weight loss, exercise, and diet control were discussed and are highly encouraged. Treatment options were given such as, aqua therapy, and contacting a nutritionist. Recommended to use the elliptical, stationary bike, less use of treadmill. \par \par Problem 8: Health Maintenance/COVID19 Precautions \par - Clean your hands often. Wash your hands often with soap and water for at least 20 seconds, especially after blowing your nose, coughing, or sneezing, or having been in a public place.\par - If soap and water are not available, use a hand  that contains at least 60% alcohol.\par - To the extent possible, avoid touching high-touch surfaces in public places - elevator buttons, door handles, handrails, handshaking with people, etc. Use a tissue or your sleeve to cover your hand or finger if you must touch something.\par - Wash your hands after touching surfaces in public places.\par - Avoid touching your face, nose, eyes, etc.\par - Clean and disinfect your home to remove germs: practice routine cleaning of frequently touched surfaces (for example: tables, doorknobs, light switches, handles, desks, toilets, faucets, sinks & cell phones)\par - Avoid crowds, especially in poorly ventilated spaces. Your risk of exposure to respiratory viruses like COVID-19 may increase in crowded, closed-in settings with little air circulation if there are people in the crowd who are sick. All patients are recommended to practice social distancing and stay at least 6 feet away from others. \par - Avoid all non-essential travel including plane trips, and especially avoid embarking on cruise ships.\par -If COVID-19 is spreading in your community, take extra measures to put distance between yourself and other people to further reduce your risk of being exposed to this new virus.\par -Stay home as much as possible.\par - Consider ways of getting food brought to your house through family, social, or commercial networks\par -Be aware that the virus has been known to live in the air up to 3 hours post exposure, cardboard up to 24 hours post exposure, copper up to 4 hours post exposure, steel and plastic up to 2-3 days post exposure. Risk of transmission from these surfaces are affected by many variables.\par COVID-19 precautionary Immune Support Recommendations:\par -OTC Vitamin C 500mg BID \par -OTC Quercetin 250-500mg BID \par -OTC Zinc 75-100mg per day \par -OTC Melatonin 1 or 2mg a night \par -OTC Vitamin D 1-4000mg per day \par -OTC Tonic Water 8oz per day\par -Water 0.5-1 gallon per day\par Asthma and COVID19:\par You need to make sure your asthma is under control. This often requires the use of inhaled corticosteroids (and sometimes oral corticosteroids). Inhaled corticosteroids do not likely reduce your immune system’s ability to fight infections, but oral corticosteroids may. It is important to use the steps above to protect yourself to limit your exposure to any respiratory virus. \par \par problem 9: health maintenance \par -recommended yearly flu shot after October 15 (11/19)\par -recommended strep pneumonia vaccines: Prevnar-13 vaccine, followed by Pneumo vaccine 23 one year following\par -recommended early intervention for URIs\par -recommended regular osteoporosis evaluations\par -recommended early dermatological evaluations\par -recommended after the age of 50 to the age of 70, colonoscopy every 5 years \par \par Patient may follow up in 2 months with SPI and NiOx\par She is encouraged to call with any changes, concerns, or questions

## 2020-06-10 NOTE — PHYSICAL EXAM
[Normal Conjunctiva] : the conjunctiva exhibited no abnormalities [Eyelids - No Xanthelasma] : the eyelids demonstrated no xanthelasmas [Normal Oropharynx] : normal oropharynx [II] : II [Neck Appearance] : the appearance of the neck was normal [Jugular Venous Distention Increased] : there was no jugular-venous distention [Neck Cervical Mass (___cm)] : no neck mass was observed [Thyroid Diffuse Enlargement] : the thyroid was not enlarged [Thyroid Nodule] : there were no palpable thyroid nodules [Heart Sounds] : normal S1 and S2 [Heart Rate And Rhythm] : heart rate and rhythm were normal [Murmurs] : no murmurs present [Respiration, Rhythm And Depth] : normal respiratory rhythm and effort [Auscultation Breath Sounds / Voice Sounds] : lungs were clear to auscultation bilaterally [Exaggerated Use Of Accessory Muscles For Inspiration] : no accessory muscle use [Abdomen Soft] : soft [Abdomen Tenderness] : non-tender [Abdomen Mass (___ Cm)] : no abdominal mass palpated [Abnormal Walk] : normal gait [Gait - Sufficient For Exercise Testing] : the gait was sufficient for exercise testing [Cyanosis, Localized] : no localized cyanosis [Nail Clubbing] : no clubbing of the fingernails [Petechial Hemorrhages (___cm)] : no petechial hemorrhages [] : no rash [Skin Color & Pigmentation] : normal skin color and pigmentation [Skin Lesions] : no skin lesions [No Venous Stasis] : no venous stasis [No Skin Ulcers] : no skin ulcer [Deep Tendon Reflexes (DTR)] : deep tendon reflexes were 2+ and symmetric [No Xanthoma] : no  xanthoma was observed [Sensation] : the sensory exam was normal to light touch and pinprick [No Focal Deficits] : no focal deficits [Impaired Insight] : insight and judgment were intact [Oriented To Time, Place, And Person] : oriented to person, place, and time [Affect] : the affect was normal [General Appearance - Well Developed] : well developed [Normal Appearance] : normal appearance [Well Groomed] : well groomed [General Appearance - Well Nourished] : well nourished [No Deformities] : no deformities [General Appearance - In No Acute Distress] : no acute distress [FreeTextEntry1] : I:E ratio 1:3; mild expiratory wheeze

## 2020-06-10 NOTE — HISTORY OF PRESENT ILLNESS
[FreeTextEntry1] : Ms. Blue is a 34 year old female with a history of allergic eosinophilia, eosinophilic asthma, allergic rhinitis, elevated IgE, severe persistent asthma and vitamin D deficiency presenting today for a follow up visit. Her chief complaint is \par -she thinks since February she has been okay but allergies are affecting her from this morning \par -energy level is 7-8/10\par -she is sleeping well, roughly 7 hours, wakes up at least once\par -she gained weight since she has been home ~10 lbs\par -she started walking on the treadmill\par -\par -today she denies any headaches, nausea, vomiting, fever, chills, sweats, chest pain, chest pressure, diarrhea, constipation, dysphagia, dizziness, leg swelling, leg pain, itchy eyes, itchy ears, heartburn, reflux, or sour taste in the mouth.

## 2020-07-08 ENCOUNTER — APPOINTMENT (OUTPATIENT)
Dept: PULMONOLOGY | Facility: CLINIC | Age: 35
End: 2020-07-08

## 2020-07-16 NOTE — PRE-ANESTHESIA EVALUATION ADULT - SPO2 (%)
3 h OGTT results 79, 140,125,91    OB/GYN prenatal visit    S: 24 y o  [id number] here for PN visit  She has no obstetric complaints, including pelvic pain, contractions, vaginal bleeding, loss of fluid, or decreased fetal movement  COVID19 Screen:    Cough: no  Fever: no  Shortness of breath: no  Known exposures to COVID-19, or international travel:no    O:  Vitals:    07/16/20 0951   BP: 120/60   Pulse: 102   Temp: 98 4 °F (36 9 °C)       Gen: no acute distress, nonlabored breathing  Fundal Height (cm): 34 cm  Fetal Heart Rate: 142    A/P:    Abnormal glucola  - Patient 3h OGTT results verbally; fasting 79, 1 hour 140, 2 hour 125, 3 hour 91   - Se had the test in a private lab and sent the result by fax but we did not get yet     - Will send again to our fax     Short interval pregnancy  She is doing well today  Decline contraction, LOF and vaginal bleeding    34 weeks gestation of pregnancy  - She is doing well  - Recommend regular use of prenatal vitamins  - Labor precautions discussed  - Contraception: Permanent sterilization, [id number] signed at earlier visit  - Recommend good hydration and regular diet      - GBS collection next visit     36 weeks: collect GBS/PCN allergy, check fetal position, contraception and birth plan, Keith Kong MD  1/77/8574  42:90 AM
98

## 2020-08-11 ENCOUNTER — APPOINTMENT (OUTPATIENT)
Dept: PULMONOLOGY | Facility: CLINIC | Age: 35
End: 2020-08-11
Payer: COMMERCIAL

## 2020-08-11 VITALS
DIASTOLIC BLOOD PRESSURE: 78 MMHG | OXYGEN SATURATION: 98 % | SYSTOLIC BLOOD PRESSURE: 122 MMHG | WEIGHT: 162 LBS | TEMPERATURE: 97.5 F | HEART RATE: 85 BPM | HEIGHT: 62 IN | BODY MASS INDEX: 29.81 KG/M2 | RESPIRATION RATE: 16 BRPM

## 2020-08-11 PROCEDURE — 96401 CHEMO ANTI-NEOPL SQ/IM: CPT

## 2020-08-11 RX ORDER — MEPOLIZUMAB 100 MG/ML
100 INJECTION, POWDER, FOR SOLUTION SUBCUTANEOUS
Qty: 0 | Refills: 0 | Status: COMPLETED | OUTPATIENT
Start: 2020-07-08

## 2020-08-20 ENCOUNTER — TRANSCRIPTION ENCOUNTER (OUTPATIENT)
Age: 35
End: 2020-08-20

## 2020-08-26 ENCOUNTER — TRANSCRIPTION ENCOUNTER (OUTPATIENT)
Age: 35
End: 2020-08-26

## 2020-08-28 ENCOUNTER — APPOINTMENT (OUTPATIENT)
Dept: INTERNAL MEDICINE | Facility: CLINIC | Age: 35
End: 2020-08-28
Payer: COMMERCIAL

## 2020-08-28 VITALS
DIASTOLIC BLOOD PRESSURE: 80 MMHG | BODY MASS INDEX: 31.1 KG/M2 | SYSTOLIC BLOOD PRESSURE: 120 MMHG | HEART RATE: 88 BPM | HEIGHT: 62 IN | OXYGEN SATURATION: 98 % | WEIGHT: 169 LBS

## 2020-08-28 LAB
ALBUMIN SERPL ELPH-MCNC: 4.7 G/DL
ALP BLD-CCNC: 92 U/L
ALT SERPL-CCNC: 17 U/L
ANION GAP SERPL CALC-SCNC: 14 MMOL/L
AST SERPL-CCNC: 18 U/L
BASOPHILS # BLD AUTO: 0.07 K/UL
BASOPHILS NFR BLD AUTO: 1.1 %
BILIRUB SERPL-MCNC: 0.6 MG/DL
BUN SERPL-MCNC: 13 MG/DL
CALCIUM SERPL-MCNC: 9.8 MG/DL
CHLORIDE SERPL-SCNC: 97 MMOL/L
CHOLEST SERPL-MCNC: 222 MG/DL
CHOLEST/HDLC SERPL: 3.8 RATIO
CO2 SERPL-SCNC: 24 MMOL/L
CREAT SERPL-MCNC: 0.87 MG/DL
EOSINOPHIL # BLD AUTO: 0.22 K/UL
EOSINOPHIL NFR BLD AUTO: 3.3 %
GLUCOSE SERPL-MCNC: 100 MG/DL
HCT VFR BLD CALC: 45.9 %
HDLC SERPL-MCNC: 59 MG/DL
HGB BLD-MCNC: 14.4 G/DL
IMM GRANULOCYTES NFR BLD AUTO: 0.3 %
LDLC SERPL CALC-MCNC: 134 MG/DL
LYMPHOCYTES # BLD AUTO: 1.78 K/UL
LYMPHOCYTES NFR BLD AUTO: 26.7 %
MAN DIFF?: NORMAL
MCHC RBC-ENTMCNC: 28.9 PG
MCHC RBC-ENTMCNC: 31.4 GM/DL
MCV RBC AUTO: 92 FL
MONOCYTES # BLD AUTO: 0.58 K/UL
MONOCYTES NFR BLD AUTO: 8.7 %
NEUTROPHILS # BLD AUTO: 3.99 K/UL
NEUTROPHILS NFR BLD AUTO: 59.9 %
PLATELET # BLD AUTO: 326 K/UL
POTASSIUM SERPL-SCNC: 4.4 MMOL/L
PROT SERPL-MCNC: 7.7 G/DL
RBC # BLD: 4.99 M/UL
RBC # FLD: 13.4 %
SODIUM SERPL-SCNC: 135 MMOL/L
TRIGL SERPL-MCNC: 147 MG/DL
WBC # FLD AUTO: 6.66 K/UL

## 2020-08-28 PROCEDURE — 99212 OFFICE O/P EST SF 10 MIN: CPT

## 2020-08-28 PROCEDURE — G0442 ANNUAL ALCOHOL SCREEN 15 MIN: CPT | Mod: 59

## 2020-08-28 NOTE — ASSESSMENT
[FreeTextEntry1] : 34F with history as above here for preemployment form/physical\par \par -form filled out with exception of quant gold; results pending. Patient will rtc Monday and will complete form. \par -she requests blood work be completed today; no bloodwork since 2018 she would like to know if she has preDM\par \par Patient should rtc for CPE\par \par

## 2020-08-28 NOTE — REVIEW OF SYSTEMS
[Fever] : no fever [Chills] : no chills [Chest Pain] : no chest pain [Abdominal Pain] : no abdominal pain [Shortness Of Breath] : no shortness of breath [Nausea] : no nausea [Vomiting] : no vomiting [Diarrhea] : diarrhea [Back Pain] : no back pain [Negative] : Heme/Lymph

## 2020-08-28 NOTE — HISTORY OF PRESENT ILLNESS
[FreeTextEntry1] : Form filled out for employment [de-identified] : 34F with HTN on olmesartan, eosinophilic asthma with history of gestational DM/preDM who comes to clinic to complete preemployment form. She feels well, has no changes in health or medications. She denies CP, SOB, fever, chills, abdominal pain, nausea, vomiting, diarrhea, leg swelling.

## 2020-08-28 NOTE — PHYSICAL EXAM
[Normal Sclera/Conjunctiva] : normal sclera/conjunctiva [PERRL] : pupils equal round and reactive to light [No Lymphadenopathy] : no lymphadenopathy [Supple] : supple [Pedal Pulses Present] : the pedal pulses are present [No Edema] : there was no peripheral edema [No Extremity Clubbing/Cyanosis] : no extremity clubbing/cyanosis [Normal] : affect was normal and insight and judgment were intact

## 2020-08-31 LAB
ESTIMATED AVERAGE GLUCOSE: 131 MG/DL
HBA1C MFR BLD HPLC: 6.2 %
M TB IFN-G BLD-IMP: NEGATIVE
QUANTIFERON TB PLUS MITOGEN MINUS NIL: 7.32 IU/ML
QUANTIFERON TB PLUS NIL: 0.08 IU/ML
QUANTIFERON TB PLUS TB1 MINUS NIL: 0.01 IU/ML
QUANTIFERON TB PLUS TB2 MINUS NIL: -0.02 IU/ML

## 2020-09-08 ENCOUNTER — APPOINTMENT (OUTPATIENT)
Dept: PULMONOLOGY | Facility: CLINIC | Age: 35
End: 2020-09-08
Payer: COMMERCIAL

## 2020-09-08 VITALS
SYSTOLIC BLOOD PRESSURE: 140 MMHG | TEMPERATURE: 97.3 F | WEIGHT: 166 LBS | HEIGHT: 62 IN | BODY MASS INDEX: 30.55 KG/M2 | OXYGEN SATURATION: 98 % | RESPIRATION RATE: 17 BRPM | HEART RATE: 93 BPM | DIASTOLIC BLOOD PRESSURE: 120 MMHG

## 2020-09-08 PROCEDURE — 96372 THER/PROPH/DIAG INJ SC/IM: CPT

## 2020-09-08 RX ORDER — MEPOLIZUMAB 100 MG/ML
100 INJECTION, POWDER, FOR SOLUTION SUBCUTANEOUS
Qty: 0 | Refills: 0 | Status: COMPLETED | OUTPATIENT
Start: 2020-09-08

## 2020-09-10 ENCOUNTER — RX RENEWAL (OUTPATIENT)
Age: 35
End: 2020-09-10

## 2020-09-15 ENCOUNTER — APPOINTMENT (OUTPATIENT)
Dept: PULMONOLOGY | Facility: CLINIC | Age: 35
End: 2020-09-15
Payer: COMMERCIAL

## 2020-09-15 VITALS
DIASTOLIC BLOOD PRESSURE: 80 MMHG | SYSTOLIC BLOOD PRESSURE: 122 MMHG | HEART RATE: 86 BPM | WEIGHT: 165 LBS | RESPIRATION RATE: 17 BRPM | OXYGEN SATURATION: 98 % | HEIGHT: 61 IN | BODY MASS INDEX: 31.15 KG/M2 | TEMPERATURE: 97.9 F

## 2020-09-15 DIAGNOSIS — J45.909 UNSPECIFIED ASTHMA, UNCOMPLICATED: ICD-10-CM

## 2020-09-15 DIAGNOSIS — J45.901 UNSPECIFIED ASTHMA WITH (ACUTE) EXACERBATION: ICD-10-CM

## 2020-09-15 PROCEDURE — 95012 NITRIC OXIDE EXP GAS DETER: CPT

## 2020-09-15 PROCEDURE — 99214 OFFICE O/P EST MOD 30 MIN: CPT | Mod: 25

## 2020-09-15 NOTE — HISTORY OF PRESENT ILLNESS
[FreeTextEntry1] : Ms. Blue is a 34 year old female with a history of allergic eosinophilia, eosinophilic asthma, allergic rhinitis, elevated IgE, severe persistent asthma and vitamin D deficiency presenting today for a follow up visit. Her chief complaint is weight gain over the pandemic.\par -she notes she is feeling well\par -she notes her energy level has improved\par -she reports she exercises by walking\par -she states she is sleeping well and getting enough sleep, with 7 hours of uninterrupted sleep nightly\par -she reports she gained weight over the pandemic, and is now losing weight again. She is walking, not snacking, and drinking more water\par -she is not taking her GERD medication as she doesn’t need it\par -she states is only using symbicort, allergy medication, and Nucala\par -she reports she has been taking the Covid vitamins daily\par -she denies any chest pain, chest pressure, diarrhea, constipation, dysphagia, dizziness, sour taste in the mouth, heartburn, reflux, myalgias or arthralgias.

## 2020-09-15 NOTE — PROCEDURE
[FreeTextEntry1] : FENO was 28; a normal value being less than 25\par Fractional exhaled nitric oxide (FENO) is regarded as a simple, noninvasive method for assessing eosinophilic airway inflammation. Produced by a variety of cells within the lung, nitric oxide (NO) concentrations are generally low in healthy individuals. However, high concentrations of NO appear to be involved in nonspecific host defense mechanisms and chronic inflammatory diseases such as asthma. The American Thor[employer] Society (ATS) therefore has recommended using FENO to aid in the diagnosis and monitoring of eosinophilic airway inflammation and asthma, and for identifying steroid responsive individuals whose chronic respiratory symptoms may be caused by airway inflammation.

## 2020-09-15 NOTE — ADDENDUM
[FreeTextEntry1] : Documented by Adeel Vargas acting as a scribe for Dr. Zac Kelley on 09/15/2020.\par \par All medical record entries made by the Scribe were at my, Dr. Zac Kelley's, direction and personally dictated by me on 09/15/2020. I have reviewed the chart and agree that the record accurately reflects my personal performance of the history, physical exam, assessment and plan. I have also personally directed, reviewed, and agree with the discharge instructions.

## 2020-09-15 NOTE — PHYSICAL EXAM
[Normal Conjunctiva] : the conjunctiva exhibited no abnormalities [Normal Oropharynx] : normal oropharynx [Eyelids - No Xanthelasma] : the eyelids demonstrated no xanthelasmas [Neck Appearance] : the appearance of the neck was normal [Neck Cervical Mass (___cm)] : no neck mass was observed [Jugular Venous Distention Increased] : there was no jugular-venous distention [Thyroid Nodule] : there were no palpable thyroid nodules [Thyroid Diffuse Enlargement] : the thyroid was not enlarged [Heart Rate And Rhythm] : heart rate and rhythm were normal [Heart Sounds] : normal S1 and S2 [Murmurs] : no murmurs present [Auscultation Breath Sounds / Voice Sounds] : lungs were clear to auscultation bilaterally [Respiration, Rhythm And Depth] : normal respiratory rhythm and effort [Exaggerated Use Of Accessory Muscles For Inspiration] : no accessory muscle use [Abdomen Soft] : soft [Abdomen Mass (___ Cm)] : no abdominal mass palpated [Abdomen Tenderness] : non-tender [Nail Clubbing] : no clubbing of the fingernails [Abnormal Walk] : normal gait [Cyanosis, Localized] : no localized cyanosis [Gait - Sufficient For Exercise Testing] : the gait was sufficient for exercise testing [Petechial Hemorrhages (___cm)] : no petechial hemorrhages [Skin Color & Pigmentation] : normal skin color and pigmentation [No Venous Stasis] : no venous stasis [Skin Lesions] : no skin lesions [] : no rash [No Skin Ulcers] : no skin ulcer [No Xanthoma] : no  xanthoma was observed [Deep Tendon Reflexes (DTR)] : deep tendon reflexes were 2+ and symmetric [Sensation] : the sensory exam was normal to light touch and pinprick [No Focal Deficits] : no focal deficits [Impaired Insight] : insight and judgment were intact [Oriented To Time, Place, And Person] : oriented to person, place, and time [Affect] : the affect was normal [General Appearance - Well Developed] : well developed [Normal Appearance] : normal appearance [No Deformities] : no deformities [Well Groomed] : well groomed [General Appearance - Well Nourished] : well nourished [General Appearance - In No Acute Distress] : no acute distress [III] : III [FreeTextEntry1] : I:E ratio 1:3; clear

## 2020-09-15 NOTE — REVIEW OF SYSTEMS
[Negative] : Psychiatric [Recent Wt Loss (___ Lbs)] : ~T recent [unfilled] lb weight loss [Recent Wt Gain (___ Lbs)] : ~T recent [unfilled] lb weight gain [GERD] : no gerd [Chest Discomfort] : no chest discomfort [Dysphagia] : no dysphagia [Diarrhea] : no diarrhea [Constipation] : no constipation

## 2020-09-15 NOTE — ASSESSMENT
[FreeTextEntry1] : Ms. Blue is a 34 year old female with a history of asthma / GERD / Allergy / overweight. Her asthma is active and is both IgE mediated and eosinophil mediated (currently off on Nucala), and s/p delivery (7/12/2019) - now improved on Nucala.\par \par problem 1: severe persistent asthma (controlled)\par *climate related - GERD - avoid mold exposure\par -Continue albuterol by the nebulizer QID\par -continue Pulmicort (Budesonide) (0.5) BID by nebulizer, PRN  (gargle and rinse after use)\par -continue to use Ventolin PRN and before exercise\par -continue to use Advair 500 at 1 inhalation BId\par -Continue QVar 80 at 2 inhalations BID\par -Continue Spiriva 2 inhalations QD\par -continue to use Singulair 10 mg before bed\par -Inhaler technique reviewed as well as oral hygiene techniques reviewed with patient. Avoidance of cold air, extremes of temperature, rescue inhaler should be used before exercise. Order of medication reviewed with patient. Recommended use of a cool mist humidifier in the bedroom. \par \par problem 2: Allergic Asthma (Elevated IgE)\par -her elevated IgE makes her a candidate for Xolair\par \par Problem 3; Eosinophilic Asthma\par -restart Nucala - hold due to COVID-19- restarted 6/2020\par -The safety and efficacy of Nucala was established in three double-blind, randomized, placebo-controlled trials in patients with severe asthma. Compared to a placebo, patients with severe asthma receiving Nucala had fewer exacerbation requiring hospitalization and/or emergency department visits, and a longer time to first exacerbation. In addition, patients with severe asthma receiving Nucala or Fasenra experienced greater reductions in their daily maintenance oral corticosteroid dose, while maintaining asthma control compared with patients receiving placebo. Treatment with Nucala did not result in a significant improvement in lung function, as measured by the volume of air exhaled by patients in one second. The most common side effects include: headache, injection site reactions, back pain, weakness, and fatigue; hypersensitivity reactions can occur within hours or days including swelling of the face, mouth, and tongue, fainting, dizziness, hives, breathing problems, and rash; herpes zoster infections have occurred. The drug is a monoclonal antibody that inhibits interleukin-5 which helps regular eosinophils, a type of white blood cell that contributes to asthma. The over-production of eosinophils can cause inflammation in the lungs, increasing the frequency of asthma attacks. Patients must also take other medications, including high dose inhaled corticosteroids and at least one additional asthma drug\par \par problem 4: allergic rhinitis\par -add Claritin 10 mg QAM \par -continue to use Flonase 1 sniff each nostril BID\par -continue to use Xyzal 5 mg before bed \par -Environmental measures for allergies were encouraged including mattress and pillow cover, air purifier, and environmental controls.\par \par problem 5: low vitamin D\par -continue to use supplemental vitamin D\par -Has been associated with asthma exacerbations and increased allergic symptoms. The goal based on recent information is maintaining levels between 50-70 and low normal is 30. Recommended 50,000 units every two weeks to once a month depending on the level. \par \par problem 6: GERD -(stable/controlled)\par -continue Protonix 40 mg QAM \par -Continue Pepcid 40 mg QHS \par -Rule of 2s: avoid eating too much, eating too late, eating too spicy, eating two hours before bed\par -Things to avoid including overeating, spicy foods, tight clothing, eating within three hours of bed, this list is not all inclusive. \par -For treatment of reflux, possible options discussed including diet control, H2 blockers, PPIs, as well as coating motility agents discussed as treatment options. Timing of meals and proximity of last meal to sleep were discussed. If symptoms persist, a formal gastrointestinal evaluation is needed. \par \par problem 7: overweight - in progress\par -Weight loss, exercise, and diet control were discussed and are highly encouraged. Treatment options were given such as, aqua therapy, and contacting a nutritionist. Recommended to use the elliptical, stationary bike, less use of treadmill. \par \par Problem 8: Health Maintenance/COVID19 Precautions \par - Clean your hands often. Wash your hands often with soap and water for at least 20 seconds, especially after blowing your nose, coughing, or sneezing, or having been in a public place.\par - If soap and water are not available, use a hand  that contains at least 60% alcohol.\par - To the extent possible, avoid touching high-touch surfaces in public places - elevator buttons, door handles, handrails, handshaking with people, etc. Use a tissue or your sleeve to cover your hand or finger if you must touch something.\par - Wash your hands after touching surfaces in public places.\par - Avoid touching your face, nose, eyes, etc.\par - Clean and disinfect your home to remove germs: practice routine cleaning of frequently touched surfaces (for example: tables, doorknobs, light switches, handles, desks, toilets, faucets, sinks & cell phones)\par - Avoid crowds, especially in poorly ventilated spaces. Your risk of exposure to respiratory viruses like COVID-19 may increase in crowded, closed-in settings with little air circulation if there are people in the crowd who are sick. All patients are recommended to practice social distancing and stay at least 6 feet away from others. \par - Avoid all non-essential travel including plane trips, and especially avoid embarking on cruise ships.\par -If COVID-19 is spreading in your community, take extra measures to put distance between yourself and other people to further reduce your risk of being exposed to this new virus.\par -Stay home as much as possible.\par - Consider ways of getting food brought to your house through family, social, or commercial networks\par -Be aware that the virus has been known to live in the air up to 3 hours post exposure, cardboard up to 24 hours post exposure, copper up to 4 hours post exposure, steel and plastic up to 2-3 days post exposure. Risk of transmission from these surfaces are affected by many variables.\par COVID-19 precautionary Immune Support Recommendations:\par -OTC Vitamin C 500mg BID \par -OTC Quercetin 250-500mg BID \par -OTC Zinc 75-100mg per day \par -OTC Melatonin 1 or 2mg a night \par -OTC Vitamin D 1-4000mg per day \par -OTC Tonic Water 8oz per day\par -Water 0.5-1 gallon per day\par Asthma and COVID19:\par You need to make sure your asthma is under control. This often requires the use of inhaled corticosteroids (and sometimes oral corticosteroids). Inhaled corticosteroids do not likely reduce your immune system’s ability to fight infections, but oral corticosteroids may. It is important to use the steps above to protect yourself to limit your exposure to any respiratory virus. \par \par problem 9: health maintenance \par -recommended yearly flu shot after October 15 (9/15/2020)\par -recommended strep pneumonia vaccines: Prevnar-13 vaccine, followed by Pneumo vaccine 23 one year following\par -recommended early intervention for URIs\par -recommended regular osteoporosis evaluations\par -recommended early dermatological evaluations\par -recommended after the age of 50 to the age of 70, colonoscopy every 5 years \par \par Patient may follow up in 2 months with SPI and NiOx\par She is encouraged to call with any changes, concerns, or questions

## 2020-09-26 ENCOUNTER — RX RENEWAL (OUTPATIENT)
Age: 35
End: 2020-09-26

## 2020-10-06 ENCOUNTER — APPOINTMENT (OUTPATIENT)
Dept: PULMONOLOGY | Facility: CLINIC | Age: 35
End: 2020-10-06
Payer: COMMERCIAL

## 2020-10-06 VITALS
TEMPERATURE: 97.8 F | SYSTOLIC BLOOD PRESSURE: 122 MMHG | DIASTOLIC BLOOD PRESSURE: 84 MMHG | RESPIRATION RATE: 14 BRPM | BODY MASS INDEX: 31.15 KG/M2 | HEART RATE: 89 BPM | WEIGHT: 165 LBS | OXYGEN SATURATION: 98 % | HEIGHT: 61 IN

## 2020-10-06 PROCEDURE — 96401 CHEMO ANTI-NEOPL SQ/IM: CPT

## 2020-10-06 RX ORDER — MEPOLIZUMAB 100 MG/ML
100 INJECTION, POWDER, FOR SOLUTION SUBCUTANEOUS
Qty: 0 | Refills: 0 | Status: COMPLETED | OUTPATIENT
Start: 2020-10-06

## 2020-10-23 NOTE — PROGRESS NOTE ADULT - PROBLEM SELECTOR PROBLEM 2
Pt sitting in bed, calm. 1:1 safety sitter at bedside. Assessment completed. Pt complained of itchiness to scalp. No-rinse shower cap offered with assistance of CNA sitter. Lice found by CNA sitter. Pt placed on contact isolation. Specimen sample sent to lab. Will notify [facility].   Type 2 diabetes mellitus without complication, with long-term current use of insulin

## 2020-11-11 ENCOUNTER — APPOINTMENT (OUTPATIENT)
Dept: PULMONOLOGY | Facility: CLINIC | Age: 35
End: 2020-11-11
Payer: COMMERCIAL

## 2020-11-11 VITALS
SYSTOLIC BLOOD PRESSURE: 128 MMHG | DIASTOLIC BLOOD PRESSURE: 66 MMHG | RESPIRATION RATE: 16 BRPM | HEART RATE: 88 BPM | OXYGEN SATURATION: 98 %

## 2020-11-11 PROCEDURE — 96372 THER/PROPH/DIAG INJ SC/IM: CPT

## 2020-11-11 RX ORDER — MEPOLIZUMAB 100 MG/ML
100 INJECTION, POWDER, FOR SOLUTION SUBCUTANEOUS
Qty: 0 | Refills: 0 | Status: COMPLETED | OUTPATIENT
Start: 2020-11-11

## 2020-12-02 ENCOUNTER — APPOINTMENT (OUTPATIENT)
Dept: PULMONOLOGY | Facility: CLINIC | Age: 35
End: 2020-12-02

## 2021-02-16 ENCOUNTER — APPOINTMENT (OUTPATIENT)
Dept: PULMONOLOGY | Facility: CLINIC | Age: 36
End: 2021-02-16
Payer: COMMERCIAL

## 2021-02-16 VITALS
DIASTOLIC BLOOD PRESSURE: 92 MMHG | RESPIRATION RATE: 17 BRPM | HEART RATE: 95 BPM | SYSTOLIC BLOOD PRESSURE: 130 MMHG | WEIGHT: 166 LBS | OXYGEN SATURATION: 98 % | HEIGHT: 61 IN | TEMPERATURE: 97.6 F | BODY MASS INDEX: 31.34 KG/M2

## 2021-02-16 PROCEDURE — 99072 ADDL SUPL MATRL&STAF TM PHE: CPT

## 2021-02-16 PROCEDURE — 96372 THER/PROPH/DIAG INJ SC/IM: CPT

## 2021-02-16 RX ORDER — MEPOLIZUMAB 100 MG/ML
100 INJECTION, POWDER, FOR SOLUTION SUBCUTANEOUS
Qty: 0 | Refills: 0 | Status: COMPLETED | OUTPATIENT
Start: 2021-02-16

## 2021-02-22 ENCOUNTER — TRANSCRIPTION ENCOUNTER (OUTPATIENT)
Age: 36
End: 2021-02-22

## 2021-03-16 ENCOUNTER — APPOINTMENT (OUTPATIENT)
Dept: PULMONOLOGY | Facility: CLINIC | Age: 36
End: 2021-03-16

## 2021-04-12 ENCOUNTER — APPOINTMENT (OUTPATIENT)
Dept: INTERNAL MEDICINE | Facility: CLINIC | Age: 36
End: 2021-04-12

## 2021-04-13 ENCOUNTER — RX RENEWAL (OUTPATIENT)
Age: 36
End: 2021-04-13

## 2021-04-26 ENCOUNTER — TRANSCRIPTION ENCOUNTER (OUTPATIENT)
Age: 36
End: 2021-04-26

## 2021-05-12 ENCOUNTER — APPOINTMENT (OUTPATIENT)
Dept: PULMONOLOGY | Facility: CLINIC | Age: 36
End: 2021-05-12
Payer: COMMERCIAL

## 2021-05-12 VITALS
SYSTOLIC BLOOD PRESSURE: 130 MMHG | OXYGEN SATURATION: 97 % | WEIGHT: 164 LBS | BODY MASS INDEX: 30.96 KG/M2 | HEIGHT: 61 IN | DIASTOLIC BLOOD PRESSURE: 84 MMHG | TEMPERATURE: 97.7 F | RESPIRATION RATE: 17 BRPM | HEART RATE: 93 BPM

## 2021-05-12 PROCEDURE — 96372 THER/PROPH/DIAG INJ SC/IM: CPT

## 2021-05-12 PROCEDURE — 99072 ADDL SUPL MATRL&STAF TM PHE: CPT

## 2021-05-12 RX ORDER — MEPOLIZUMAB 100 MG/ML
100 INJECTION, POWDER, FOR SOLUTION SUBCUTANEOUS
Qty: 0 | Refills: 0 | Status: COMPLETED | OUTPATIENT
Start: 2021-05-12

## 2021-06-10 ENCOUNTER — APPOINTMENT (OUTPATIENT)
Dept: PULMONOLOGY | Facility: CLINIC | Age: 36
End: 2021-06-10
Payer: COMMERCIAL

## 2021-06-10 VITALS
SYSTOLIC BLOOD PRESSURE: 130 MMHG | DIASTOLIC BLOOD PRESSURE: 98 MMHG | OXYGEN SATURATION: 98 % | RESPIRATION RATE: 17 BRPM | HEIGHT: 61 IN | WEIGHT: 164 LBS | HEART RATE: 84 BPM | TEMPERATURE: 97.2 F | BODY MASS INDEX: 30.96 KG/M2

## 2021-06-10 PROCEDURE — 99072 ADDL SUPL MATRL&STAF TM PHE: CPT

## 2021-06-10 PROCEDURE — 96372 THER/PROPH/DIAG INJ SC/IM: CPT

## 2021-06-10 RX ORDER — MEPOLIZUMAB 100 MG/ML
100 INJECTION, POWDER, FOR SOLUTION SUBCUTANEOUS
Qty: 0 | Refills: 0 | Status: COMPLETED | OUTPATIENT
Start: 2021-06-10

## 2021-06-18 ENCOUNTER — RX RENEWAL (OUTPATIENT)
Age: 36
End: 2021-06-18

## 2021-06-28 ENCOUNTER — APPOINTMENT (OUTPATIENT)
Dept: INTERNAL MEDICINE | Facility: CLINIC | Age: 36
End: 2021-06-28
Payer: COMMERCIAL

## 2021-06-28 ENCOUNTER — NON-APPOINTMENT (OUTPATIENT)
Age: 36
End: 2021-06-28

## 2021-06-28 VITALS
DIASTOLIC BLOOD PRESSURE: 90 MMHG | WEIGHT: 165 LBS | HEIGHT: 61 IN | SYSTOLIC BLOOD PRESSURE: 130 MMHG | OXYGEN SATURATION: 99 % | HEART RATE: 94 BPM | BODY MASS INDEX: 31.15 KG/M2

## 2021-06-28 VITALS — DIASTOLIC BLOOD PRESSURE: 80 MMHG | SYSTOLIC BLOOD PRESSURE: 120 MMHG

## 2021-06-28 DIAGNOSIS — Z87.39 PERSONAL HISTORY OF OTHER DISEASES OF THE MUSCULOSKELETAL SYSTEM AND CONNECTIVE TISSUE: ICD-10-CM

## 2021-06-28 DIAGNOSIS — O10.919 UNSPECIFIED PRE-EXISTING HYPERTENSION COMPLICATING PREGNANCY, UNSPECIFIED TRIMESTER: ICD-10-CM

## 2021-06-28 DIAGNOSIS — Z87.898 PERSONAL HISTORY OF OTHER SPECIFIED CONDITIONS: ICD-10-CM

## 2021-06-28 DIAGNOSIS — Z87.09 PERSONAL HISTORY OF OTHER DISEASES OF THE RESPIRATORY SYSTEM: ICD-10-CM

## 2021-06-28 DIAGNOSIS — N91.0 PRIMARY AMENORRHEA: ICD-10-CM

## 2021-06-28 DIAGNOSIS — Z87.19 PERSONAL HISTORY OF OTHER DISEASES OF THE DIGESTIVE SYSTEM: ICD-10-CM

## 2021-06-28 DIAGNOSIS — G47.62 SLEEP RELATED LEG CRAMPS: ICD-10-CM

## 2021-06-28 DIAGNOSIS — Z71.89 OTHER SPECIFIED COUNSELING: ICD-10-CM

## 2021-06-28 DIAGNOSIS — Z87.42 PERSONAL HISTORY OF OTHER DISEASES OF THE FEMALE GENITAL TRACT: ICD-10-CM

## 2021-06-28 DIAGNOSIS — Z02.89 ENCOUNTER FOR OTHER ADMINISTRATIVE EXAMINATIONS: ICD-10-CM

## 2021-06-28 DIAGNOSIS — O99.810 ABNORMAL GLUCOSE COMPLICATING PREGNANCY: ICD-10-CM

## 2021-06-28 DIAGNOSIS — R73.09 OTHER ABNORMAL GLUCOSE: ICD-10-CM

## 2021-06-28 DIAGNOSIS — Z34.90 ENCOUNTER FOR SUPERVISION OF NORMAL PREGNANCY, UNSPECIFIED, UNSPECIFIED TRIMESTER: ICD-10-CM

## 2021-06-28 DIAGNOSIS — Z01.812 ENCOUNTER FOR PREPROCEDURAL LABORATORY EXAMINATION: ICD-10-CM

## 2021-06-28 DIAGNOSIS — O26.872 CERVICAL SHORTENING, SECOND TRIMESTER: ICD-10-CM

## 2021-06-28 PROCEDURE — 99395 PREV VISIT EST AGE 18-39: CPT

## 2021-06-28 PROCEDURE — 99072 ADDL SUPL MATRL&STAF TM PHE: CPT

## 2021-06-28 RX ORDER — PROGESTERONE 200 MG/1
200 CAPSULE ORAL
Qty: 30 | Refills: 4 | Status: DISCONTINUED | COMMUNITY
Start: 2019-03-22 | End: 2021-06-28

## 2021-06-28 RX ORDER — TERCONAZOLE 4 MG/G
0.4 CREAM VAGINAL
Qty: 1 | Refills: 0 | Status: DISCONTINUED | COMMUNITY
Start: 2018-12-26 | End: 2021-06-28

## 2021-06-28 RX ORDER — MULTIVITAMIN
TABLET ORAL
Refills: 0 | Status: ACTIVE | COMMUNITY

## 2021-06-28 RX ORDER — PNV/FERROUS SULFATE/FOLIC ACID 27-<0.5MG
TABLET ORAL
Refills: 0 | Status: DISCONTINUED | COMMUNITY
End: 2021-06-28

## 2021-06-28 RX ORDER — ASPIRIN ENTERIC COATED TABLETS 81 MG 81 MG/1
81 TABLET, DELAYED RELEASE ORAL DAILY
Qty: 30 | Refills: 2 | Status: DISCONTINUED | COMMUNITY
Start: 2019-01-30 | End: 2021-06-28

## 2021-06-28 RX ORDER — PROGESTERONE 200 MG/1
200 CAPSULE ORAL
Qty: 30 | Refills: 1 | Status: DISCONTINUED | COMMUNITY
Start: 2019-04-17 | End: 2021-06-28

## 2021-06-28 RX ORDER — AZITHROMYCIN 500 MG/1
500 TABLET, FILM COATED ORAL DAILY
Qty: 7 | Refills: 0 | Status: DISCONTINUED | COMMUNITY
Start: 2020-01-03 | End: 2021-06-28

## 2021-06-28 RX ORDER — OLMESARTAN MEDOXOMIL AND HYDROCHLOROTHIAZIDE 40; 12.5 MG/1; MG/1
40-12.5 TABLET ORAL
Qty: 90 | Refills: 3 | Status: DISCONTINUED | COMMUNITY
Start: 2017-04-18 | End: 2021-06-28

## 2021-06-28 RX ORDER — PREDNISONE 10 MG/1
10 TABLET ORAL
Qty: 60 | Refills: 0 | Status: DISCONTINUED | COMMUNITY
Start: 2020-01-03 | End: 2021-06-28

## 2021-06-28 RX ORDER — HUMAN INSULIN 100 [IU]/ML
100 INJECTION, SUSPENSION SUBCUTANEOUS
Qty: 1 | Refills: 2 | Status: DISCONTINUED | COMMUNITY
Start: 2019-05-24 | End: 2021-06-28

## 2021-06-28 RX ORDER — TIOTROPIUM BROMIDE INHALATION SPRAY 3.12 UG/1
2.5 SPRAY, METERED RESPIRATORY (INHALATION) DAILY
Qty: 3 | Refills: 3 | Status: DISCONTINUED | COMMUNITY
Start: 2018-09-20 | End: 2021-06-28

## 2021-06-28 RX ORDER — FLUTICASONE PROPIONATE 50 UG/1
50 SPRAY, METERED NASAL
Qty: 48 | Refills: 0 | Status: DISCONTINUED | COMMUNITY
Start: 2017-12-13 | End: 2021-06-28

## 2021-06-28 RX ORDER — MEPOLIZUMAB 100 MG/ML
100 INJECTION, POWDER, FOR SOLUTION SUBCUTANEOUS
Qty: 1 | Refills: 11 | Status: DISCONTINUED | COMMUNITY
Start: 2019-12-27 | End: 2021-06-28

## 2021-06-28 RX ORDER — BUDESONIDE 0.5 MG/2ML
0.5 INHALANT ORAL TWICE DAILY
Qty: 2 | Refills: 2 | Status: DISCONTINUED | COMMUNITY
Start: 2019-05-09 | End: 2021-06-28

## 2021-06-28 RX ORDER — NIFEDIPINE 60 MG/1
60 TABLET, EXTENDED RELEASE ORAL
Qty: 90 | Refills: 3 | Status: DISCONTINUED | COMMUNITY
Start: 2018-12-03 | End: 2021-06-28

## 2021-06-28 RX ORDER — PANTOPRAZOLE 40 MG/1
40 TABLET, DELAYED RELEASE ORAL
Qty: 90 | Refills: 1 | Status: DISCONTINUED | COMMUNITY
Start: 2018-09-20 | End: 2021-06-28

## 2021-07-05 PROBLEM — Z87.42 HISTORY OF VAGINAL DISCHARGE: Status: RESOLVED | Noted: 2018-12-21 | Resolved: 2021-07-05

## 2021-07-05 PROBLEM — Z02.89 ENCOUNTER FOR COMPLETION OF FORM WITH PATIENT: Status: RESOLVED | Noted: 2020-08-28 | Resolved: 2021-07-05

## 2021-07-05 PROBLEM — Z71.89 EDUCATED ABOUT COVID-19 VIRUS INFECTION: Status: RESOLVED | Noted: 2020-06-10 | Resolved: 2021-07-05

## 2021-07-05 PROBLEM — N91.0 DELAYED MENSES: Status: RESOLVED | Noted: 2018-12-21 | Resolved: 2021-07-05

## 2021-07-05 PROBLEM — Z34.90 ENCOUNTER FOR PREGNANCY RELATED EXAMINATION: Status: RESOLVED | Noted: 2019-01-11 | Resolved: 2021-07-05

## 2021-07-05 PROBLEM — Z87.898 HISTORY OF SHORTNESS OF BREATH: Status: RESOLVED | Noted: 2019-04-10 | Resolved: 2021-07-05

## 2021-07-05 NOTE — PHYSICAL EXAM
[No Acute Distress] : no acute distress [Well Nourished] : well nourished [Well Developed] : well developed [Well-Appearing] : well-appearing [PERRL] : pupils equal round and reactive to light [EOMI] : extraocular movements intact [Normal Oropharynx] : the oropharynx was normal [Normal TMs] : both tympanic membranes were normal [No Lymphadenopathy] : no lymphadenopathy [Supple] : supple [Thyroid Normal, No Nodules] : the thyroid was normal and there were no nodules present [No Respiratory Distress] : no respiratory distress  [No Accessory Muscle Use] : no accessory muscle use [Clear to Auscultation] : lungs were clear to auscultation bilaterally [Normal Rate] : normal rate  [Regular Rhythm] : with a regular rhythm [Normal S1, S2] : normal S1 and S2 [No Murmur] : no murmur heard [No Carotid Bruits] : no carotid bruits [Pedal Pulses Present] : the pedal pulses are present [No Edema] : there was no peripheral edema [Normal Appearance] : normal in appearance [No Nipple Discharge] : no nipple discharge [No Axillary Lymphadenopathy] : no axillary lymphadenopathy [Soft] : abdomen soft [Non Tender] : non-tender [Non-distended] : non-distended [No Masses] : no abdominal mass palpated [No HSM] : no HSM [Normal Bowel Sounds] : normal bowel sounds [Normal Supraclavicular Nodes] : no supraclavicular lymphadenopathy [Normal Axillary Nodes] : no axillary lymphadenopathy [Normal Posterior Cervical Nodes] : no posterior cervical lymphadenopathy [Normal Anterior Cervical Nodes] : no anterior cervical lymphadenopathy [Normal Inguinal Nodes] : no inguinal lymphadenopathy [No Joint Swelling] : no joint swelling [No Rash] : no rash [Normal Gait] : normal gait [Normal Affect] : the affect was normal [Right Foot Was Examined] : Right foot ~C was examined [Left Foot Was Examined] : left foot ~C was examined [None] : no ulcers in either foot were found [] : both feet

## 2021-07-08 ENCOUNTER — APPOINTMENT (OUTPATIENT)
Dept: PULMONOLOGY | Facility: CLINIC | Age: 36
End: 2021-07-08
Payer: COMMERCIAL

## 2021-07-08 VITALS
BODY MASS INDEX: 31.72 KG/M2 | RESPIRATION RATE: 16 BRPM | DIASTOLIC BLOOD PRESSURE: 90 MMHG | SYSTOLIC BLOOD PRESSURE: 140 MMHG | WEIGHT: 168 LBS | TEMPERATURE: 97.6 F | HEIGHT: 61 IN | OXYGEN SATURATION: 98 % | HEART RATE: 80 BPM

## 2021-07-08 PROCEDURE — 96372 THER/PROPH/DIAG INJ SC/IM: CPT

## 2021-07-08 PROCEDURE — 99072 ADDL SUPL MATRL&STAF TM PHE: CPT

## 2021-07-08 RX ORDER — MEPOLIZUMAB 100 MG/ML
100 INJECTION, POWDER, FOR SOLUTION SUBCUTANEOUS
Qty: 0 | Refills: 0 | Status: COMPLETED | OUTPATIENT
Start: 2021-07-08

## 2021-07-15 LAB
25(OH)D3 SERPL-MCNC: 31.8 NG/ML
ALBUMIN SERPL ELPH-MCNC: 4.6 G/DL
ALP BLD-CCNC: 85 U/L
ALT SERPL-CCNC: 15 U/L
ANION GAP SERPL CALC-SCNC: 12 MMOL/L
APTT BLD: 30.1 SEC
AST SERPL-CCNC: 16 U/L
BASOPHILS # BLD AUTO: 0.07 K/UL
BASOPHILS NFR BLD AUTO: 0.7 %
BILIRUB SERPL-MCNC: 0.3 MG/DL
BUN SERPL-MCNC: 11 MG/DL
CALCIUM SERPL-MCNC: 10.1 MG/DL
CHLORIDE SERPL-SCNC: 101 MMOL/L
CHOLEST SERPL-MCNC: 233 MG/DL
CO2 SERPL-SCNC: 24 MMOL/L
CREAT SERPL-MCNC: 0.85 MG/DL
CREAT SPEC-SCNC: 80 MG/DL
CREAT SPEC-SCNC: 80 MG/DL
CREAT/PROT UR: 0.1 RATIO
EOSINOPHIL # BLD AUTO: 0.12 K/UL
EOSINOPHIL NFR BLD AUTO: 1.2 %
ESTIMATED AVERAGE GLUCOSE: 131 MG/DL
GLUCOSE SERPL-MCNC: 88 MG/DL
HBA1C MFR BLD HPLC: 6.2 %
HCT VFR BLD CALC: 44.2 %
HDLC SERPL-MCNC: 60 MG/DL
HGB BLD-MCNC: 14.4 G/DL
IMM GRANULOCYTES NFR BLD AUTO: 0.3 %
LDLC SERPL CALC-MCNC: 143 MG/DL
LYMPHOCYTES # BLD AUTO: 1.94 K/UL
LYMPHOCYTES NFR BLD AUTO: 20.2 %
M TB IFN-G BLD-IMP: NEGATIVE
MAN DIFF?: NORMAL
MCHC RBC-ENTMCNC: 29 PG
MCHC RBC-ENTMCNC: 32.6 GM/DL
MCV RBC AUTO: 88.9 FL
MEV IGG FLD QL IA: 181 AU/ML
MEV IGG+IGM SER-IMP: POSITIVE
MICROALBUMIN 24H UR DL<=1MG/L-MCNC: <1.2 MG/DL
MICROALBUMIN/CREAT 24H UR-RTO: NORMAL MG/G
MONOCYTES # BLD AUTO: 0.63 K/UL
MONOCYTES NFR BLD AUTO: 6.5 %
MUV AB SER-ACNC: POSITIVE
MUV IGG SER QL IA: 70.9 AU/ML
NEUTROPHILS # BLD AUTO: 6.83 K/UL
NEUTROPHILS NFR BLD AUTO: 71.1 %
NONHDLC SERPL-MCNC: 173 MG/DL
PLATELET # BLD AUTO: 317 K/UL
POTASSIUM SERPL-SCNC: 4.1 MMOL/L
PROT SERPL-MCNC: 7.6 G/DL
PROT UR-MCNC: 5 MG/DL
QUANTIFERON TB PLUS MITOGEN MINUS NIL: 8.48 IU/ML
QUANTIFERON TB PLUS NIL: 0.1 IU/ML
QUANTIFERON TB PLUS TB1 MINUS NIL: -0.03 IU/ML
QUANTIFERON TB PLUS TB2 MINUS NIL: -0.04 IU/ML
RBC # BLD: 4.97 M/UL
RBC # FLD: 13.5 %
SODIUM SERPL-SCNC: 137 MMOL/L
TRIGL SERPL-MCNC: 150 MG/DL
TSH SERPL-ACNC: 1.72 UIU/ML
VZV AB TITR SER: NEGATIVE
VZV IGG SER IF-ACNC: 91.8 INDEX
WBC # FLD AUTO: 9.62 K/UL

## 2021-07-15 NOTE — ADDENDUM
[FreeTextEntry1] : 7/15/21:  Reviewed labs with pt, nl except A1c 6.2 c/w controlled DM,  elevated (to work on diet/exercise, recheck in 6 months, if still high would consider statin given has DM), varicella nonimmune by blood test but had vaccine x 2 1 month apart so would consider immune (would not boost again), other labs nl.  Form provided to pt.
Rehab evaluation
hematuria, erythema scrotum

## 2021-07-15 NOTE — HISTORY OF PRESENT ILLNESS
[FreeTextEntry1] : physical [de-identified] : 34 yo female with h/o as below here for CPE.\par Needs to start exercising.  Had gained weight with pandemic.  Stress eating.  \par Got COVID in december, daughter tested positive and she tested positive but she didn't really have any symptoms. \par Needs form for work.\par Getting Nucala every 4 weeks, helping her with asthma, still seeing Dr. Kelley.  \par Had anxiety after COVID, still has anxiety some of the time but not as bad.\par Hasn't seen optho but will.\par BP at home 120/85, on occasion diastolic 90 but high number 120.  Doesn't check FSG, fasting this am was 100.  \par Feeling well otherwise.\par

## 2021-07-15 NOTE — ASSESSMENT
[FreeTextEntry1] : 34 yo female with h/o as above including severe persistent allergic asthma, HTN, DM, allergies, anxiety, abnl paps, here for CPE.\par 1.  CV - bp at goal, c/w above regimen for now, check bmp, check lipids\par 2.  Endo - check A1c and microalbumin; will see optho, foot exam nl, pneumovax utd; check TSH for weight gain, vitamin D\par 3.  Gyn - will f/up with gyn for pap, no contraceptive methods used so advised folic acid supplementation and if gets pregnant will need to change antihypertensives like previously done\par 4.  Pulm - asthma under control, followed by pulmonary\par 5.  HCM - check below labs including immunization titers and quant gold for form for work, PTT as abnl previously, urine protein as elevated previously; vaccines utd\par 6.  RTO 3-6 months DM and bp f/up

## 2021-07-15 NOTE — HEALTH RISK ASSESSMENT
[0] : 2) Feeling down, depressed, or hopeless: Not at all (0) [MammogramComments] : hasn't had [PapSmearComments] : few years ago, will f/up  [ColonoscopyComments] : hasn't had

## 2021-07-16 RX ORDER — MEPOLIZUMAB 100 MG/ML
100 INJECTION, POWDER, FOR SOLUTION SUBCUTANEOUS
Qty: 1 | Refills: 11 | Status: ACTIVE | COMMUNITY
Start: 2021-07-16 | End: 1900-01-01

## 2021-08-05 ENCOUNTER — APPOINTMENT (OUTPATIENT)
Dept: PULMONOLOGY | Facility: CLINIC | Age: 36
End: 2021-08-05
Payer: COMMERCIAL

## 2021-08-05 VITALS
DIASTOLIC BLOOD PRESSURE: 88 MMHG | TEMPERATURE: 97.5 F | HEART RATE: 74 BPM | BODY MASS INDEX: 31.72 KG/M2 | OXYGEN SATURATION: 98 % | WEIGHT: 168 LBS | RESPIRATION RATE: 16 BRPM | SYSTOLIC BLOOD PRESSURE: 130 MMHG | HEIGHT: 61 IN

## 2021-08-05 PROCEDURE — 96401 CHEMO ANTI-NEOPL SQ/IM: CPT

## 2021-08-05 RX ORDER — MEPOLIZUMAB 100 MG/ML
100 INJECTION, POWDER, FOR SOLUTION SUBCUTANEOUS
Qty: 0 | Refills: 0 | Status: COMPLETED | OUTPATIENT
Start: 2021-08-05

## 2021-08-30 ENCOUNTER — RX RENEWAL (OUTPATIENT)
Age: 36
End: 2021-08-30

## 2021-09-02 ENCOUNTER — APPOINTMENT (OUTPATIENT)
Dept: PULMONOLOGY | Facility: CLINIC | Age: 36
End: 2021-09-02

## 2021-10-11 ENCOUNTER — APPOINTMENT (OUTPATIENT)
Dept: PULMONOLOGY | Facility: CLINIC | Age: 36
End: 2021-10-11
Payer: COMMERCIAL

## 2021-10-11 VITALS
TEMPERATURE: 97.2 F | WEIGHT: 170 LBS | SYSTOLIC BLOOD PRESSURE: 130 MMHG | DIASTOLIC BLOOD PRESSURE: 65 MMHG | HEIGHT: 61 IN | BODY MASS INDEX: 32.1 KG/M2 | HEART RATE: 93 BPM | RESPIRATION RATE: 16 BRPM | OXYGEN SATURATION: 98 %

## 2021-10-11 PROCEDURE — G0008: CPT

## 2021-10-11 PROCEDURE — 99214 OFFICE O/P EST MOD 30 MIN: CPT | Mod: 25

## 2021-10-11 PROCEDURE — 90682 RIV4 VACC RECOMBINANT DNA IM: CPT

## 2021-10-11 NOTE — ADDENDUM
[FreeTextEntry1] : Documented by Reena Savage acting as a scribe for Dr. Zac Kelley on (10/11/2021).\par \par All medical record entries made by the Scribe were at my, Dr. Zac Kelley's, direction and personally dictated by me on (10/11/2021). I have reviewed the chart and agree that the record accurately reflects my personal performance of the history, physical exam, assessment and plan. I have also personally directed, reviewed, and agree with the discharge instructions.\par

## 2021-10-11 NOTE — ASSESSMENT
[FreeTextEntry1] : Ms. Blue is a 35 year old female with a history of asthma / GERD / Allergy / overweight. Her asthma is active and is both IgE mediated and eosinophil mediated (currently off on Nucala), and s/p delivery (7/12/2019) - now improved on Nucala.- controlled on nucala \par \par problem 1: severe persistent asthma (controlled)\par *climate related - GERD - avoid mold exposure\par -Continue albuterol by the nebulizer QID\par -continue Pulmicort (Budesonide) (0.5) BID by nebulizer, PRN  (gargle and rinse after use)\par -continue to use Ventolin PRN and before exercise\par -continue to use Advair 500 at 1 inhalation BId\par -Continue QVar 80 at 2 inhalations BID\par -Continue Spiriva 2 inhalations QD\par -continue to use Singulair 10 mg before bed\par -Inhaler technique reviewed as well as oral hygiene techniques reviewed with patient. Avoidance of cold air, extremes of temperature, rescue inhaler should be used before exercise. Order of medication reviewed with patient. Recommended use of a cool mist humidifier in the bedroom. \par \par problem 2: Allergic Asthma (Elevated IgE)\par -her elevated IgE makes her a candidate for Xolair\par \par Problem 3; Eosinophilic Asthma\par -on Nucala - hold due to COVID-19- restarted 6/2020 -now- will re-evaluate for Fasenra and Dupixent \par -The safety and efficacy of Nucala was established in three double-blind, randomized, placebo-controlled trials in patients with severe asthma. Compared to a placebo, patients with severe asthma receiving Nucala had fewer exacerbation requiring hospitalization and/or emergency department visits, and a longer time to first exacerbation. In addition, patients with severe asthma receiving Nucala or Fasenra experienced greater reductions in their daily maintenance oral corticosteroid dose, while maintaining asthma control compared with patients receiving placebo. Treatment with Nucala did not result in a significant improvement in lung function, as measured by the volume of air exhaled by patients in one second. The most common side effects include: headache, injection site reactions, back pain, weakness, and fatigue; hypersensitivity reactions can occur within hours or days including swelling of the face, mouth, and tongue, fainting, dizziness, hives, breathing problems, and rash; herpes zoster infections have occurred. The drug is a monoclonal antibody that inhibits interleukin-5 which helps regular eosinophils, a type of white blood cell that contributes to asthma. The over-production of eosinophils can cause inflammation in the lungs, increasing the frequency of asthma attacks. Patients must also take other medications, including high dose inhaled corticosteroids and at least one additional asthma drug\par \par problem 4: allergic rhinitis\par -add Claritin 10 mg QAM \par -continue to use Flonase 1 sniff each nostril BID\par -continue to use Xyzal 5 mg before bed \par -Environmental measures for allergies were encouraged including mattress and pillow cover, air purifier, and environmental controls.\par \par problem 5: low vitamin D\par -continue to use supplemental vitamin D\par -Has been associated with asthma exacerbations and increased allergic symptoms. The goal based on recent information is maintaining levels between 50-70 and low normal is 30. Recommended 50,000 units every two weeks to once a month depending on the level. \par \par problem 6: GERD -(stable/controlled)\par -continue Protonix 40 mg QAM \par -Continue Pepcid 40 mg QHS \par -Rule of 2s: avoid eating too much, eating too late, eating too spicy, eating two hours before bed\par -Things to avoid including overeating, spicy foods, tight clothing, eating within three hours of bed, this list is not all inclusive. \par -For treatment of reflux, possible options discussed including diet control, H2 blockers, PPIs, as well as coating motility agents discussed as treatment options. Timing of meals and proximity of last meal to sleep were discussed. If symptoms persist, a formal gastrointestinal evaluation is needed. \par \par problem 7: overweight - in progress\par -Recommend "Muniq" OTC Supplement \par -Weight loss, exercise, and diet control were discussed and are highly encouraged. Treatment options were given such as, aqua therapy, and contacting a nutritionist. Recommended to use the elliptical, stationary bike, less use of treadmill. \par \par Problem 8: Health Maintenance/COVID19 Precautions \par - S/p Covid 19 vaccine (Pfizer) x2 \par - Clean your hands often. Wash your hands often with soap and water for at least 20 seconds, especially after blowing your nose, coughing, or sneezing, or having been in a public place.\par - If soap and water are not available, use a hand  that contains at least 60% alcohol.\par - To the extent possible, avoid touching high-touch surfaces in public places - elevator buttons, door handles, handrails, handshaking with people, etc. Use a tissue or your sleeve to cover your hand or finger if you must touch something.\par - Wash your hands after touching surfaces in public places.\par - Avoid touching your face, nose, eyes, etc.\par - Clean and disinfect your home to remove germs: practice routine cleaning of frequently touched surfaces (for example: tables, doorknobs, light switches, handles, desks, toilets, faucets, sinks & cell phones)\par - Avoid crowds, especially in poorly ventilated spaces. Your risk of exposure to respiratory viruses like COVID-19 may increase in crowded, closed-in settings with little air circulation if there are people in the crowd who are sick. All patients are recommended to practice social distancing and stay at least 6 feet away from others. \par - Avoid all non-essential travel including plane trips, and especially avoid embarking on cruise ships.\par -If COVID-19 is spreading in your community, take extra measures to put distance between yourself and other people to further reduce your risk of being exposed to this new virus.\par -Stay home as much as possible.\par - Consider ways of getting food brought to your house through family, social, or commercial networks\par -Be aware that the virus has been known to live in the air up to 3 hours post exposure, cardboard up to 24 hours post exposure, copper up to 4 hours post exposure, steel and plastic up to 2-3 days post exposure. Risk of transmission from these surfaces are affected by many variables.\par COVID-19 precautionary Immune Support Recommendations:\par -OTC Vitamin C 500mg BID \par -OTC Quercetin 250-500mg BID \par -OTC Zinc 75-100mg per day \par -OTC Melatonin 1 or 2mg a night \par -OTC Vitamin D 1-4000mg per day \par -OTC Tonic Water 8oz per day\par -Water 0.5-1 gallon per day\par Asthma and COVID19:\par You need to make sure your asthma is under control. This often requires the use of inhaled corticosteroids (and sometimes oral corticosteroids). Inhaled corticosteroids do not likely reduce your immune system’s ability to fight infections, but oral corticosteroids may. It is important to use the steps above to protect yourself to limit your exposure to any respiratory virus. \par \par problem 9: health maintenance \par -recommended yearly flu shot after October 15 (9/15/2021)\par -recommended strep pneumonia vaccines: Prevnar-13 vaccine, followed by Pneumo vaccine 23 one year following\par -recommended early intervention for URIs\par -recommended regular osteoporosis evaluations\par -recommended early dermatological evaluations\par -recommended after the age of 50 to the age of 70, colonoscopy every 5 years \par \par Patient may follow up in 2 months with SPI and NiOx\par She is encouraged to call with any changes, concerns, or questions

## 2021-10-11 NOTE — HISTORY OF PRESENT ILLNESS
[FreeTextEntry1] : Ms. Blue is a 35 year old female with a history of allergic eosinophilia, eosinophilic asthma, allergic rhinitis, elevated IgE, severe persistent asthma and vitamin D deficiency presenting today for a follow up visit. Her chief complaint is weight gain over the pandemic.\par -she notes having issues getting her nucala medication because insurance would not pay for it anymore\par -she notes gaining some weight \par -she notes sleeping better \par -she requests flu shot \par patient denies any headaches, nausea, vomiting, fever, chills, sweats, chest pain, chest pressure, palpitations, coughing, wheezing, fatigue, diarrhea, constipation, dysphagia, myalgias, dizziness, leg swelling, leg pain, itchy eyes, itchy ears, heartburn, reflux or sour taste in the mouth

## 2021-10-11 NOTE — REVIEW OF SYSTEMS
[Recent Wt Gain (___ Lbs)] : ~T recent [unfilled] lb weight gain [Recent Wt Loss (___ Lbs)] : ~T recent [unfilled] lb weight loss [Negative] : Endocrine [Chest Discomfort] : no chest discomfort [GERD] : no gerd [Diarrhea] : no diarrhea [Constipation] : no constipation [Dysphagia] : no dysphagia

## 2021-11-30 ENCOUNTER — RX RENEWAL (OUTPATIENT)
Age: 36
End: 2021-11-30

## 2021-12-04 NOTE — REVIEW OF SYSTEMS
[Recent Change In Weight] : ~T recent weight change [Anxiety] : anxiety [Negative] : Musculoskeletal [Fever] : no fever [Chills] : no chills [Fatigue] : no fatigue [Chest Pain] : no chest pain [Palpitations] : no palpitations [Shortness Of Breath] : no shortness of breath [Cough] : no cough [Abdominal Pain] : no abdominal pain [Nausea] : no nausea [Constipation] : no constipation [Diarrhea] : diarrhea [Vomiting] : no vomiting [Heartburn] : no heartburn [Melena] : no melena [Dysuria] : no dysuria [Vaginal Discharge] : no vaginal discharge [Skin Rash] : no skin rash [Headache] : no headache [Dizziness] : no dizziness [Fainting] : no fainting [Depression] : no depression [Easy Bleeding] : no easy bleeding [Swollen Glands] : no swollen glands Fair

## 2022-02-23 ENCOUNTER — APPOINTMENT (OUTPATIENT)
Dept: PULMONOLOGY | Facility: CLINIC | Age: 37
End: 2022-02-23
Payer: COMMERCIAL

## 2022-02-23 ENCOUNTER — NON-APPOINTMENT (OUTPATIENT)
Age: 37
End: 2022-02-23

## 2022-02-23 VITALS
WEIGHT: 175 LBS | DIASTOLIC BLOOD PRESSURE: 84 MMHG | SYSTOLIC BLOOD PRESSURE: 134 MMHG | TEMPERATURE: 97.2 F | OXYGEN SATURATION: 97 % | BODY MASS INDEX: 32.2 KG/M2 | HEART RATE: 78 BPM | RESPIRATION RATE: 16 BRPM | HEIGHT: 62 IN

## 2022-02-23 PROCEDURE — 95012 NITRIC OXIDE EXP GAS DETER: CPT

## 2022-02-23 PROCEDURE — 99214 OFFICE O/P EST MOD 30 MIN: CPT | Mod: 25

## 2022-02-23 PROCEDURE — 96401 CHEMO ANTI-NEOPL SQ/IM: CPT

## 2022-02-23 PROCEDURE — 94010 BREATHING CAPACITY TEST: CPT

## 2022-02-23 RX ORDER — MEPOLIZUMAB 100 MG/ML
100 INJECTION, POWDER, FOR SOLUTION SUBCUTANEOUS
Qty: 0 | Refills: 0 | Status: COMPLETED | OUTPATIENT
Start: 2022-02-23

## 2022-02-23 RX ADMIN — MEPOLIZUMAB 1 MG: 100 INJECTION, POWDER, FOR SOLUTION SUBCUTANEOUS at 00:00

## 2022-02-23 NOTE — ASSESSMENT
[FreeTextEntry1] : Ms. Blue is a 36 year old female with a history of asthma / GERD / Allergy / overweight. Her asthma is active and is both IgE mediated and eosinophil mediated- she was doing well on Nucala and then had to stop in the fall. She us currently in the midst of an asthma flare. \par \par problem 1: severe persistent asthma- mild flare\par -restart albuterol by the nebulizer BID- new tubing provided\par -continue symbicort 160 but use 2 puffs AM and PM\par -add Spiriva 2 inhalations QD (sample given)\par -add Qvar 2 puffs am and pm (until back on nucala monthly and asthma is under control)\par -continue to use Singulair 10 mg before bed\par -Inhaler technique reviewed as well as oral hygiene techniques reviewed with patient. Avoidance of cold air, extremes of temperature, rescue inhaler should be used before exercise. Order of medication reviewed with patient. Recommended use of a cool mist humidifier in the bedroom. \par \par Problem 2: Eosinophilic Asthma\par -restart nucala injection here today- dose given by RN\par -office looked into coverage and she still has coverage- we will order more nucala for the patient/re-ship here to start 1 monthly injections\par \par problem 3: allergic rhinitis\par -Claritin 10 mg QAM \par -continue to use Flonase 1 sniff each nostril BID\par -continue to use Xyzal 5 mg before bed \par -Environmental measures for allergies were encouraged including mattress and pillow cover, air purifier, and environmental controls.\par \par problem 4: low vitamin D\par -continue to use supplemental vitamin D\par -Has been associated with asthma exacerbations and increased allergic symptoms. The goal based on recent information is maintaining levels between 50-70 and low normal is 30. Recommended 50,000 units every two weeks to once a month depending on the level. \par \par problem 5: GERD -(stable/controlled)\par -continue Protonix 40 mg QAM \par -Continue Pepcid 40 mg QHS \par -Rule of 2s: avoid eating too much, eating too late, eating too spicy, eating two hours before bed\par \par Patient to follow up in 6 months\par 1 month RTO for Nucala\par She is encouraged to call with any changes, concerns, or questions

## 2022-02-23 NOTE — HISTORY OF PRESENT ILLNESS
[FreeTextEntry1] : Ms. Blue is a 36 year old female with a history of allergic eosinophilia, eosinophilic asthma, allergic rhinitis, elevated IgE, severe persistent asthma and vitamin D deficiency presenting today for a follow up visit. \par \par She reports she had been in her normal state of health until last week. \par She developed wheezing and SOB- mainly occuring in am and pm.\par She was on Nucala and doing so well- only needing symbicort 1 puff BID. \par She continues on singulair and all other allergy meds. \par \par She had insurance issues and had to stop nucala. Last injection was in August. \par \par Patient denies any headaches, nausea, vomiting, fever, chills, sweats, chest pain, chest pressure, palpitations, fatigue, diarrhea, constipation, dysphagia, myalgias, dizziness, leg swelling, leg pain, itchy eyes, itchy ears, heartburn, reflux or sour taste in the mouth

## 2022-02-23 NOTE — REVIEW OF SYSTEMS
[Dyspnea] : dyspnea [Wheezing] : wheezing [Negative] : Endocrine [Chest Discomfort] : no chest discomfort [GERD] : no gerd [Diarrhea] : no diarrhea [Dysphagia] : no dysphagia [Constipation] : no constipation

## 2022-02-23 NOTE — PHYSICAL EXAM
[Normal Conjunctiva] : the conjunctiva exhibited no abnormalities [Eyelids - No Xanthelasma] : the eyelids demonstrated no xanthelasmas [Normal Oropharynx] : normal oropharynx [III] : III [Neck Appearance] : the appearance of the neck was normal [Neck Cervical Mass (___cm)] : no neck mass was observed [Jugular Venous Distention Increased] : there was no jugular-venous distention [Heart Rate And Rhythm] : heart rate and rhythm were normal [Heart Sounds] : normal S1 and S2 [Murmurs] : no murmurs present [Respiration, Rhythm And Depth] : normal respiratory rhythm and effort [Exaggerated Use Of Accessory Muscles For Inspiration] : no accessory muscle use [Auscultation Breath Sounds / Voice Sounds] : lungs were clear to auscultation bilaterally [Abdomen Soft] : soft [Abdomen Tenderness] : non-tender [Abdomen Mass (___ Cm)] : no abdominal mass palpated [Abnormal Walk] : normal gait [Gait - Sufficient For Exercise Testing] : the gait was sufficient for exercise testing [Nail Clubbing] : no clubbing of the fingernails [Cyanosis, Localized] : no localized cyanosis [Petechial Hemorrhages (___cm)] : no petechial hemorrhages [Skin Color & Pigmentation] : normal skin color and pigmentation [] : no rash [No Venous Stasis] : no venous stasis [Skin Lesions] : no skin lesions [No Skin Ulcers] : no skin ulcer [No Xanthoma] : no  xanthoma was observed [Deep Tendon Reflexes (DTR)] : deep tendon reflexes were 2+ and symmetric [Sensation] : the sensory exam was normal to light touch and pinprick [No Focal Deficits] : no focal deficits [Oriented To Time, Place, And Person] : oriented to person, place, and time [Impaired Insight] : insight and judgment were intact [Affect] : the affect was normal [General Appearance - Well Developed] : well developed [Normal Appearance] : normal appearance [Well Groomed] : well groomed [General Appearance - Well Nourished] : well nourished [No Deformities] : no deformities [General Appearance - In No Acute Distress] : no acute distress [TextBox_68] : I:E 1:3; Clear  [FreeTextEntry1] : I:E ratio 1:3; clear

## 2022-03-01 NOTE — ED ADULT TRIAGE NOTE - SOURCE OF INFORMATION
Patient 5-Fu Counseling: 5-Fluorouracil Counseling:  I discussed with the patient the risks of 5-fluorouracil including but not limited to erythema, scaling, itching, weeping, crusting, and pain.

## 2022-03-18 ENCOUNTER — TRANSCRIPTION ENCOUNTER (OUTPATIENT)
Age: 37
End: 2022-03-18

## 2022-03-23 ENCOUNTER — APPOINTMENT (OUTPATIENT)
Dept: PULMONOLOGY | Facility: CLINIC | Age: 37
End: 2022-03-23

## 2022-04-06 NOTE — H&P ADULT - HISTORY OF PRESENT ILLNESS
[FreeTextEntry1] : This note was written by Ana Boles on 03/30/2022 acting as scribe for Dr. Jimy Sinha M.D.\par \par I, Dr. Jimy Sinha, have read and attest that all the information, medical decision making and discharge instructions within are true and accurate. JAMAAL SHEPHERD  33y  Female [id number]    HPI:34 yo  at 21w0d presents to the ED due to SOB, wheezing.  Pt has a PMH of severe persistent asthma, GERD, cHTN and DM2. Pt was seen in the ED on 3/19 due to similar complaints.  She was given albuterol/ ipratropium nebulizer treatment and was greatly improved, was d/c home on prednisone 20mg BID. On 3/19 pt was hypertensive but BPs normalized after albuterol treatment and was d/c home pending a BP check with OB and to f/u with pulm. Pt reports she went to 2 pharmacies to  the prednisone but was on backorder.  Pt reports symptoms today were much worse and came to the ED.  Pt has an appt with pulm on 3/22 and an appt with OB on 3/21.     Pt has never been hospitalized for asthma. Reports an attack 2018 due to changing of the seasons and took a short course of prednisone which relieved symptoms.     Name of GYN Physician: Dr Sherin Licea: Dr Kelley  POB:  2014, - IOL due to PEC @ 38w.  Pt reports she was hospitalized around 32weeks due to sPEC.   She was given Mg at that time and was d/c in a few days.  Pgyn: Denies fibroids, cysts, STI's, Abnormal pap smears   PMH:  cHTN, severe persistent asthma- climate related, DM2, GERD   PSH: none    Home meds: procardia 90xl, ASA 81, metformin 500 mg qD, spiriva BID, qvar 80 BID, advair 500 BID, ventolin PRN, singulair 10mg qHS, protonix 40mg, zantac 300mg     Social History:  Denies smoking use, drug use, alcohol use. JAMAAL SHEPHERD  33y  Female [id number]    HPI:34 yo  at 21w0d presents to the ED due to SOB, wheezing.  Pt has a PMH of severe persistent asthma, GERD, cHTN and DM2. Pt was seen in the ED on 3/19 due to similar complaints.  She was given albuterol/ ipratropium nebulizer treatment and was greatly improved, was d/c home on prednisone 20mg BID. On 3/19 pt was hypertensive but BPs normalized after albuterol treatment and was d/c home pending a BP check with OB and to f/u with pulm. Pt reports she went to 2 pharmacies to  the prednisone but was on backorder.  Pt reports symptoms today were much worse and came to the ED.  Pt has an appt with pulm on 3/22 and an appt with OB on 3/21. Pt has never been hospitalized for asthma. Reports an attack 2018 due to changing of the seasons and took a short course of prednisone which relieved symptoms.     In ED today pt seen while receiving nebulizer treatment.  Reports feeling better, O2 sat 95%. BPs in severe range 150-170s/ 90-100s.  Hydralazine 10mg IVP administered. denies headache, chest pain, epigastric and RUQ pain.  +FM reported, denies LOF/VB.     Name of GYN Physician: Dr Sherin Licea: Dr Kelley  POB:  2014, - IOL due to PEC @ 38w.  Pt reports she was hospitalized around 32weeks due to sPEC. Developed siPEC at time of delivery.   She was given Mg at that time and was d/c in a few days.  Pgyn: Denies fibroids, cysts, STI's, Abnormal pap smears   PMH:  cHTN, severe persistent asthma- climate related, DM2, GERD   PSH: none    Home meds: procardia 90xl, ASA 81, metformin 500 mg qD, spiriva BID, qvar 80 BID, advair 500 BID, ventolin PRN, singulair 10mg qHS, protonix 40mg, zantac 300mg     Social History:  Denies smoking use, drug use, alcohol use. JAMAAL SHEPHERD  33y  Female [id number]    HPI:32 yo  at 21w0d presents to the ED due to SOB, wheezing.  Pt has a PMH of severe persistent asthma, GERD, cHTN and DM2. Pt was seen in the ED on 3/19 due to similar complaints.  She was given albuterol/ ipratropium nebulizer treatment and was greatly improved, was d/c home on prednisone 20mg BID. On 3/19 pt was hypertensive but BPs normalized after albuterol treatment and was d/c home pending a BP check with OB and to f/u with pulm. Pt reports she went to 2 pharmacies to  the prednisone but was on backorder.  Pt reports symptoms today were much worse and came to the ED.  Pt has an appt with pulm on 3/22 and an appt with OB on 3/21. Pt has never been hospitalized for asthma. Reports an attack 2018 due to changing of the seasons and took a short course of prednisone which relieved symptoms.     In ED today pt seen while receiving third Duonebs treatment, received 125mg IVP solumedrol.  Reports feeling better, O2 sat 95%. BPs in severe range 150-170s/ 90-100s.  Hydralazine 10mg IVP administered. denies headache, chest pain, epigastric and RUQ pain.  +FM reported, denies LOF/VB.     Name of GYN Physician: Dr Sherin Licea: Dr Kelley  POB:  2014, - IOL due to PEC @ 38w.  Pt reports she was hospitalized around 32weeks due to sPEC. Developed siPEC at time of delivery.   She was given Mg at that time and was d/c in a few days.  Pgyn: Denies fibroids, cysts, STI's, Abnormal pap smears   PMH:  cHTN, severe persistent asthma- climate related, DM2, GERD   PSH: none    Home meds: procardia 90xl, ASA 81, metformin 500 mg qD, spiriva BID, qvar 80 BID, advair 500 BID, ventolin PRN, singulair 10mg qHS, protonix 40mg, zantac 300mg     Social History:  Denies smoking use, drug use, alcohol use.

## 2022-05-27 ENCOUNTER — RX RENEWAL (OUTPATIENT)
Age: 37
End: 2022-05-27

## 2022-06-28 ENCOUNTER — RX RENEWAL (OUTPATIENT)
Age: 37
End: 2022-06-28

## 2022-08-01 ENCOUNTER — APPOINTMENT (OUTPATIENT)
Dept: INTERNAL MEDICINE | Facility: CLINIC | Age: 37
End: 2022-08-01

## 2022-08-01 VITALS
HEART RATE: 92 BPM | DIASTOLIC BLOOD PRESSURE: 74 MMHG | WEIGHT: 174 LBS | OXYGEN SATURATION: 98 % | HEIGHT: 62 IN | SYSTOLIC BLOOD PRESSURE: 124 MMHG | BODY MASS INDEX: 32.02 KG/M2

## 2022-08-01 DIAGNOSIS — Z01.812 ENCOUNTER FOR PREPROCEDURAL LABORATORY EXAMINATION: ICD-10-CM

## 2022-08-01 PROCEDURE — 99395 PREV VISIT EST AGE 18-39: CPT

## 2022-08-01 RX ORDER — BECLOMETHASONE DIPROPIONATE HFA 80 UG/1
80 AEROSOL, METERED RESPIRATORY (INHALATION) TWICE DAILY
Qty: 1 | Refills: 5 | Status: DISCONTINUED | COMMUNITY
Start: 2019-11-05 | End: 2022-08-01

## 2022-08-01 RX ORDER — ALBUTEROL SULFATE 90 UG/1
108 (90 BASE) AEROSOL, METERED RESPIRATORY (INHALATION)
Qty: 1 | Refills: 3 | Status: DISCONTINUED | COMMUNITY
Start: 2018-08-09 | End: 2022-08-01

## 2022-08-01 RX ORDER — TIOTROPIUM BROMIDE INHALATION SPRAY 3.12 UG/1
2.5 SPRAY, METERED RESPIRATORY (INHALATION) DAILY
Qty: 1 | Refills: 5 | Status: DISCONTINUED | COMMUNITY
Start: 2022-02-23 | End: 2022-08-01

## 2022-08-01 RX ORDER — FAMOTIDINE 40 MG/1
40 TABLET, FILM COATED ORAL
Qty: 90 | Refills: 1 | Status: DISCONTINUED | COMMUNITY
Start: 2020-06-10 | End: 2022-08-01

## 2022-08-01 RX ORDER — LEVOCETIRIZINE DIHYDROCHLORIDE 5 MG/1
5 TABLET ORAL
Qty: 1 | Refills: 1 | Status: DISCONTINUED | COMMUNITY
Start: 2017-12-13 | End: 2022-08-01

## 2022-08-01 RX ORDER — ALBUTEROL SULFATE 90 UG/1
108 (90 BASE) AEROSOL, METERED RESPIRATORY (INHALATION)
Qty: 3 | Refills: 1 | Status: DISCONTINUED | COMMUNITY
Start: 2017-12-13 | End: 2022-08-01

## 2022-08-12 LAB
ALBUMIN SERPL ELPH-MCNC: 4.6 G/DL
ALP BLD-CCNC: 111 U/L
ALT SERPL-CCNC: 17 U/L
ANION GAP SERPL CALC-SCNC: 13 MMOL/L
AST SERPL-CCNC: 17 U/L
BASOPHILS # BLD AUTO: 0.09 K/UL
BASOPHILS NFR BLD AUTO: 1 %
BILIRUB SERPL-MCNC: 0.3 MG/DL
BUN SERPL-MCNC: 10 MG/DL
CALCIUM SERPL-MCNC: 10.3 MG/DL
CHLORIDE SERPL-SCNC: 98 MMOL/L
CHOLEST SERPL-MCNC: 246 MG/DL
CO2 SERPL-SCNC: 25 MMOL/L
CREAT SERPL-MCNC: 0.9 MG/DL
CREAT SPEC-SCNC: 151 MG/DL
EGFR: 84 ML/MIN/1.73M2
EOSINOPHIL # BLD AUTO: 0.77 K/UL
EOSINOPHIL NFR BLD AUTO: 8.9 %
ESTIMATED AVERAGE GLUCOSE: 140 MG/DL
GLUCOSE SERPL-MCNC: 88 MG/DL
HBA1C MFR BLD HPLC: 6.5 %
HCT VFR BLD CALC: 44.6 %
HDLC SERPL-MCNC: 54 MG/DL
HGB BLD-MCNC: 14.3 G/DL
IMM GRANULOCYTES NFR BLD AUTO: 0.2 %
LDLC SERPL CALC-MCNC: 154 MG/DL
LYMPHOCYTES # BLD AUTO: 2.16 K/UL
LYMPHOCYTES NFR BLD AUTO: 24.9 %
M TB IFN-G BLD-IMP: NEGATIVE
MAN DIFF?: NORMAL
MCHC RBC-ENTMCNC: 28.8 PG
MCHC RBC-ENTMCNC: 32.1 GM/DL
MCV RBC AUTO: 89.9 FL
MICROALBUMIN 24H UR DL<=1MG/L-MCNC: <1.2 MG/DL
MICROALBUMIN/CREAT 24H UR-RTO: NORMAL MG/G
MONOCYTES # BLD AUTO: 0.59 K/UL
MONOCYTES NFR BLD AUTO: 6.8 %
NEUTROPHILS # BLD AUTO: 5.06 K/UL
NEUTROPHILS NFR BLD AUTO: 58.2 %
NONHDLC SERPL-MCNC: 191 MG/DL
PLATELET # BLD AUTO: 322 K/UL
POTASSIUM SERPL-SCNC: 4.3 MMOL/L
PROT SERPL-MCNC: 8 G/DL
QUANTIFERON TB PLUS MITOGEN MINUS NIL: >10 IU/ML
QUANTIFERON TB PLUS NIL: 0.04 IU/ML
QUANTIFERON TB PLUS TB1 MINUS NIL: 0 IU/ML
QUANTIFERON TB PLUS TB2 MINUS NIL: 0 IU/ML
RBC # BLD: 4.96 M/UL
RBC # FLD: 13.8 %
SODIUM SERPL-SCNC: 136 MMOL/L
TRIGL SERPL-MCNC: 189 MG/DL
TSH SERPL-ACNC: 1.46 UIU/ML
WBC # FLD AUTO: 8.69 K/UL

## 2022-08-12 NOTE — ASSESSMENT
[FreeTextEntry1] : 35 yo female with h/o as above including HTN, DM, asthma, anxiety, abnl pap, here for CPE.\par 1.  CV - bp at goal, check bmp; check lipids\par 2.  Endo - check A1c and microalbumin, should see optho, foot exam nl; consider prevnar 20; check TSH given weight gain\par 3.  Gyn - will see gyn for pap, not yet due for mammo\par 4.  Pulm - asthma under good control and following with pulm\par 5.  HCM - check below labs including quant gold for form, consider prevnar 20, covid booster, other vaccines utd\par 6.  RTO 3-6 months DM/bp f/up visit

## 2022-08-12 NOTE — REVIEW OF SYSTEMS
[Anxiety] : anxiety [Negative] : ENT [Fever] : no fever [Chills] : no chills [Fatigue] : no fatigue [Recent Change In Weight] : ~T no recent weight change [Chest Pain] : no chest pain [Palpitations] : no palpitations [Shortness Of Breath] : no shortness of breath [Wheezing] : no wheezing [Cough] : no cough [Abdominal Pain] : no abdominal pain [Nausea] : no nausea [Constipation] : no constipation [Diarrhea] : diarrhea [Vomiting] : no vomiting [Dysuria] : no dysuria [Vaginal Discharge] : no vaginal discharge [Joint Pain] : no joint pain [Muscle Pain] : no muscle pain [Skin Rash] : no skin rash [Headache] : no headache [Dizziness] : no dizziness [Fainting] : no fainting [Depression] : no depression [Easy Bleeding] : no easy bleeding [Easy Bruising] : no easy bruising [de-identified] : anxiety better from last year

## 2022-08-12 NOTE — HISTORY OF PRESENT ILLNESS
[FreeTextEntry1] : physical [de-identified] : 37 yo female with h/o as below here for CPE.\par Wanted to get labs today for lipids, A1c.\par Had some weight gain, gained 10 lbs last year and 10 lbs the year before, working 2 jobs, very tired, hard to exercise with the weather, sometimes would skip a meal and then would binge other times.  Tried to stop late night snacking, eating in more, getting take out a little bit less.  Will be stopping second job in a few weeks which might help.\par Asthma has been good, no flares.\par Taking bp at home, bp 117-120/80, this am was low but wasn't drinking as much, took it later and it was back to normal.\par For DM, FSG this am before eating 109, taking metformin inconsistently, taking at least once/day, denies changes in vision or changes in sensation in her feet.\par Had covid in June, had body aches, no respiratory symptoms, symptoms very mild.\par LMP 7/10/22, regular menses.  Awaiting gyn next month and will discuss birth control options.  Currently using condoms.\par Will see cardiologist in a few weeks as well.\par No other active issues.

## 2022-08-12 NOTE — ADDENDUM
[FreeTextEntry1] : 8/11/22:  Reviewed labs, nl except  elevated would offer statin, A1c 6.5 at goal, other labs nl - LM to call back re results.\par 8/12/22:  Reviewed labs above, will start crestor 5 mg day (no longer attempting to conceive, advised can't get pregnant on medication and discussed side effects).  RTO 3 months f/up.

## 2022-08-12 NOTE — HEALTH RISK ASSESSMENT
[Patient reported PAP Smear was normal] : Patient reported PAP Smear was normal [0] : 2) Feeling down, depressed, or hopeless: Not at all (0) [MammogramComments] : hasn't had [PapSmearDate] : 12/18 [PapSmearComments] : awaiting in September [ColonoscopyComments] : hasn't had

## 2022-08-12 NOTE — PHYSICAL EXAM
[No Acute Distress] : no acute distress [Well Nourished] : well nourished [Well Developed] : well developed [Well-Appearing] : well-appearing [PERRL] : pupils equal round and reactive to light [EOMI] : extraocular movements intact [Normal Oropharynx] : the oropharynx was normal [Normal TMs] : both tympanic membranes were normal [No Lymphadenopathy] : no lymphadenopathy [Supple] : supple [Thyroid Normal, No Nodules] : the thyroid was normal and there were no nodules present [No Respiratory Distress] : no respiratory distress  [No Accessory Muscle Use] : no accessory muscle use [Clear to Auscultation] : lungs were clear to auscultation bilaterally [Normal Rate] : normal rate  [Regular Rhythm] : with a regular rhythm [Normal S1, S2] : normal S1 and S2 [No Murmur] : no murmur heard [No Carotid Bruits] : no carotid bruits [Pedal Pulses Present] : the pedal pulses are present [No Edema] : there was no peripheral edema [Normal Appearance] : normal in appearance [No Nipple Discharge] : no nipple discharge [No Axillary Lymphadenopathy] : no axillary lymphadenopathy [Soft] : abdomen soft [Non Tender] : non-tender [Non-distended] : non-distended [No Masses] : no abdominal mass palpated [No HSM] : no HSM [Normal Bowel Sounds] : normal bowel sounds [Normal Supraclavicular Nodes] : no supraclavicular lymphadenopathy [Normal Axillary Nodes] : no axillary lymphadenopathy [Normal Posterior Cervical Nodes] : no posterior cervical lymphadenopathy [Normal Anterior Cervical Nodes] : no anterior cervical lymphadenopathy [Normal Inguinal Nodes] : no inguinal lymphadenopathy [No Joint Swelling] : no joint swelling [No Rash] : no rash [Normal Gait] : normal gait [Normal Affect] : the affect was normal [Right Foot Was Examined] : Right foot ~C was examined [Left Foot Was Examined] : left foot ~C was examined [None] : no ulcers in either foot were found [] : both feet [de-identified] : scattered tattoos (old)

## 2022-08-18 ENCOUNTER — NON-APPOINTMENT (OUTPATIENT)
Age: 37
End: 2022-08-18

## 2022-08-18 ENCOUNTER — APPOINTMENT (OUTPATIENT)
Dept: CARDIOLOGY | Facility: CLINIC | Age: 37
End: 2022-08-18

## 2022-08-18 VITALS
DIASTOLIC BLOOD PRESSURE: 87 MMHG | HEART RATE: 115 BPM | WEIGHT: 177 LBS | SYSTOLIC BLOOD PRESSURE: 137 MMHG | HEIGHT: 62 IN | OXYGEN SATURATION: 97 % | BODY MASS INDEX: 32.57 KG/M2

## 2022-08-18 PROCEDURE — 99214 OFFICE O/P EST MOD 30 MIN: CPT

## 2022-08-18 PROCEDURE — 93000 ELECTROCARDIOGRAM COMPLETE: CPT

## 2022-08-18 NOTE — PHYSICAL EXAM
[General Appearance - Well Developed] : well developed [Normal Appearance] : normal appearance [Well Groomed] : well groomed [General Appearance - Well Nourished] : well nourished [No Deformities] : no deformities [General Appearance - In No Acute Distress] : no acute distress [Normal Conjunctiva] : the conjunctiva exhibited no abnormalities [Eyelids - No Xanthelasma] : the eyelids demonstrated no xanthelasmas [Normal Oral Mucosa] : normal oral mucosa [No Oral Pallor] : no oral pallor [No Oral Cyanosis] : no oral cyanosis [Normal Jugular Venous A Waves Present] : normal jugular venous A waves present [Normal Jugular Venous V Waves Present] : normal jugular venous V waves present [No Jugular Venous Roberts A Waves] : no jugular venous robetrs A waves [Respiration, Rhythm And Depth] : normal respiratory rhythm and effort [Exaggerated Use Of Accessory Muscles For Inspiration] : no accessory muscle use [Auscultation Breath Sounds / Voice Sounds] : lungs were clear to auscultation bilaterally [Heart Rate And Rhythm] : heart rate and rhythm were normal [Heart Sounds] : normal S1 and S2 [Murmurs] : no murmurs present [Abdomen Soft] : soft [Abdomen Tenderness] : non-tender [Abdomen Mass (___ Cm)] : no abdominal mass palpated [Abnormal Walk] : normal gait [Gait - Sufficient For Exercise Testing] : the gait was sufficient for exercise testing [Nail Clubbing] : no clubbing of the fingernails [Cyanosis, Localized] : no localized cyanosis [Petechial Hemorrhages (___cm)] : no petechial hemorrhages [Skin Color & Pigmentation] : normal skin color and pigmentation [] : no rash [No Venous Stasis] : no venous stasis [Skin Lesions] : no skin lesions [No Skin Ulcers] : no skin ulcer [No Xanthoma] : no  xanthoma was observed [Oriented To Time, Place, And Person] : oriented to person, place, and time [Affect] : the affect was normal [Mood] : the mood was normal [No Anxiety] : not feeling anxious

## 2022-08-24 ENCOUNTER — APPOINTMENT (OUTPATIENT)
Dept: PULMONOLOGY | Facility: CLINIC | Age: 37
End: 2022-08-24

## 2022-08-24 ENCOUNTER — NON-APPOINTMENT (OUTPATIENT)
Age: 37
End: 2022-08-24

## 2022-08-24 VITALS
HEIGHT: 62 IN | DIASTOLIC BLOOD PRESSURE: 70 MMHG | SYSTOLIC BLOOD PRESSURE: 120 MMHG | WEIGHT: 172 LBS | BODY MASS INDEX: 31.65 KG/M2 | TEMPERATURE: 97.2 F | HEART RATE: 111 BPM | RESPIRATION RATE: 16 BRPM | OXYGEN SATURATION: 98 %

## 2022-08-24 DIAGNOSIS — J45.901 UNSPECIFIED ASTHMA WITH (ACUTE) EXACERBATION: ICD-10-CM

## 2022-08-24 PROCEDURE — 95012 NITRIC OXIDE EXP GAS DETER: CPT

## 2022-08-24 PROCEDURE — 94010 BREATHING CAPACITY TEST: CPT

## 2022-08-24 PROCEDURE — 96401 CHEMO ANTI-NEOPL SQ/IM: CPT

## 2022-08-24 PROCEDURE — 99214 OFFICE O/P EST MOD 30 MIN: CPT | Mod: 25

## 2022-08-24 RX ORDER — MEPOLIZUMAB 100 MG/ML
100 INJECTION, POWDER, FOR SOLUTION SUBCUTANEOUS
Qty: 0 | Refills: 0 | Status: COMPLETED | OUTPATIENT
Start: 2022-08-24

## 2022-08-24 RX ADMIN — MEPOLIZUMAB 0 MG: 100 INJECTION, POWDER, FOR SOLUTION SUBCUTANEOUS at 00:00

## 2022-08-24 NOTE — ASSESSMENT
[FreeTextEntry1] : Ms. Blue is a 36 year old female with a history of asthma / GERD / Allergy / overweight. Her asthma is active and is both IgE mediated and eosinophil mediated (currently off on Nucala), and s/p delivery (7/12/2019) - s/p COVID-19 12/2022 and 6/2022 now improved on Nucala.- (controlled) - now off Nucala  \par \par problem 1: severe persistent asthma (controlled)\par -add Symbicort 160 2 inhalations BID \par *climate related - GERD - avoid mold exposure\par -Continue albuterol by the nebulizer QID\par -continue Pulmicort (Budesonide) (0.5) BID by nebulizer, PRN  (gargle and rinse after use)\par -continue to use Ventolin PRN and before exercise\par -Continue QVar 80 at 2 inhalations BID\par -Continue Spiriva 2 inhalations QD\par -continue to use Singulair 10 mg before bed\par -Inhaler technique reviewed as well as oral hygiene techniques reviewed with patient. Avoidance of cold air, extremes of temperature, rescue inhaler should be used before exercise. Order of medication reviewed with patient. Recommended use of a cool mist humidifier in the bedroom. \par \par problem 2: Allergic Asthma (Elevated IgE)\par -her elevated IgE makes her a candidate for Xolair\par \par Problem 3: Eosinophilic Asthma \par - off all - ?restart nucala \par -The safety and efficacy of Nucala was established in three double-blind, randomized, placebo-controlled trials in patients with severe asthma. Compared to a placebo, patients with severe asthma receiving Nucala had fewer exacerbation requiring hospitalization and/or emergency department visits, and a longer time to first exacerbation. In addition, patients with severe asthma receiving Nucala or Fasenra experienced greater reductions in their daily maintenance oral corticosteroid dose, while maintaining asthma control compared with patients receiving placebo. Treatment with Nucala did not result in a significant improvement in lung function, as measured by the volume of air exhaled by patients in one second. The most common side effects include: headache, injection site reactions, back pain, weakness, and fatigue; hypersensitivity reactions can occur within hours or days including swelling of the face, mouth, and tongue, fainting, dizziness, hives, breathing problems, and rash; herpes zoster infections have occurred. The drug is a monoclonal antibody that inhibits interleukin-5 which helps regular eosinophils, a type of white blood cell that contributes to asthma. The over-production of eosinophils can cause inflammation in the lungs, increasing the frequency of asthma attacks. Patients must also take other medications, including high dose inhaled corticosteroids and at least one additional asthma drug\par \par problem 4: allergic rhinitis\par -add Claritin 10 mg QAM \par -continue to use Flonase 1 sniff each nostril BID\par -continue to use Xyzal 5 mg before bed \par -Environmental measures for allergies were encouraged including mattress and pillow cover, air purifier, and environmental controls.\par \par problem 5: low vitamin D\par -continue to use supplemental vitamin D\par -Has been associated with asthma exacerbations and increased allergic symptoms. The goal based on recent information is maintaining levels between 50-70 and low normal is 30. Recommended 50,000 units every two weeks to once a month depending on the level. \par \par problem 6: GERD -(stable/controlled)\par -Add Pepcid 40 mg QHS \par -Rule of 2s: avoid eating too much, eating too late, eating too spicy, eating two hours before bed\par -Things to avoid including overeating, spicy foods, tight clothing, eating within three hours of bed, this list is not all inclusive. \par -For treatment of reflux, possible options discussed including diet control, H2 blockers, PPIs, as well as coating motility agents discussed as treatment options. Timing of meals and proximity of last meal to sleep were discussed. If symptoms persist, a formal gastrointestinal evaluation is needed. \par \par problem 7: overweight - in progress\par - Recommended "10-Day Detox" by Zeke Chris for 2-3 weeks followed by probiotics \par -Recommend "Muniq" OTC Supplement \par -Weight loss, exercise, and diet control were discussed and are highly encouraged. Treatment options were given such as, aqua therapy, and contacting a nutritionist. Recommended to use the elliptical, stationary bike, less use of treadmill. \par \par Problem 8: Health Maintenance/COVID19 Precautions : s/p COVID-19 infection 12/2020 and 6/2022 \par - S/p Covid 19 vaccine (Pfizer) x3 \par - Clean your hands often. Wash your hands often with soap and water for at least 20 seconds, especially after blowing your nose, coughing, or sneezing, or having been in a public place.\par - If soap and water are not available, use a hand  that contains at least 60% alcohol.\par - To the extent possible, avoid touching high-touch surfaces in public places - elevator buttons, door handles, handrails, handshaking with people, etc. Use a tissue or your sleeve to cover your hand or finger if you must touch something.\par - Wash your hands after touching surfaces in public places.\par - Avoid touching your face, nose, eyes, etc.\par - Clean and disinfect your home to remove germs: practice routine cleaning of frequently touched surfaces (for example: tables, doorknobs, light switches, handles, desks, toilets, faucets, sinks & cell phones)\par - Avoid crowds, especially in poorly ventilated spaces. Your risk of exposure to respiratory viruses like COVID-19 may increase in crowded, closed-in settings with little air circulation if there are people in the crowd who are sick. All patients are recommended to practice social distancing and stay at least 6 feet away from others. \par - Avoid all non-essential travel including plane trips, and especially avoid embarking on cruise ships.\par -If COVID-19 is spreading in your community, take extra measures to put distance between yourself and other people to further reduce your risk of being exposed to this new virus.\par -Stay home as much as possible.\par - Consider ways of getting food brought to your house through family, social, or commercial networks\par -Be aware that the virus has been known to live in the air up to 3 hours post exposure, cardboard up to 24 hours post exposure, copper up to 4 hours post exposure, steel and plastic up to 2-3 days post exposure. Risk of transmission from these surfaces are affected by many variables.\par COVID-19 precautionary Immune Support Recommendations:\par -OTC Vitamin C 500mg BID \par -OTC Quercetin 250-500mg BID \par -OTC Zinc 75-100mg per day \par -OTC Melatonin 1 or 2mg a night \par -OTC Vitamin D 1-4000mg per day \par -OTC Tonic Water 8oz per day\par -Water 0.5-1 gallon per day\par Asthma and COVID19:\par You need to make sure your asthma is under control. This often requires the use of inhaled corticosteroids (and sometimes oral corticosteroids). Inhaled corticosteroids do not likely reduce your immune system’s ability to fight infections, but oral corticosteroids may. It is important to use the steps above to protect yourself to limit your exposure to any respiratory virus. \par \par problem 9: health maintenance \par -recommended yearly flu shot after October 15 (9/15/2021)\par -recommended strep pneumonia vaccines: Prevnar-13 vaccine, followed by Pneumo vaccine 23 one year following\par -recommended early intervention for URIs\par -recommended regular osteoporosis evaluations\par -recommended early dermatological evaluations\par -recommended after the age of 50 to the age of 70, colonoscopy every 5 years \par \par Patient may follow up in 2 months with SPI and NiOx\par She is encouraged to call with any changes, concerns, or questions

## 2022-08-24 NOTE — HISTORY OF PRESENT ILLNESS
[FreeTextEntry1] : Ms. Blue is a 36 year old female with a history of allergic eosinophilia, eosinophilic asthma, allergic rhinitis, elevated IgE, severe persistent asthma and vitamin D deficiency presenting today for a follow up visit. Her chief complaint is \par - she notes this week she has started feeling her acid reflux start up and this has not happened in awhile. She had stopped her acid reflux medication but she thinks she may need to get back on again. \par - she was having itchy ears in June / July but this month she has not \par - she started using her stationary bike at home 30 min a day everyday \par - she notes she got COVID for the second time.She first had it in Dec 2020, and then June 2022. \par - she notes she has been taking Symbicort 2 in AM and 2 in PM \par -She denies any visual issues, headaches, nausea, vomiting, fever, chills, sweats, chest pains, chest pressure, diarrhea, constipation, dysphagia, myalgia, dizziness, leg swelling, leg pain, itchy eyes, itchy ears, heartburn, reflux, or sour taste in the mouth.\par

## 2022-08-24 NOTE — REASON FOR VISIT
[Follow-Up] : a follow-up visit [Active TB] : active TB [FreeTextEntry1] : eosinophilic asthma, allergic eosinophilia, allergic rhinitis, elevated IgE, severe persistent asthma and vitamin D deficiency, s/p COVID-19 12/2020 and 6/2022

## 2022-08-24 NOTE — PHYSICAL EXAM
[No Acute Distress] : no acute distress [Normal Oropharynx] : normal oropharynx [III] : Mallampati Class: III [Normal Appearance] : normal appearance [No Neck Mass] : no neck mass [Normal Rate/Rhythm] : normal rate/rhythm [Normal S1, S2] : normal s1, s2 [No Murmurs] : no murmurs [No Resp Distress] : no resp distress [Clear to Auscultation Bilaterally] : clear to auscultation bilaterally [No Abnormalities] : no abnormalities [Benign] : benign [Normal Gait] : normal gait [No Clubbing] : no clubbing [No Cyanosis] : no cyanosis [No Edema] : no edema [FROM] : FROM [Normal Color/ Pigmentation] : normal color/ pigmentation [No Focal Deficits] : no focal deficits [Oriented x3] : oriented x3 [Normal Affect] : normal affect [TextBox_68] : I:E 1:3; Clear  None

## 2022-08-24 NOTE — ADDENDUM
[FreeTextEntry1] : Documented by Imani Hernandez acting as a scribe for Dr. Zac Kelley on 08/24/2022 \par \par All medical record entries made by the Scribe were at my, Dr. Zac Kelley's, direction and personally dictated by me on 08/24/2022 . I have reviewed the chart and agree that the record accurately reflects my personal performance of the history, physical exam, assessment and plan. I have also personally directed, reviewed, and agree with the discharge instructions.

## 2022-09-08 ENCOUNTER — RX RENEWAL (OUTPATIENT)
Age: 37
End: 2022-09-08

## 2022-09-23 ENCOUNTER — APPOINTMENT (OUTPATIENT)
Dept: OBGYN | Facility: CLINIC | Age: 37
End: 2022-09-23

## 2022-09-23 NOTE — HISTORY OF PRESENT ILLNESS
[FreeTextEntry1] : Patient is a 36 year old female with a history of gestational HTN, preeclampsia,  allergic eosinophilia, allergic rhinitis, elevated IgE, prophylactic measure, severe persistent asthma and vitamin D deficiency presenting today to [facility] . She is currently s/p vaginal  delivery on 7/12/19 at 37 weeks for elevated BPs - discharged on the procardia 90 mg.  She has no complaints at this time. \par \par - 1st pregnancy - gestational HTN - was on procardia with subsequent preeclampsia - at 38 weeks vaginal delivery 2014. Post the delivery stayed on meds - was changed to Benicar and then switched back to procardia when became pregnant\par - She was hospitalized on 3/20/2019 for an asthmatic attack following mold exposure at work. She is feeling much better and more like herself. She has been losing weight overall during her pregnancy. \par As per the patient, her breathing has improved following her hospitalization. SHe was on a prednisone taper post the hospitalization. s/p vaginal delivery on 7/12/19 at 37 weeks  - still breastfeeding\par

## 2022-09-23 NOTE — DISCUSSION/SUMMARY
[FreeTextEntry1] : Patient is a 36 year old female with a history of gestational HTN, preeclampsia,  allergic eosinophilia, allergic rhinitis, elevated IgE, prophylactic measure, severe persistent asthma and vitamin D deficiency presenting today to establish care in setting of recent hospitalization for an asthmatic attack. She is currently s/p vaginal  delivery on 7/12/19 at 37 weeks for elevated BPs - discharged on the procardia 90 mg.  She has no complaints at this time. \par \par - 1st pregnancy - gestational HTN - was on procardia with subsequent preeclampsia - at 38 weeks vaginal delivery 2014. Post the delivery stayed on meds - was changed to Benicar and then switched back to procardia when became pregnant\par - on procardia with BPs well controlled at home; will decrease procardia to 60 mg and closely watch the BP at home\par - ECG with no acute changes\par - echo done 3/2019 - with normal systolic and diastolic heart failure\par  [EKG obtained to assist in diagnosis and management of assessed problem(s)] : EKG obtained to assist in diagnosis and management of assessed problem(s)

## 2022-09-27 ENCOUNTER — APPOINTMENT (OUTPATIENT)
Dept: PULMONOLOGY | Facility: CLINIC | Age: 37
End: 2022-09-27

## 2022-09-27 VITALS
WEIGHT: 171 LBS | HEART RATE: 96 BPM | HEIGHT: 62 IN | TEMPERATURE: 97.6 F | OXYGEN SATURATION: 98 % | RESPIRATION RATE: 16 BRPM | DIASTOLIC BLOOD PRESSURE: 92 MMHG | BODY MASS INDEX: 31.47 KG/M2 | SYSTOLIC BLOOD PRESSURE: 120 MMHG

## 2022-09-27 PROCEDURE — 96401 CHEMO ANTI-NEOPL SQ/IM: CPT

## 2022-09-27 RX ORDER — MEPOLIZUMAB 100 MG/ML
100 INJECTION, POWDER, FOR SOLUTION SUBCUTANEOUS
Qty: 0 | Refills: 0 | Status: COMPLETED | OUTPATIENT
Start: 2022-09-27

## 2022-10-06 ENCOUNTER — APPOINTMENT (OUTPATIENT)
Dept: CV DIAGNOSTICS | Facility: HOSPITAL | Age: 37
End: 2022-10-06

## 2022-10-06 ENCOUNTER — APPOINTMENT (OUTPATIENT)
Dept: CV DIAGNOSITCS | Facility: HOSPITAL | Age: 37
End: 2022-10-06

## 2022-10-14 NOTE — PROCEDURE
"Start protonix 40 mg 1 tablet 30 minutes before any food in the morning - for 30 days, this is a "PPI trial" to see if decreasing your acid cures your symptoms.     Labwork today, urine today.    Schedule gastroenterology appointment.   Schedule urology appointment.    Schedule a follow up with your primary care doctor.   "
[FreeTextEntry1] : FENO was 81 ; normal value being less than 25\par Fractional exhaled nitric oxide (FENO) is regarded as a simple, noninvasive method for assessing eosinophilic airway inflammation. Produced by a variety of cells within the lung, nitric oxide (NO) concentrations are generally low in healthy individuals. However, high concentrations of NO appear to be involved in nonspecific host defense mechanisms and chronic inflammatory diseases such as asthma. The American Thor[employer] Society (ATS) therefore has recommended using FENO to aid in the diagnosis and monitoring of eosinophilic airway inflammation and asthma, and for identifying steroid responsive individuals whose chronic respiratory symptoms may be airway inflammation.

## 2022-10-26 ENCOUNTER — APPOINTMENT (OUTPATIENT)
Dept: PULMONOLOGY | Facility: CLINIC | Age: 37
End: 2022-10-26

## 2022-10-26 VITALS
BODY MASS INDEX: 33.13 KG/M2 | HEIGHT: 62 IN | SYSTOLIC BLOOD PRESSURE: 120 MMHG | OXYGEN SATURATION: 98 % | HEART RATE: 98 BPM | WEIGHT: 180 LBS | DIASTOLIC BLOOD PRESSURE: 84 MMHG | TEMPERATURE: 97.4 F | RESPIRATION RATE: 16 BRPM

## 2022-10-26 PROCEDURE — 90682 RIV4 VACC RECOMBINANT DNA IM: CPT

## 2022-10-26 PROCEDURE — 96401 CHEMO ANTI-NEOPL SQ/IM: CPT

## 2022-10-26 PROCEDURE — G0008: CPT

## 2022-10-26 RX ORDER — MEPOLIZUMAB 100 MG/ML
100 INJECTION, POWDER, FOR SOLUTION SUBCUTANEOUS
Qty: 0 | Refills: 0 | Status: COMPLETED | OUTPATIENT
Start: 2022-10-24

## 2022-11-06 ENCOUNTER — RX RENEWAL (OUTPATIENT)
Age: 37
End: 2022-11-06

## 2022-12-05 ENCOUNTER — RX RENEWAL (OUTPATIENT)
Age: 37
End: 2022-12-05

## 2022-12-07 ENCOUNTER — APPOINTMENT (OUTPATIENT)
Dept: PULMONOLOGY | Facility: CLINIC | Age: 37
End: 2022-12-07

## 2022-12-10 NOTE — REASON FOR VISIT
no [Initial Evaluation] : an initial evaluation of [Dyspnea] : dyspnea 0 = understands/communicates without difficulty

## 2023-01-19 NOTE — DISCHARGE NOTE ANTEPARTUM - NS AS DC PROVIDER CONTACT Y/N MULTI
Pt. Here for OB/FU. Reports Good FM.   Pt. Denies VB and LOF.   Pt states she has been to the hospital with contractions and cramping over the last month. 3rd and 14th of January 2023  Pt wants Tdap.  JUVENAL chart was given and explained.  Rhogam given today.   BTL offered and pt is going to think about it.      Yes

## 2023-01-26 ENCOUNTER — APPOINTMENT (OUTPATIENT)
Dept: PULMONOLOGY | Facility: CLINIC | Age: 38
End: 2023-01-26
Payer: COMMERCIAL

## 2023-01-26 PROCEDURE — 99214 OFFICE O/P EST MOD 30 MIN: CPT | Mod: 95

## 2023-01-26 NOTE — ASSESSMENT
[FreeTextEntry1] : Ms. Blue is a 36 year old female with a history of asthma / GERD / Allergy / overweight. She is being seen via video call.  Her asthma is active and is both IgE mediated and eosinophil mediated (currently off on Nucala), and s/p delivery (7/12/2019) - s/p COVID-19 12/2022 and 6/2022 now improved on Nucala.- (controlled) - now off Nucala x2 months now -  s/p COVID-19, now dealing with an URI - active asthma \par \par problem 1: severe persistent asthma (active)\par -add Symbicort 160 2 inhalations BID \par *climate related - GERD - avoid mold exposure\par -Continue albuterol by the nebulizer QID\par -continue Pulmicort (Budesonide) (0.5) BID by nebulizer, PRN  (gargle and rinse after use)\par -continue to use Ventolin PRN and before exercise\par -Continue QVar 80 at 2 inhalations BID\par -Continue Spiriva 2 inhalations QD\par -continue to use Singulair 10 mg before bed\par -Inhaler technique reviewed as well as oral hygiene techniques reviewed with patient. Avoidance of cold air, extremes of temperature, rescue inhaler should be used before exercise. Order of medication reviewed with patient. Recommended use of a cool mist humidifier in the bedroom. \par \par Problem 1A: URI \par - blood work; RVP / alegies \par -add Prednisone 20 mg x 7 days, 10 mg x 7 days \par Information sheet given to the patient to be reviewed, this medication is never to be used without consulting the prescribing physician. Proper dietary restraint is necessary specifically salt containing foods, if any reaction may occur should be reported. \par \par \par problem 2: Allergic Asthma (Elevated IgE)\par -her elevated IgE makes her a candidate for Xolair\par \par Problem 3: Eosinophilic Asthma \par - off all - Restart nucala - symptomatic \par -The safety and efficacy of Nucala was established in three double-blind, randomized, placebo-controlled trials in patients with severe asthma. Compared to a placebo, patients with severe asthma receiving Nucala had fewer exacerbation requiring hospitalization and/or emergency department visits, and a longer time to first exacerbation. In addition, patients with severe asthma receiving Nucala or Fasenra experienced greater reductions in their daily maintenance oral corticosteroid dose, while maintaining asthma control compared with patients receiving placebo. Treatment with Nucala did not result in a significant improvement in lung function, as measured by the volume of air exhaled by patients in one second. The most common side effects include: headache, injection site reactions, back pain, weakness, and fatigue; hypersensitivity reactions can occur within hours or days including swelling of the face, mouth, and tongue, fainting, dizziness, hives, breathing problems, and rash; herpes zoster infections have occurred. The drug is a monoclonal antibody that inhibits interleukin-5 which helps regular eosinophils, a type of white blood cell that contributes to asthma. The over-production of eosinophils can cause inflammation in the lungs, increasing the frequency of asthma attacks. Patients must also take other medications, including high dose inhaled corticosteroids and at least one additional asthma drug\par \par problem 4: allergic rhinitis\par -add Claritin 10 mg QAM \par -continue to use Flonase 1 sniff each nostril BID\par -continue to use Xyzal 5 mg before bed \par -Environmental measures for allergies were encouraged including mattress and pillow cover, air purifier, and environmental controls.\par \par problem 5: low vitamin D\par -continue to use supplemental vitamin D\par -Has been associated with asthma exacerbations and increased allergic symptoms. The goal based on recent information is maintaining levels between 50-70 and low normal is 30. Recommended 50,000 units every two weeks to once a month depending on the level. \par \par problem 6: GERD -(stable/controlled)\par -Add Pepcid 40 mg QHS \par -Rule of 2s: avoid eating too much, eating too late, eating too spicy, eating two hours before bed\par -Things to avoid including overeating, spicy foods, tight clothing, eating within three hours of bed, this list is not all inclusive. \par -For treatment of reflux, possible options discussed including diet control, H2 blockers, PPIs, as well as coating motility agents discussed as treatment options. Timing of meals and proximity of last meal to sleep were discussed. If symptoms persist, a formal gastrointestinal evaluation is needed. \par \par problem 7: overweight - in progress\par - Recommended "10-Day Detox" by Zeke Chris for 2-3 weeks followed by probiotics \par -Recommend "Muniq" OTC Supplement \par -Weight loss, exercise, and diet control were discussed and are highly encouraged. Treatment options were given such as, aqua therapy, and contacting a nutritionist. Recommended to use the elliptical, stationary bike, less use of treadmill. \par \par Problem 8: Health Maintenance/COVID19 Precautions : s/p COVID-19 infection 12/2020 and 6/2022 \par - S/p Covid 19 vaccine (Pfizer) x3 \par - Clean your hands often. Wash your hands often with soap and water for at least 20 seconds, especially after blowing your nose, coughing, or sneezing, or having been in a public place.\par - If soap and water are not available, use a hand  that contains at least 60% alcohol.\par - To the extent possible, avoid touching high-touch surfaces in public places - elevator buttons, door handles, handrails, handshaking with people, etc. Use a tissue or your sleeve to cover your hand or finger if you must touch something.\par - Wash your hands after touching surfaces in public places.\par - Avoid touching your face, nose, eyes, etc.\par - Clean and disinfect your home to remove germs: practice routine cleaning of frequently touched surfaces (for example: tables, doorknobs, light switches, handles, desks, toilets, faucets, sinks & cell phones)\par - Avoid crowds, especially in poorly ventilated spaces. Your risk of exposure to respiratory viruses like COVID-19 may increase in crowded, closed-in settings with little air circulation if there are people in the crowd who are sick. All patients are recommended to practice social distancing and stay at least 6 feet away from others. \par - Avoid all non-essential travel including plane trips, and especially avoid embarking on cruise ships.\par -If COVID-19 is spreading in your community, take extra measures to put distance between yourself and other people to further reduce your risk of being exposed to this new virus.\par -Stay home as much as possible.\par - Consider ways of getting food brought to your house through family, social, or commercial networks\par -Be aware that the virus has been known to live in the air up to 3 hours post exposure, cardboard up to 24 hours post exposure, copper up to 4 hours post exposure, steel and plastic up to 2-3 days post exposure. Risk of transmission from these surfaces are affected by many variables.\par COVID-19 precautionary Immune Support Recommendations:\par -OTC Vitamin C 500mg BID \par -OTC Quercetin 250-500mg BID \par -OTC Zinc 75-100mg per day \par -OTC Melatonin 1 or 2mg a night \par -OTC Vitamin D 1-4000mg per day \par -OTC Tonic Water 8oz per day\par -Water 0.5-1 gallon per day\par Asthma and COVID19:\par You need to make sure your asthma is under control. This often requires the use of inhaled corticosteroids (and sometimes oral corticosteroids). Inhaled corticosteroids do not likely reduce your immune system’s ability to fight infections, but oral corticosteroids may. It is important to use the steps above to protect yourself to limit your exposure to any respiratory virus. \par \par problem 9: health maintenance \par -recommended yearly flu shot after October 15 (9/15/2021)\par -recommended strep pneumonia vaccines: Prevnar-13 vaccine, followed by Pneumo vaccine 23 one year following\par -recommended early intervention for URIs\par -recommended regular osteoporosis evaluations\par -recommended early dermatological evaluations\par -recommended after the age of 50 to the age of 70, colonoscopy every 5 years \par \par Patient may follow up in 2 months with SPI and NiOx\par She is encouraged to call with any changes, concerns, or questions

## 2023-01-26 NOTE — HISTORY OF PRESENT ILLNESS
[Home] : at home, [unfilled] , at the time of the visit. [Medical Office: ([facility])___] : at the medical office located in  [Verbal consent obtained from patient] : the patient, [unfilled] [FreeTextEntry1] : Ms. Blue is a 36 year old female with a history of allergic eosinophilia, eosinophilic asthma, allergic rhinitis, elevated IgE, severe persistent asthma and vitamin D deficiency presenting today via video call  for a follow up visit. Her chief complaint is \par - she started to have a cold, but yesterday she started wheezing. \par - she notes nobody else is sick at home. \par - bowels are regular\par - no palpitations\par - she has been having some heart burn / reflux \par - no ankle or leg swelling\par - she notes she has been coughing and wheezing. \par - she notes shes not coughing a lot, but when she does she wheezes. \par - her weight has been stable \par - she has been eating well\par - her asthma has been under control\par - she notes she had COVID back in December 30th and even back then she didn’t have these cough / wheeze symptoms. \par - she notes she has been using her famotidine at night. \par - she has also been taking her desloratadine at night for her allergies\par - she notes she needs a nasal spray \par - she notes she has been off nucala for the last 2 months now. One when she had COVID, and the second one her girls had RSV. \par - \par -She denies any visual issues, headaches, nausea, vomiting, fever, chills, sweats, chest pains, chest pressure, diarrhea, constipation, dysphagia, myalgia, dizziness, leg swelling, leg pain, itchy eyes, itchy ears.

## 2023-01-26 NOTE — ADDENDUM
[FreeTextEntry1] : Documented by Imani Hernandez acting as a scribe for Dr. Zac Kelley on 01/26/2023 \par \par All medical record entries made by the Scribe were at my, Dr. Zac Kelley's, direction and personally dictated by me on 01/26/2023 . I have reviewed the chart and agree that the record accurately reflects my personal performance of the history, physical exam, assessment and plan. I have also personally directed, reviewed, and agree with the discharge instructions.

## 2023-01-26 NOTE — REASON FOR VISIT
[Follow-Up] : a follow-up visit [FreeTextEntry1] : via video call - eosinophilic asthma, allergic eosinophilia, allergic rhinitis, elevated IgE, severe persistent asthma and vitamin D deficiency, s/p COVID-19 12/2020 and 6/2022

## 2023-02-02 ENCOUNTER — APPOINTMENT (OUTPATIENT)
Dept: PULMONOLOGY | Facility: CLINIC | Age: 38
End: 2023-02-02

## 2023-02-12 NOTE — CARDIOLOGY SUMMARY
[___] : [unfilled] [de-identified] : Conclusions: \par 1. Normal mitral valve. Minimal mitral regurgitation.\par 2. Normal trileaflet aortic valve. No aortic valve\par regurgitation seen.\par 3. Normal left ventricular systolic function. No segmental\par wall motion abnormalities.\par 4. Normal right ventricular size and function.\par *** No previous Echo exam.\par ------------------------------------------------------------------------\par Confirmed on  3/8/2019 - 09:52:14 by Kiran Zarate M.D.\par

## 2023-02-12 NOTE — DISCUSSION/SUMMARY
[FreeTextEntry1] : Patient is a 36 year old female with a history of gestational HTN, preeclampsia,  allergic eosinophilia, allergic rhinitis, elevated IgE, prophylactic measure, severe persistent asthma and vitamin D deficiency presenting today to establish care in setting of recent hospitalization for an asthmatic attack. She is currently s/p vaginal  delivery on 7/12/19 at 37 weeks for elevated BPs - discharged on the procardia 90 mg.  She has no complaints at this time. \par \par

## 2023-02-12 NOTE — HISTORY OF PRESENT ILLNESS
[FreeTextEntry1] : Patient presents in for follow up. \par Patient is a 36 year old female with a history of gestational HTN, preeclampsia,  allergic eosinophilia, allergic rhinitis, elevated IgE, prophylactic measure, severe persistent asthma and vitamin D deficiency presenting today to [facility] up . She is currently s/p vaginal  delivery on 7/12/19 at 37 weeks for elevated BPs - discharged on the procardia 90 mg.  She has no complaints at this time. \par \par - 1st pregnancy - gestational HTN - was on procardia with subsequent preeclampsia - at 38 weeks vaginal delivery 2014. Post the delivery stayed on meds - was changed to Benicar and then switched back to procardia when became pregnant\par - She was hospitalized on 3/20/2019 for an asthmatic attack following mold exposure at work. She is feeling much better and more like herself. She has been losing weight overall during her pregnancy. \par As per the patient, her breathing has improved following her hospitalization. SHe was on a prednisone taper post the hospitalization. s/p vaginal delivery on 7/12/19 at 37 weeks  - still breastfeeding\par

## 2023-02-15 ENCOUNTER — APPOINTMENT (OUTPATIENT)
Dept: CARDIOLOGY | Facility: CLINIC | Age: 38
End: 2023-02-15

## 2023-02-24 ENCOUNTER — APPOINTMENT (OUTPATIENT)
Dept: CARDIOLOGY | Facility: CLINIC | Age: 38
End: 2023-02-24

## 2023-02-24 ENCOUNTER — NON-APPOINTMENT (OUTPATIENT)
Age: 38
End: 2023-02-24

## 2023-02-24 ENCOUNTER — APPOINTMENT (OUTPATIENT)
Dept: PULMONOLOGY | Facility: CLINIC | Age: 38
End: 2023-02-24
Payer: COMMERCIAL

## 2023-02-24 VITALS
TEMPERATURE: 97.6 F | OXYGEN SATURATION: 97 % | RESPIRATION RATE: 16 BRPM | WEIGHT: 179 LBS | BODY MASS INDEX: 32.94 KG/M2 | HEART RATE: 97 BPM | HEIGHT: 62 IN | SYSTOLIC BLOOD PRESSURE: 120 MMHG | DIASTOLIC BLOOD PRESSURE: 80 MMHG

## 2023-02-24 DIAGNOSIS — J45.909 UNSPECIFIED ASTHMA, UNCOMPLICATED: ICD-10-CM

## 2023-02-24 DIAGNOSIS — J06.9 ACUTE UPPER RESPIRATORY INFECTION, UNSPECIFIED: ICD-10-CM

## 2023-02-24 PROCEDURE — 95012 NITRIC OXIDE EXP GAS DETER: CPT

## 2023-02-24 PROCEDURE — 99214 OFFICE O/P EST MOD 30 MIN: CPT | Mod: 25

## 2023-02-24 PROCEDURE — 94010 BREATHING CAPACITY TEST: CPT

## 2023-02-24 NOTE — PROCEDURE
[FreeTextEntry1] : PFT reveals mild obstructive dysfunction , with an FEV1 of  2.06L, which is 72% of predicted, with a normal flow volume loop. \par \par FENO was 69; a normal value being less than 25\par Fractional exhaled nitric oxide (FENO) is regarded as a simple, noninvasive method for assessing eosinophilic airway inflammation. Produced by a variety of cells within the lung, nitric oxide (NO) concentrations are generally low in healthy individuals. However, high concentrations of NO appear to be involved in nonspecific host defense mechanisms and chronic inflammatory diseases such as asthma. The American Thoracic Society (ATS) therefore has recommended using FENO to aid in the diagnosis and monitoring of eosinophilic airway inflammation and asthma, and for identifying steroid responsive individuals whose chronic respiratory symptoms may be caused by airway inflammation.

## 2023-02-24 NOTE — ADDENDUM
[FreeTextEntry1] : Documented by AIDE Bobby acting as a scribe for Dr. Zac Kelley on 02/24/2023 .\par \par All medical record entries made by the Scribe were at my, Dr. Zac Kelley's, direction and personally dictated by me on 02/24/2023. I have reviewed the chart and agree that the record accurately reflects my personal performance of the history, physical exam, assessment and plan. I have also personally directed, reviewed, and agree with the discharge instructions.

## 2023-02-24 NOTE — ASSESSMENT
[FreeTextEntry1] : Ms. Blue is a 37 year old female with a history of asthma / GERD / Allergy / overweight.  Her asthma is active and is both IgE mediated and eosinophil mediated (currently off on Nucala), and s/p delivery (7/12/2019) - s/p COVID-19 12/2021, 12/2022 and 6/2022 now improved on Nucala.- (controlled) -  active asthma 1/2022- @baseline\par \par problem 1: severe persistent asthma (controlled)\par -add Symbicort 160 2 inhalations BID \par *climate related - GERD - avoid mold exposure\par -Continue albuterol by the nebulizer QID\par -continue Pulmicort (Budesonide) (0.5) BID by nebulizer, PRN  (gargle and rinse after use)\par -continue to use Ventolin PRN and before exercise\par -Continue QVar 80 at 2 inhalations BID\par -Continue Spiriva 2 inhalations QD\par -continue to use Singulair 10 mg before bed\par -Inhaler technique reviewed as well as oral hygiene techniques reviewed with patient. Avoidance of cold air, extremes of temperature, rescue inhaler should be used before exercise. Order of medication reviewed with patient. Recommended use of a cool mist humidifier in the bedroom. \par \par problem 2: Allergic Asthma (Elevated IgE)\par -her elevated IgE makes her a candidate for Xolair\par \par Problem 3: Eosinophilic Asthma \par - off all - Restart nucala - symptomatic \par -The safety and efficacy of Nucala was established in three double-blind, randomized, placebo-controlled trials in patients with severe asthma. Compared to a placebo, patients with severe asthma receiving Nucala had fewer exacerbation requiring hospitalization and/or emergency department visits, and a longer time to first exacerbation. In addition, patients with severe asthma receiving Nucala or Fasenra experienced greater reductions in their daily maintenance oral corticosteroid dose, while maintaining asthma control compared with patients receiving placebo. Treatment with Nucala did not result in a significant improvement in lung function, as measured by the volume of air exhaled by patients in one second. The most common side effects include: headache, injection site reactions, back pain, weakness, and fatigue; hypersensitivity reactions can occur within hours or days including swelling of the face, mouth, and tongue, fainting, dizziness, hives, breathing problems, and rash; herpes zoster infections have occurred. The drug is a monoclonal antibody that inhibits interleukin-5 which helps regular eosinophils, a type of white blood cell that contributes to asthma. The over-production of eosinophils can cause inflammation in the lungs, increasing the frequency of asthma attacks. Patients must also take other medications, including high dose inhaled corticosteroids and at least one additional asthma drug\par \par problem 4: allergic rhinitis\par -add Claritin 10 mg QAM \par -continue to use Flonase 1 sniff each nostril BID\par -continue to use Xyzal 5 mg before bed \par -Environmental measures for allergies were encouraged including mattress and pillow cover, air purifier, and environmental controls.\par \par problem 5: low vitamin D\par -continue to use supplemental vitamin D\par -Has been associated with asthma exacerbations and increased allergic symptoms. The goal based on recent information is maintaining levels between 50-70 and low normal is 30. Recommended 50,000 units every two weeks to once a month depending on the level. \par \par problem 6: GERD -(stable/controlled)\par -Add Pepcid 40 mg QHS \par -Rule of 2s: avoid eating too much, eating too late, eating too spicy, eating two hours before bed\par -Things to avoid including overeating, spicy foods, tight clothing, eating within three hours of bed, this list is not all inclusive. \par -For treatment of reflux, possible options discussed including diet control, H2 blockers, PPIs, as well as coating motility agents discussed as treatment options. Timing of meals and proximity of last meal to sleep were discussed. If symptoms persist, a formal gastrointestinal evaluation is needed. \par \par problem 7: overweight - in progress\par - Recommended "10-Day Detox" by Zeke Chris for 2-3 weeks followed by probiotics \par -Recommend "Muniq" OTC Supplement \par -Weight loss, exercise, and diet control were discussed and are highly encouraged. Treatment options were given such as, aqua therapy, and contacting a nutritionist. Recommended to use the elliptical, stationary bike, less use of treadmill. \par \par Problem 8: Health Maintenance/COVID19 Precautions : s/p COVID-19 infection 12/2020 and 6/2022, 12/2022\par -recommended SaNOtize nasal spray \par - S/p Covid 19 vaccine (Pfizer) x3 \par - Clean your hands often. Wash your hands often with soap and water for at least 20 seconds, especially after blowing your nose, coughing, or sneezing, or having been in a public place.\par - If soap and water are not available, use a hand  that contains at least 60% alcohol.\par - To the extent possible, avoid touching high-touch surfaces in public places - elevator buttons, door handles, handrails, handshaking with people, etc. Use a tissue or your sleeve to cover your hand or finger if you must touch something.\par - Wash your hands after touching surfaces in public places.\par - Avoid touching your face, nose, eyes, etc.\par - Clean and disinfect your home to remove germs: practice routine cleaning of frequently touched surfaces (for example: tables, doorknobs, light switches, handles, desks, toilets, faucets, sinks & cell phones)\par - Avoid crowds, especially in poorly ventilated spaces. Your risk of exposure to respiratory viruses like COVID-19 may increase in crowded, closed-in settings with little air circulation if there are people in the crowd who are sick. All patients are recommended to practice social distancing and stay at least 6 feet away from others. \par - Avoid all non-essential travel including plane trips, and especially avoid embarking on cruise ships.\par -If COVID-19 is spreading in your community, take extra measures to put distance between yourself and other people to further reduce your risk of being exposed to this new virus.\par -Stay home as much as possible.\par - Consider ways of getting food brought to your house through family, social, or commercial networks\par -Be aware that the virus has been known to live in the air up to 3 hours post exposure, cardboard up to 24 hours post exposure, copper up to 4 hours post exposure, steel and plastic up to 2-3 days post exposure. Risk of transmission from these surfaces are affected by many variables.\par COVID-19 precautionary Immune Support Recommendations:\par -OTC Vitamin C 500mg BID \par -OTC Quercetin 250-500mg BID \par -OTC Zinc 75-100mg per day \par -OTC Melatonin 1 or 2mg a night \par -OTC Vitamin D 1-4000mg per day \par -OTC Tonic Water 8oz per day\par -Water 0.5-1 gallon per day\par Asthma and COVID19:\par You need to make sure your asthma is under control. This often requires the use of inhaled corticosteroids (and sometimes oral corticosteroids). Inhaled corticosteroids do not likely reduce your immune system’s ability to fight infections, but oral corticosteroids may. It is important to use the steps above to protect yourself to limit your exposure to any respiratory virus. \par \par problem 9: health maintenance \par -recommended yearly flu shot after October 15 (9/15/2021)\par -recommended strep pneumonia vaccines: Prevnar-13 vaccine, followed by Pneumo vaccine 23 one year following\par -recommended early intervention for URIs\par -recommended regular osteoporosis evaluations\par -recommended early dermatological evaluations\par -recommended after the age of 50 to the age of 70, colonoscopy every 5 years \par \par Patient may follow up in 2 months with SPI and NiOx\par She is encouraged to call with any changes, concerns, or questions

## 2023-02-24 NOTE — REASON FOR VISIT
[Follow-Up] : a follow-up visit [FreeTextEntry1] : eosinophilic asthma, allergic eosinophilia, allergic rhinitis, elevated IgE, severe persistent asthma and vitamin D deficiency, s/p COVID-19 12/2020 and 6/2022

## 2023-02-27 ENCOUNTER — APPOINTMENT (OUTPATIENT)
Dept: PULMONOLOGY | Facility: CLINIC | Age: 38
End: 2023-02-27
Payer: COMMERCIAL

## 2023-02-27 VITALS
HEIGHT: 62 IN | RESPIRATION RATE: 16 BRPM | OXYGEN SATURATION: 98 % | HEART RATE: 96 BPM | BODY MASS INDEX: 32.76 KG/M2 | DIASTOLIC BLOOD PRESSURE: 80 MMHG | SYSTOLIC BLOOD PRESSURE: 122 MMHG | WEIGHT: 178 LBS | TEMPERATURE: 97.3 F

## 2023-02-27 PROCEDURE — 96401 CHEMO ANTI-NEOPL SQ/IM: CPT

## 2023-02-27 RX ORDER — MEPOLIZUMAB 100 MG/ML
100 INJECTION, POWDER, FOR SOLUTION SUBCUTANEOUS
Qty: 0 | Refills: 0 | Status: COMPLETED | OUTPATIENT
Start: 2023-02-27

## 2023-03-02 ENCOUNTER — RX RENEWAL (OUTPATIENT)
Age: 38
End: 2023-03-02

## 2023-03-02 RX ORDER — METFORMIN HYDROCHLORIDE 500 MG/1
500 TABLET, COATED ORAL
Qty: 180 | Refills: 3 | Status: ACTIVE | COMMUNITY
Start: 2018-04-23 | End: 1900-01-01

## 2023-03-23 ENCOUNTER — TRANSCRIPTION ENCOUNTER (OUTPATIENT)
Age: 38
End: 2023-03-23

## 2023-03-24 ENCOUNTER — TRANSCRIPTION ENCOUNTER (OUTPATIENT)
Age: 38
End: 2023-03-24

## 2023-03-27 ENCOUNTER — TRANSCRIPTION ENCOUNTER (OUTPATIENT)
Age: 38
End: 2023-03-27

## 2023-03-27 ENCOUNTER — APPOINTMENT (OUTPATIENT)
Dept: PULMONOLOGY | Facility: CLINIC | Age: 38
End: 2023-03-27

## 2023-05-03 ENCOUNTER — RX RENEWAL (OUTPATIENT)
Age: 38
End: 2023-05-03

## 2023-05-08 ENCOUNTER — EMERGENCY (EMERGENCY)
Facility: HOSPITAL | Age: 38
LOS: 1 days | Discharge: HOME | End: 2023-05-08
Attending: EMERGENCY MEDICINE
Payer: COMMERCIAL

## 2023-05-08 VITALS
HEART RATE: 104 BPM | SYSTOLIC BLOOD PRESSURE: 167 MMHG | TEMPERATURE: 99 F | OXYGEN SATURATION: 99 % | DIASTOLIC BLOOD PRESSURE: 116 MMHG | WEIGHT: 182.1 LBS | RESPIRATION RATE: 20 BRPM | HEIGHT: 62 IN

## 2023-05-08 LAB
ALBUMIN SERPL ELPH-MCNC: 3.3 G/DL — LOW (ref 3.5–5)
ALP SERPL-CCNC: 90 U/L — SIGNIFICANT CHANGE UP (ref 40–120)
ALT FLD-CCNC: 21 U/L DA — SIGNIFICANT CHANGE UP (ref 10–60)
ANION GAP SERPL CALC-SCNC: 7 MMOL/L — SIGNIFICANT CHANGE UP (ref 5–17)
APTT BLD: 32.9 SEC — SIGNIFICANT CHANGE UP (ref 27.5–35.5)
AST SERPL-CCNC: 13 U/L — SIGNIFICANT CHANGE UP (ref 10–40)
BASOPHILS # BLD AUTO: 0.1 K/UL — SIGNIFICANT CHANGE UP (ref 0–0.2)
BASOPHILS NFR BLD AUTO: 1 % — SIGNIFICANT CHANGE UP (ref 0–2)
BILIRUB SERPL-MCNC: 0.3 MG/DL — SIGNIFICANT CHANGE UP (ref 0.2–1.2)
BUN SERPL-MCNC: 14 MG/DL — SIGNIFICANT CHANGE UP (ref 7–18)
CALCIUM SERPL-MCNC: 9.2 MG/DL — SIGNIFICANT CHANGE UP (ref 8.4–10.5)
CHLORIDE SERPL-SCNC: 103 MMOL/L — SIGNIFICANT CHANGE UP (ref 96–108)
CO2 SERPL-SCNC: 29 MMOL/L — SIGNIFICANT CHANGE UP (ref 22–31)
CREAT SERPL-MCNC: 0.94 MG/DL — SIGNIFICANT CHANGE UP (ref 0.5–1.3)
EGFR: 80 ML/MIN/1.73M2 — SIGNIFICANT CHANGE UP
EOSINOPHIL # BLD AUTO: 0.45 K/UL — SIGNIFICANT CHANGE UP (ref 0–0.5)
EOSINOPHIL NFR BLD AUTO: 4.5 % — SIGNIFICANT CHANGE UP (ref 0–6)
GLUCOSE SERPL-MCNC: 113 MG/DL — HIGH (ref 70–99)
HCG SERPL-ACNC: <1 MIU/ML — SIGNIFICANT CHANGE UP
HCT VFR BLD CALC: 40.2 % — SIGNIFICANT CHANGE UP (ref 34.5–45)
HGB BLD-MCNC: 12.9 G/DL — SIGNIFICANT CHANGE UP (ref 11.5–15.5)
IMM GRANULOCYTES NFR BLD AUTO: 0.3 % — SIGNIFICANT CHANGE UP (ref 0–0.9)
INR BLD: 0.99 RATIO — SIGNIFICANT CHANGE UP (ref 0.88–1.16)
LYMPHOCYTES # BLD AUTO: 2.21 K/UL — SIGNIFICANT CHANGE UP (ref 1–3.3)
LYMPHOCYTES # BLD AUTO: 22.1 % — SIGNIFICANT CHANGE UP (ref 13–44)
MCHC RBC-ENTMCNC: 27.6 PG — SIGNIFICANT CHANGE UP (ref 27–34)
MCHC RBC-ENTMCNC: 32.1 GM/DL — SIGNIFICANT CHANGE UP (ref 32–36)
MCV RBC AUTO: 85.9 FL — SIGNIFICANT CHANGE UP (ref 80–100)
MONOCYTES # BLD AUTO: 0.65 K/UL — SIGNIFICANT CHANGE UP (ref 0–0.9)
MONOCYTES NFR BLD AUTO: 6.5 % — SIGNIFICANT CHANGE UP (ref 2–14)
NEUTROPHILS # BLD AUTO: 6.58 K/UL — SIGNIFICANT CHANGE UP (ref 1.8–7.4)
NEUTROPHILS NFR BLD AUTO: 65.6 % — SIGNIFICANT CHANGE UP (ref 43–77)
NRBC # BLD: 0 /100 WBCS — SIGNIFICANT CHANGE UP (ref 0–0)
NT-PROBNP SERPL-SCNC: 44 PG/ML — SIGNIFICANT CHANGE UP (ref 0–125)
PLATELET # BLD AUTO: 304 K/UL — SIGNIFICANT CHANGE UP (ref 150–400)
POTASSIUM SERPL-MCNC: 3.9 MMOL/L — SIGNIFICANT CHANGE UP (ref 3.5–5.3)
POTASSIUM SERPL-SCNC: 3.9 MMOL/L — SIGNIFICANT CHANGE UP (ref 3.5–5.3)
PROT SERPL-MCNC: 7.7 G/DL — SIGNIFICANT CHANGE UP (ref 6–8.3)
PROTHROM AB SERPL-ACNC: 11.8 SEC — SIGNIFICANT CHANGE UP (ref 10.5–13.4)
RBC # BLD: 4.68 M/UL — SIGNIFICANT CHANGE UP (ref 3.8–5.2)
RBC # FLD: 13.8 % — SIGNIFICANT CHANGE UP (ref 10.3–14.5)
SODIUM SERPL-SCNC: 139 MMOL/L — SIGNIFICANT CHANGE UP (ref 135–145)
TROPONIN I, HIGH SENSITIVITY RESULT: 3 NG/L — SIGNIFICANT CHANGE UP
WBC # BLD: 10.02 K/UL — SIGNIFICANT CHANGE UP (ref 3.8–10.5)
WBC # FLD AUTO: 10.02 K/UL — SIGNIFICANT CHANGE UP (ref 3.8–10.5)

## 2023-05-08 PROCEDURE — 85379 FIBRIN DEGRADATION QUANT: CPT

## 2023-05-08 PROCEDURE — 71045 X-RAY EXAM CHEST 1 VIEW: CPT | Mod: 26

## 2023-05-08 PROCEDURE — 84484 ASSAY OF TROPONIN QUANT: CPT

## 2023-05-08 PROCEDURE — 85025 COMPLETE CBC W/AUTO DIFF WBC: CPT

## 2023-05-08 PROCEDURE — 80053 COMPREHEN METABOLIC PANEL: CPT

## 2023-05-08 PROCEDURE — 85610 PROTHROMBIN TIME: CPT

## 2023-05-08 PROCEDURE — 99285 EMERGENCY DEPT VISIT HI MDM: CPT

## 2023-05-08 PROCEDURE — 71045 X-RAY EXAM CHEST 1 VIEW: CPT

## 2023-05-08 PROCEDURE — 99285 EMERGENCY DEPT VISIT HI MDM: CPT | Mod: 25

## 2023-05-08 PROCEDURE — 84702 CHORIONIC GONADOTROPIN TEST: CPT

## 2023-05-08 PROCEDURE — 85730 THROMBOPLASTIN TIME PARTIAL: CPT

## 2023-05-08 PROCEDURE — 83880 ASSAY OF NATRIURETIC PEPTIDE: CPT

## 2023-05-08 PROCEDURE — 93010 ELECTROCARDIOGRAM REPORT: CPT

## 2023-05-08 PROCEDURE — 93005 ELECTROCARDIOGRAM TRACING: CPT

## 2023-05-08 PROCEDURE — 36415 COLL VENOUS BLD VENIPUNCTURE: CPT

## 2023-05-08 NOTE — ED PROVIDER NOTE - PHYSICAL EXAMINATION
Heart regular lungs clear abdomen soft nontender awake alert not in any distress no calf swelling no edema.  Well-appearing no chest wall tenderness to palpation.

## 2023-05-08 NOTE — ED PROVIDER NOTE - CLINICAL SUMMARY MEDICAL DECISION MAKING FREE TEXT BOX
Patient with chest pain lasting a few seconds chest pain has migrated from the substernal region to the right chest region.  Patient has no PE risk factors we will do a EKG and labs are unremarkable will add on D dimer

## 2023-05-08 NOTE — ED PROVIDER NOTE - NSFOLLOWUPINSTRUCTIONS_ED_ALL_ED_FT
Nonspecific Chest Pain, Adult  Chest pain is an uncomfortable, tight, or painful feeling in the chest. The pain can feel like a crushing, aching, or squeezing pressure. A person can feel a burning or tingling sensation. Chest pain can also be felt in your back, neck, jaw, shoulder, or arm. This pain can be worse when you move, sneeze, or take a deep breath.    Chest pain can be caused by a condition that is life-threatening. This must be treated right away. It can also be caused by something that is not life-threatening. If you have chest pain, it can be hard to know the difference, so it is important to get help right away to make sure that you do not have a serious condition.    Some life-threatening causes of chest pain include:  Heart attack.  A tear in the body's main blood vessel (aortic dissection).  Inflammation around your heart (pericarditis).  A problem in the lungs, such as a blood clot (pulmonary embolism) or a collapsed lung (pneumothorax).  Some non life-threatening causes of chest pain include:  Heartburn.  Anxiety or stress.  Damage to the bones, muscles, and cartilage that make up your chest wall.  Pneumonia or bronchitis.  Shingles infection (varicella-zoster virus).  Your chest pain may come and go. It may also be constant. Your health care provider will do tests and other studies to find the cause of your pain. Treatment will depend on the cause of your chest pain.    Follow these instructions at home:  Medicines    Take over-the-counter and prescription medicines only as told by your health care provider.  If you were prescribed an antibiotic medicine, take it as told by your health care provider. Do not stop taking the antibiotic even if you start to feel better.  Activity    Avoid any activities that cause chest pain.  Do not lift anything that is heavier than 10 lb (4.5 kg), or the limit that you are told, until your health care provider says that it is safe.  Rest as directed by your health care provider.  Return to your normal activities only as told by your health care provider. Ask your health care provider what activities are safe for you.  Lifestyle    A plate along with examples of foods in a healthy diet.  Silhouette of a person sitting on the floor doing yoga.  Do not use any products that contain nicotine or tobacco, such as cigarettes, e-cigarettes, and chewing tobacco. If you need help quitting, ask your health care provider.  Do not drink alcohol.  Make healthy lifestyle changes as recommended. These may include:  Getting regular exercise. Ask your health care provider to suggest some exercises that are safe for you.  Eating a heart-healthy diet. This includes plenty of fresh fruits and vegetables, whole grains, low-fat (lean) protein, and low-fat dairy products. A dietitian can help you find healthy eating options.  Maintaining a healthy weight.  Managing any other health conditions you may have, such as high blood pressure (hypertension) or diabetes.  Reducing stress, such as with yoga or relaxation techniques.  General instructions    Pay attention to any changes in your symptoms.  It is up to you to get the results of any tests that were done. Ask your health care provider, or the department that is doing the tests, when your results will be ready.  Keep all follow-up visits as told by your health care provider. This is important.  You may be asked to go for further testing if your chest pain does not go away.  Contact a health care provider if:  Your chest pain does not go away.  You feel depressed.  You have a fever.  You notice changes in your symptoms or develop new symptoms.  Get help right away if:  Your chest pain gets worse.  You have a cough that gets worse, or you cough up blood.  You have severe pain in your abdomen.  You faint.  You have sudden, unexplained chest discomfort.  You have sudden, unexplained discomfort in your arms, back, neck, or jaw.  You have shortness of breath at any time.  You suddenly start to sweat, or your skin gets clammy.  You feel nausea or you vomit.  You suddenly feel lightheaded or dizzy.  You have severe weakness, or unexplained weakness or fatigue.  Your heart begins to beat quickly, or it feels like it is skipping beats.  These symptoms may represent a serious problem that is an emergency. Do not wait to see if the symptoms will go away. Get medical help right away. Call your local emergency services (911 in the [place] Do not drive yourself to the hospital.    Summary  Chest pain can be caused by a condition that is serious and requires urgent treatment. It may also be caused by something that is not life-threatening.  Your health care provider may do lab tests and other studies to find the cause of your pain.  Follow your health care provider's instructions on taking medicines, making lifestyle changes, and getting emergency treatment if symptoms become worse.  Keep all follow-up visits as told by your health care provider. This includes visits for any further testing if your chest pain does not go away.  This information is not intended to replace advice given to you by your health care provider. Make sure you discuss any questions you have with your health care provider.

## 2023-05-08 NOTE — ED PROVIDER NOTE - OBJECTIVE STATEMENT
37-year-old female with high blood pressure high cholesterol diabetes presents with on and off chest pain for the last few days.  She is states that the pain lasts a few seconds then resolves after she is rubbing her chest.  She is not sure if with some gas related pain or cardiac pain.  Initially the pain was in the substernal region and today it was more in the right side.  In the upper chest.  No shortness of breath no dizziness patient's not on birth control.  No recent travel.

## 2023-05-08 NOTE — ED ADULT NURSE NOTE - NSIMPLEMENTINTERV_GEN_ALL_ED
Implemented All Universal Safety Interventions:  [place] to call system. Call bell, personal items and telephone within reach. Instruct patient to call for assistance. Room bathroom lighting operational. Non-slip footwear when patient is off stretcher. Physically safe environment: no spills, clutter or unnecessary equipment. Stretcher in lowest position, wheels locked, appropriate side rails in place.

## 2023-05-08 NOTE — ED PROVIDER NOTE - EKG ADDITIONAL QUESTION - PERFORMED INDEPENDENT VISUALIZATION
What Type Of Note Output Would You Prefer (Optional)?: Standard Output
Yes
Is The Patient Presenting As Previously Scheduled?: No, they are coming in before their scheduled appointment
How Severe Is Your Rash?: moderate
Is This A New Presentation, Or A Follow-Up?: Rash

## 2023-05-08 NOTE — ED PROVIDER NOTE - PATIENT PORTAL LINK FT
You can access the FollowMyHealth Patient Portal offered by [facility] by registering at the following website: [email or web address]. By joining [facility]’s FollowMyHealth portal, you will also be able to view your health information using other applications (apps) compatible with our system.

## 2023-05-09 VITALS
TEMPERATURE: 98 F | DIASTOLIC BLOOD PRESSURE: 74 MMHG | RESPIRATION RATE: 18 BRPM | SYSTOLIC BLOOD PRESSURE: 121 MMHG | HEART RATE: 78 BPM | OXYGEN SATURATION: 98 %

## 2023-05-17 NOTE — ED ADULT NURSE NOTE - DISCHARGE DATE/TIME
OB Hx:  x 2. Largest 6-0               SAB x 1. D&C x _          Year? GA? /CS? Sex? Weight? Hospital? Complications? Epidural?  G1   G2  G3    Gyn Hx:  Denies cysts, fibroids, abnormal paps, and STIs. 19-Mar-2019 04:17

## 2023-06-26 ENCOUNTER — NON-APPOINTMENT (OUTPATIENT)
Age: 38
End: 2023-06-26

## 2023-06-26 ENCOUNTER — APPOINTMENT (OUTPATIENT)
Dept: PULMONOLOGY | Facility: CLINIC | Age: 38
End: 2023-06-26
Payer: COMMERCIAL

## 2023-06-26 VITALS
HEIGHT: 61 IN | HEART RATE: 88 BPM | WEIGHT: 179 LBS | DIASTOLIC BLOOD PRESSURE: 80 MMHG | OXYGEN SATURATION: 98 % | TEMPERATURE: 97.4 F | BODY MASS INDEX: 33.79 KG/M2 | SYSTOLIC BLOOD PRESSURE: 120 MMHG | RESPIRATION RATE: 16 BRPM

## 2023-06-26 PROCEDURE — 95012 NITRIC OXIDE EXP GAS DETER: CPT

## 2023-06-26 PROCEDURE — 99214 OFFICE O/P EST MOD 30 MIN: CPT | Mod: 25

## 2023-06-26 PROCEDURE — 94010 BREATHING CAPACITY TEST: CPT

## 2023-06-26 RX ORDER — OLOPATADINE HYDROCHLORIDE 665 UG/1
0.6 SPRAY, METERED NASAL
Qty: 3 | Refills: 1 | Status: ACTIVE | COMMUNITY
Start: 2023-06-26 | End: 1900-01-01

## 2023-06-26 NOTE — ASSESSMENT
[FreeTextEntry1] : Ms. Blue is a 37 year old female with a history of asthma / GERD / Allergy / overweight.  Her asthma is active and is both IgE mediated and eosinophil mediated (currently off on Nucala), and s/p delivery (7/12/2019) - s/p COVID-19 12/2021, 12/2022 and 6/2022 now improved on Nucala.- (controlled) -  active asthma 1/2022- @baseline- off nucala since 4/2023 (due to insurance) \par \par problem 1: severe persistent asthma (controlled)\par -add Symbicort 160 2 inhalations BID \par *climate related - GERD - avoid mold exposure\par -Continue albuterol by the nebulizer QID\par -continue Pulmicort (Budesonide) (0.5) BID by nebulizer, PRN  (gargle and rinse after use)\par -continue to use Ventolin PRN and before exercise\par -Continue QVar 80 at 2 inhalations BID\par -Continue Spiriva 2 inhalations QD\par -continue to use Singulair 10 mg before bed\par -Inhaler technique reviewed as well as oral hygiene techniques reviewed with patient. Avoidance of cold air, extremes of temperature, rescue inhaler should be used before exercise. Order of medication reviewed with patient. Recommended use of a cool mist humidifier in the bedroom. \par \par problem 2: Allergic Asthma (Elevated IgE)\par -her elevated IgE makes her a candidate for Xolair\par \par Problem 3: Eosinophilic Asthma \par - off all - Restart [facility]ala - [facility] \par -The safety and efficacy of Nucala was established in three double-blind, randomized, placebo-controlled trials in patients with severe asthma. Compared to a placebo, patients with severe asthma receiving Nucala had fewer exacerbation requiring hospitalization and/or emergency department visits, and a longer time to first exacerbation. In addition, patients with severe asthma receiving Nucala or Fasenra experienced greater reductions in their daily maintenance oral corticosteroid dose, while maintaining asthma control compared with patients receiving placebo. Treatment with Nucala did not result in a significant improvement in lung function, as measured by the volume of air exhaled by patients in one second. The most common side effects include: headache, injection site reactions, back pain, weakness, and fatigue; hypersensitivity reactions can occur within hours or days including swelling of the face, mouth, and tongue, fainting, dizziness, hives, breathing problems, and rash; herpes zoster infections have occurred. The drug is a monoclonal antibody that inhibits interleukin-5 which helps regular eosinophils, a type of white blood cell that contributes to asthma. The over-production of eosinophils can cause inflammation in the lungs, increasing the frequency of asthma attacks. Patients must also take other medications, including high dose inhaled corticosteroids and at least one additional asthma drug\par \par problem 4: allergic rhinitis\par -add Claritin 10 mg QAM \par -continue to use Flonase 1 sniff each nostril BID\par -continue to use Xyzal 5 mg before bed \par -Environmental measures for allergies were encouraged including mattress and pillow cover, air purifier, and environmental controls.\par \par problem 5: low vitamin D\par -continue to use supplemental vitamin D\par -Has been associated with asthma exacerbations and increased allergic symptoms. The goal based on recent information is maintaining levels between 50-70 and low normal is 30. Recommended 50,000 units every two weeks to once a month depending on the level. \par \par problem 6: GERD -(stable/controlled)\par -Add Pepcid 40 mg QHS \par -Rule of 2s: avoid eating too much, eating too late, eating too spicy, eating two hours before bed\par -Things to avoid including overeating, spicy foods, tight clothing, eating within three hours of bed, this list is not all inclusive. \par -For treatment of reflux, possible options discussed including diet control, H2 blockers, PPIs, as well as coating motility agents discussed as treatment options. Timing of meals and proximity of last meal to sleep were discussed. If symptoms persist, a formal gastrointestinal evaluation is needed. \par \par problem 7: overweight - in progress\par - Recommended "10-Day Detox" by Zeke Chris for 2-3 weeks followed by probiotics \par -Recommend "Muniq" OTC Supplement \par -Weight loss, exercise, and diet control were discussed and are highly encouraged. Treatment options were given such as, aqua therapy, and contacting a nutritionist. Recommended to use the elliptical, stationary bike, less use of treadmill. \par \par Problem 8: Health Maintenance/COVID19 Precautions : s/p COVID-19 infection 12/2020 and 6/2022, 12/2022\par -recommended SaNOtize nasal spray \par - S/p Covid 19 vaccine (Pfizer) x3 \par - Clean your hands often. Wash your hands often with soap and water for at least 20 seconds, especially after blowing your nose, coughing, or sneezing, or having been in a public place.\par - If soap and water are not available, use a hand  that contains at least 60% alcohol.\par - To the extent possible, avoid touching high-touch surfaces in public places - elevator buttons, door handles, handrails, handshaking with people, etc. Use a tissue or your sleeve to cover your hand or finger if you must touch something.\par - Wash your hands after touching surfaces in public places.\par - Avoid touching your face, nose, eyes, etc.\par - Clean and disinfect your home to remove germs: practice routine cleaning of frequently touched surfaces (for example: tables, doorknobs, light switches, handles, desks, toilets, faucets, sinks & cell phones)\par - Avoid crowds, especially in poorly ventilated spaces. Your risk of exposure to respiratory viruses like COVID-19 may increase in crowded, closed-in settings with little air circulation if there are people in the crowd who are sick. All patients are recommended to practice social distancing and stay at least 6 feet away from others. \par - Avoid all non-essential travel including plane trips, and especially avoid embarking on cruise ships.\par -If COVID-19 is spreading in your community, take extra measures to put distance between yourself and other people to further reduce your risk of being exposed to this new virus.\par -Stay home as much as possible.\par - Consider ways of getting food brought to your house through family, social, or commercial networks\par -Be aware that the virus has been known to live in the air up to 3 hours post exposure, cardboard up to 24 hours post exposure, copper up to 4 hours post exposure, steel and plastic up to 2-3 days post exposure. Risk of transmission from these surfaces are affected by many variables.\par COVID-19 precautionary Immune Support Recommendations:\par -OTC Vitamin C 500mg BID \par -OTC Quercetin 250-500mg BID \par -OTC Zinc 75-100mg per day \par -OTC Melatonin 1 or 2mg a night \par -OTC Vitamin D 1-4000mg per day \par -OTC Tonic Water 8oz per day\par -Water 0.5-1 gallon per day\par Asthma and COVID19:\par You need to make sure your asthma is under control. This often requires the use of inhaled corticosteroids (and sometimes oral corticosteroids). Inhaled corticosteroids do not likely reduce your immune system’s ability to fight infections, but oral corticosteroids may. It is important to use the steps above to protect yourself to limit your exposure to any respiratory virus. \par \par problem 9: health maintenance \par -recommended yearly flu shot after October 15 (9/15/2021)\par -recommended strep pneumonia vaccines: Prevnar-13 vaccine, followed by Pneumo vaccine 23 one year following\par -recommended early intervention for URIs\par -recommended regular osteoporosis evaluations\par -recommended early dermatological evaluations\par -recommended after the age of 50 to the age of 70, colonoscopy every 5 years \par \par Patient may follow up in 2 months with SPI and NiOx\par She is encouraged to call with any changes, concerns, or questions

## 2023-06-26 NOTE — ADDENDUM
[FreeTextEntry1] : Documented by Dewayne Campos acting as a scribe for Dr. Zac Kelley on (06/26/2023).\par \par All medical record entries made by the Scribe were at my, Dr. Zac Kelley's, direction and personally dictated by me on (06/26/2023). I have reviewed the chart and agree that the record accurately reflects my personal performance of the history, physical exam, assessment and plan. I have personally directed, reviewed, and agree with the discharge instructions.

## 2023-06-26 NOTE — PROCEDURE
[FreeTextEntry1] : PFT's were performed for the evaluation of Asthma\par \par PFT reveals normal flows, with an FEV1 of  2.05L, which is 74% of predicted, with a normal flow volume loop \par \par FENO was 59; a normal value being less than 25\par Fractional exhaled nitric oxide (FENO) is regarded as a simple, noninvasive method for assessing eosinophilic airway inflammation. Produced by a variety of cells within the lung, nitric oxide (NO) concentrations are generally low in healthy individuals. However, high concentrations of NO appear to be involved in nonspecific host defense mechanisms and chronic inflammatory diseases such as asthma. The American Thoracic Society (ATS) therefore has recommended using FENO to aid in the diagnosis and monitoring of eosinophilic airway inflammation and asthma, and for identifying steroid responsive individuals whose chronic respiratory symptoms may be caused by airway inflammation.

## 2023-06-26 NOTE — HISTORY OF PRESENT ILLNESS
[FreeTextEntry1] : Ms. Blue is a 37 year old female with a history of allergic eosinophilia, eosinophilic asthma, allergic rhinitis, elevated IgE, severe persistent asthma and vitamin D deficiency presenting today for a follow up visit. Her chief complaint is \par - she notes feeling well in general \par - she notes going to the gym 3-4 times a week \par - she notes weight is stable \par - she notes energy level is good\par - she denies any visual issues \par - she notes getting enough sleep \par - she notes menstrual cycle is regular \par - she denies taking any new medications, vitamins, or supplements \par \par - She  denies any headaches, nausea, vomiting, fever, chills, sweats, chest pains, chest pressure, diarrhea, constipation, dysphagia, myalgia, dizziness, leg swelling, leg pain, itchy eyes, itchy ears, heartburn, reflux, or sour taste in the mouth.

## 2023-07-02 ENCOUNTER — RX RENEWAL (OUTPATIENT)
Age: 38
End: 2023-07-02

## 2023-07-02 RX ORDER — FAMOTIDINE 40 MG/1
40 TABLET, FILM COATED ORAL
Qty: 90 | Refills: 1 | Status: ACTIVE | COMMUNITY
Start: 2022-08-24 | End: 1900-01-01

## 2023-07-26 NOTE — ED ADULT TRIAGE NOTE - CHIEF COMPLAINT QUOTE
walked in with  c/o  chest  pain started  last staurday Methotrexate Pregnancy And Lactation Text: This medication is Pregnancy Category X and is known to cause fetal harm. This medication is excreted in breast milk.

## 2023-08-03 NOTE — ED PROVIDER NOTE - PROGRESS NOTE
Case Management Assessment  Initial Evaluation    Date/Time of Evaluation: 7/28/2023 10:47 AM  Assessment Completed by: TAMIKO Barillas    If patient is discharged prior to next notation, then this note serves as note for discharge by case management. Patient Name: Nicole Arboleda                   YOB: 1941  Diagnosis: Acute on chronic diastolic CHF (congestive heart failure) ([address]) [I50.33]                   Date / Time: 7/28/2023  4:48 AM    Patient Admission Status: Inpatient   Readmission Risk (Low < 19, Mod (19-27), High > 27): Readmission Risk Score: 19    Current PCP: Mai Lu, DO  PCP verified by CM? (P) Yes    Chart Reviewed: Yes      History Provided by: (P) Patient  Patient Orientation: (P) Alert and Oriented    Patient Cognition: (P) Alert    Hospitalization in the last 30 days (Readmission):  No    If yes, Readmission Assessment in CM Navigator will be completed.     Advance Directives:      Code Status: Prior   Patient's Primary Decision Maker is: (P) Legal Next of Kin    Primary Decision Maker: Cecilia Stern Spouse - [phone]    Discharge Planning:    Patient lives with: (P) Spouse/Significant Other Type of Home: (P) House  Primary Care Giver: (P) Self  Patient Support Systems include: (P) Spouse/Significant Other, Family Members, Friends/Neighbors   Current Financial resources: (P) None  Current community resources: (P) None  Current services prior to admission: (P) Durable Medical Equipment            Current DME: (P) Cane, Crutches, Walker            Type of Home Care services:  (P) None    ADLS  Prior functional level: (P) Independent in ADLs/IADLs  Current functional level: (P) Independent in ADLs/IADLs    PT AM-PAC:   /24  OT AM-PAC:   /24    Family can provide assistance at DC: (P) Yes  Would you like Case Management to discuss the discharge plan with any other family members/significant others, and if so, who? (P) No  Plans to Return to Present Housing: (P)
Discharge order noted. Spoke with MD who states patient is discharging today with no needs from CM.
Spoke with patient at bedside for introduction. Discussed that he will be going to the cath lab tomorrow for PCI/stent placement and possible pacemaker on the same day. He verbalized understanding with no questions or concerns. Patient is from home with his wife and normally independent at home. He is up walking the halls at this time.
Improved.

## 2023-08-28 ENCOUNTER — OUTPATIENT (OUTPATIENT)
Dept: OUTPATIENT SERVICES | Facility: HOSPITAL | Age: 38
LOS: 1 days | End: 2023-08-28
Payer: COMMERCIAL

## 2023-08-28 ENCOUNTER — APPOINTMENT (OUTPATIENT)
Dept: CV DIAGNOSTICS | Facility: HOSPITAL | Age: 38
End: 2023-08-28

## 2023-08-28 ENCOUNTER — APPOINTMENT (OUTPATIENT)
Dept: CV DIAGNOSITCS | Facility: HOSPITAL | Age: 38
End: 2023-08-28

## 2023-08-28 DIAGNOSIS — I10 ESSENTIAL (PRIMARY) HYPERTENSION: ICD-10-CM

## 2023-08-28 PROCEDURE — 93306 TTE W/DOPPLER COMPLETE: CPT | Mod: 26

## 2023-08-28 PROCEDURE — 93356 MYOCRD STRAIN IMG SPCKL TRCK: CPT

## 2023-08-28 PROCEDURE — 93017 CV STRESS TEST TRACING ONLY: CPT

## 2023-08-28 PROCEDURE — 93016 CV STRESS TEST SUPVJ ONLY: CPT

## 2023-08-28 PROCEDURE — 76376 3D RENDER W/INTRP POSTPROCES: CPT

## 2023-08-28 PROCEDURE — 93018 CV STRESS TEST I&R ONLY: CPT

## 2023-08-28 PROCEDURE — 93306 TTE W/DOPPLER COMPLETE: CPT

## 2023-08-31 RX ORDER — EPINEPHRINE 0.3 MG/.3ML
0.3 INJECTION INTRAMUSCULAR
Qty: 1 | Refills: 2 | Status: ACTIVE | COMMUNITY
Start: 2019-12-04 | End: 1900-01-01

## 2023-09-06 ENCOUNTER — OUTPATIENT (OUTPATIENT)
Dept: OUTPATIENT SERVICES | Facility: HOSPITAL | Age: 38
LOS: 1 days | End: 2023-09-06
Payer: COMMERCIAL

## 2023-09-06 ENCOUNTER — APPOINTMENT (OUTPATIENT)
Dept: INTERNAL MEDICINE | Facility: CLINIC | Age: 38
End: 2023-09-06
Payer: COMMERCIAL

## 2023-09-06 VITALS
HEIGHT: 62 IN | HEART RATE: 87 BPM | SYSTOLIC BLOOD PRESSURE: 128 MMHG | OXYGEN SATURATION: 98 % | DIASTOLIC BLOOD PRESSURE: 88 MMHG | BODY MASS INDEX: 33.13 KG/M2 | WEIGHT: 180 LBS

## 2023-09-06 DIAGNOSIS — I10 ESSENTIAL (PRIMARY) HYPERTENSION: ICD-10-CM

## 2023-09-06 DIAGNOSIS — Z00.00 ENCOUNTER FOR GENERAL ADULT MEDICAL EXAMINATION W/OUT ABNORMAL FINDINGS: ICD-10-CM

## 2023-09-06 DIAGNOSIS — U07.1 COVID-19: ICD-10-CM

## 2023-09-06 DIAGNOSIS — E78.5 HYPERLIPIDEMIA, UNSPECIFIED: ICD-10-CM

## 2023-09-06 DIAGNOSIS — R63.5 ABNORMAL WEIGHT GAIN: ICD-10-CM

## 2023-09-06 DIAGNOSIS — E11.9 TYPE 2 DIABETES MELLITUS W/OUT COMPLICATIONS: ICD-10-CM

## 2023-09-06 PROCEDURE — 99395 PREV VISIT EST AGE 18-39: CPT

## 2023-09-06 PROCEDURE — G0463: CPT

## 2023-09-10 PROBLEM — U07.1 COVID-19 VIRUS DETECTED: Status: RESOLVED | Noted: 2020-12-04 | Resolved: 2023-09-10

## 2023-09-10 NOTE — REVIEW OF SYSTEMS
[Recent Change In Weight] : ~T recent weight change [Anxiety] : anxiety [Negative] : Musculoskeletal [Fever] : no fever [Chills] : no chills [Fatigue] : no fatigue [Chest Pain] : no chest pain [Palpitations] : no palpitations [Shortness Of Breath] : no shortness of breath [Cough] : no cough [Dyspnea on Exertion] : no dyspnea on exertion [Abdominal Pain] : no abdominal pain [Nausea] : no nausea [Constipation] : no constipation [Diarrhea] : diarrhea [Vomiting] : no vomiting [Heartburn] : no heartburn [Melena] : no melena [Dysuria] : no dysuria [Vaginal Discharge] : no vaginal discharge [Skin Rash] : no skin rash [Headache] : no headache [Dizziness] : no dizziness [Fainting] : no fainting [Depression] : no depression [Easy Bleeding] : no easy bleeding [Easy Bruising] : no easy bruising

## 2023-09-10 NOTE — PHYSICAL EXAM
[No Acute Distress] : no acute distress [Well Nourished] : well nourished [Well Developed] : well developed [Well-Appearing] : well-appearing [PERRL] : pupils equal round and reactive to light [EOMI] : extraocular movements intact [Normal Oropharynx] : the oropharynx was normal [Normal TMs] : both tympanic membranes were normal [No Lymphadenopathy] : no lymphadenopathy [Supple] : supple [Thyroid Normal, No Nodules] : the thyroid was normal and there were no nodules present [No Respiratory Distress] : no respiratory distress  [No Accessory Muscle Use] : no accessory muscle use [Clear to Auscultation] : lungs were clear to auscultation bilaterally [Normal Rate] : normal rate  [Regular Rhythm] : with a regular rhythm [Normal S1, S2] : normal S1 and S2 [No Murmur] : no murmur heard [No Carotid Bruits] : no carotid bruits [Pedal Pulses Present] : the pedal pulses are present [No Edema] : there was no peripheral edema [Normal Appearance] : normal in appearance [No Nipple Discharge] : no nipple discharge [No Axillary Lymphadenopathy] : no axillary lymphadenopathy [Soft] : abdomen soft [Non Tender] : non-tender [Non-distended] : non-distended [No Masses] : no abdominal mass palpated [No HSM] : no HSM [Normal Bowel Sounds] : normal bowel sounds [Normal Supraclavicular Nodes] : no supraclavicular lymphadenopathy [Normal Posterior Cervical Nodes] : no posterior cervical lymphadenopathy [Normal Axillary Nodes] : no axillary lymphadenopathy [Normal Anterior Cervical Nodes] : no anterior cervical lymphadenopathy [Normal Inguinal Nodes] : no inguinal lymphadenopathy [No Joint Swelling] : no joint swelling [No Rash] : no rash [Normal Gait] : normal gait [Normal Affect] : the affect was normal [Right Foot Was Examined] : Right foot ~C was examined [Left Foot Was Examined] : left foot ~C was examined [None] : no ulcers in either foot were found [] : both feet [de-identified] : mild generalized subcutaneous fullness posterior neck, no discrete masses palpated [de-identified] : scattered tattoos (old)

## 2023-09-10 NOTE — HEALTH RISK ASSESSMENT
[Patient reported PAP Smear was normal] : Patient reported PAP Smear was normal [0] : 2) Feeling down, depressed, or hopeless: Not at all (0) [MammogramComments] : hasn't had [PapSmearDate] : 12/18 [PapSmearComments] : awaiting in September/October [ColonoscopyComments] : hasn't had

## 2023-09-10 NOTE — ASSESSMENT
[FreeTextEntry1] : 36 yo female with h/o as above including severe persistent/eosinophilic asthma, HTN, DM, hyperlipidemia, GERD, anxiety, here for CPE. 1.  Pulm - asthma under better control with medications and nucala (waiting on tezpire), followed by pulm; pt to check if had prevnar 20 with pulm (as believes had another pneumonia vaccine with pulm) otherwise would give prevnar 20 2.  Endo - check A1c and microalbumin, check vitamin b12 on metformin; foot exam nl, sees optho; check TSH for weight gain and advised low carb diet 3.  CV - bp at goal, had recent bmp; check lipids on statin 4.  GI - on pepcid for GERD; not yet due for colonoscopy 5.  Gyn - will f/up with gyn for pap as due; not yet due for mammo 6.  Neck - suspect prominent subcutaneous tissue posterior neck, but if enlarges further can consider imaging for eval 7.  HCM - check other below labs, had nl cbc and cmp done recently in ER, check quant gold for work; consider prevnar 20, covid booster, flu shot 8.  RTO 6 months DM and bp f/up

## 2023-09-10 NOTE — HISTORY OF PRESENT ILLNESS
[FreeTextEntry1] : physical [de-identified] : 38 yo female with h/o as below here for CPE. In May was getting chest pain, went to ER, had testing done, asked if was panic attack but never had one before, and hasn't happened since then.  Had labs (nl cbc, negative ddimer, nl cmp, negative troponin and negative pro-BNP), nl cxr, and ECG done.  Just had stress and echo done with cardiology - nl echo and stress. Otherwise feeling well.  Asthma under control.  Nucala not covered by insurance so will switch to tezpire, hasn't started yet. Doesn't check FSG consistently, fasting FSG will be < 100, never less than 70, no FSG > 200.  Joined gym.  Every year feels gaining more weight.  Trying to eat healthy as much as can but difficult to maintain.  Does eat a lot of carbs, can try to cut that down.  Sees optho every 2 years, doesn't see podiatrist. Checking bp, bp 120/80, 128-130, diastolic never >90, systolic never > 140. No other active issues.  Asking about fullness posterior neck.

## 2023-09-25 ENCOUNTER — LABORATORY RESULT (OUTPATIENT)
Age: 38
End: 2023-09-25

## 2023-09-27 ENCOUNTER — TRANSCRIPTION ENCOUNTER (OUTPATIENT)
Age: 38
End: 2023-09-27

## 2023-09-28 ENCOUNTER — TRANSCRIPTION ENCOUNTER (OUTPATIENT)
Age: 38
End: 2023-09-28

## 2023-09-28 LAB
25(OH)D3 SERPL-MCNC: 25.8 NG/ML
CHOLEST SERPL-MCNC: 171 MG/DL
ESTIMATED AVERAGE GLUCOSE: 148 MG/DL
HBA1C MFR BLD HPLC: 6.8 %
HDLC SERPL-MCNC: 56 MG/DL
LDLC SERPL CALC-MCNC: 90 MG/DL
M TB IFN-G BLD-IMP: NEGATIVE
NONHDLC SERPL-MCNC: 114 MG/DL
QUANTIFERON TB PLUS MITOGEN MINUS NIL: 8.63 IU/ML
QUANTIFERON TB PLUS NIL: 0.05 IU/ML
QUANTIFERON TB PLUS TB1 MINUS NIL: 0 IU/ML
QUANTIFERON TB PLUS TB2 MINUS NIL: 0 IU/ML
TRIGL SERPL-MCNC: 142 MG/DL
TSH SERPL-ACNC: 2.39 UIU/ML
VIT B12 SERPL-MCNC: 523 PG/ML

## 2023-10-09 ENCOUNTER — APPOINTMENT (OUTPATIENT)
Dept: PULMONOLOGY | Facility: CLINIC | Age: 38
End: 2023-10-09
Payer: COMMERCIAL

## 2023-10-09 VITALS
WEIGHT: 180 LBS | TEMPERATURE: 97.1 F | HEIGHT: 62 IN | RESPIRATION RATE: 16 BRPM | SYSTOLIC BLOOD PRESSURE: 130 MMHG | DIASTOLIC BLOOD PRESSURE: 80 MMHG | HEART RATE: 82 BPM | BODY MASS INDEX: 33.13 KG/M2 | OXYGEN SATURATION: 98 %

## 2023-10-09 DIAGNOSIS — R76.8 OTHER SPECIFIED ABNORMAL IMMUNOLOGICAL FINDINGS IN SERUM: ICD-10-CM

## 2023-10-09 DIAGNOSIS — Z23 ENCOUNTER FOR IMMUNIZATION: ICD-10-CM

## 2023-10-09 PROCEDURE — 94010 BREATHING CAPACITY TEST: CPT

## 2023-10-09 PROCEDURE — 99214 OFFICE O/P EST MOD 30 MIN: CPT | Mod: 25

## 2023-10-09 PROCEDURE — G0008: CPT

## 2023-10-09 PROCEDURE — 95012 NITRIC OXIDE EXP GAS DETER: CPT

## 2023-10-09 PROCEDURE — 94727 GAS DIL/WSHOT DETER LNG VOL: CPT

## 2023-10-09 PROCEDURE — 90682 RIV4 VACC RECOMBINANT DNA IM: CPT

## 2023-10-09 PROCEDURE — 94729 DIFFUSING CAPACITY: CPT

## 2023-10-13 ENCOUNTER — RX RENEWAL (OUTPATIENT)
Age: 38
End: 2023-10-13

## 2023-10-17 RX ORDER — DESLORATADINE 5 MG/1
5 TABLET ORAL
Qty: 90 | Refills: 1 | Status: ACTIVE | COMMUNITY
Start: 2018-09-20 | End: 1900-01-01

## 2023-10-18 ENCOUNTER — TRANSCRIPTION ENCOUNTER (OUTPATIENT)
Age: 38
End: 2023-10-18

## 2023-10-24 NOTE — DISCUSSION/SUMMARY
Consent: Written consent obtained, risks reviewed including but not limited to crusting, scabbing, blistering, scarring, darker or lighter pigmentary change, incidental hair removal, bruising, and/or incomplete removal. Treatment Number: 6 Location #1: Face [FreeTextEntry1] : Patient is a 33 year old female with a history of gestational HTN, preeclampsia,  allergic eosinophilia, allergic rhinitis, elevated IgE, prophylactic measure, severe persistent asthma and vitamin D deficiency presenting today to establish care in setting of recent hospitalization for an asthmatic attack. She is currently s/p vaginal  delivery on 7/12/19 at 37 weeks for elevated BPs - discharged on the procardia 90 mg.  She has no complaints at this time. \par \par - 1st pregnancy - gestational HTN - was on procardia with subsequent preeclampsia - at 38 weeks vaginal delivery 2014. Post the delivery stayed on meds - was changed to Benicar and then switched back to procardia when became pregnant\par - on procardia with BPs well controlled at home; will decrease procardia to 60 mg and closely watch the BP at home\par - ECG with no acute changes\par - echo done 3/2019 - with normal systolic and diastolic heart failure\par  Session Time: 02:26 Total Square Area In Cm2 (Whole Numbers Only Please): 80 cm2 Total Energy In Joules: 15.44 J Post-Care Instructions: I reviewed with the patient in detail post-care instructions. Patient should stay away from the sun and wear sun protection until treated areas are fully healed. Dose Setting #1 (Mj/Cm2): 193 Treatment Time: 00:03 Comments: Per patient she wanted to start at 175 mj Patient Id:  % Increase/Decrease From Last Treatment: +0% Billing: [id number] (Unlisted special dermatological service or procedure) Detail Level: Detailed Total Pulses (Will Not Render If 0): 0

## 2023-11-06 ENCOUNTER — RX RENEWAL (OUTPATIENT)
Age: 38
End: 2023-11-06

## 2023-11-06 RX ORDER — ROSUVASTATIN CALCIUM 5 MG/1
5 TABLET, FILM COATED ORAL
Qty: 90 | Refills: 3 | Status: ACTIVE | COMMUNITY
Start: 2022-08-12 | End: 1900-01-01

## 2023-12-14 ENCOUNTER — APPOINTMENT (OUTPATIENT)
Dept: PULMONOLOGY | Facility: CLINIC | Age: 38
End: 2023-12-14

## 2024-01-10 ENCOUNTER — RX RENEWAL (OUTPATIENT)
Age: 39
End: 2024-01-10

## 2024-02-04 ENCOUNTER — RX RENEWAL (OUTPATIENT)
Age: 39
End: 2024-02-04

## 2024-02-04 RX ORDER — HYDROCHLOROTHIAZIDE 12.5 MG/1
12.5 TABLET ORAL
Qty: 90 | Refills: 1 | Status: ACTIVE | COMMUNITY
Start: 2020-06-17 | End: 1900-01-01

## 2024-03-07 ENCOUNTER — RX RENEWAL (OUTPATIENT)
Age: 39
End: 2024-03-07

## 2024-03-11 ENCOUNTER — APPOINTMENT (OUTPATIENT)
Dept: INTERNAL MEDICINE | Facility: CLINIC | Age: 39
End: 2024-03-11

## 2024-03-15 ENCOUNTER — TRANSCRIPTION ENCOUNTER (OUTPATIENT)
Age: 39
End: 2024-03-15

## 2024-03-18 ENCOUNTER — TRANSCRIPTION ENCOUNTER (OUTPATIENT)
Age: 39
End: 2024-03-18

## 2024-03-27 ENCOUNTER — TRANSCRIPTION ENCOUNTER (OUTPATIENT)
Age: 39
End: 2024-03-27

## 2024-03-27 ENCOUNTER — EMERGENCY (EMERGENCY)
Facility: HOSPITAL | Age: 39
LOS: 1 days | Discharge: HOME | End: 2024-03-27
Attending: EMERGENCY MEDICINE
Payer: COMMERCIAL

## 2024-03-27 VITALS
HEART RATE: 111 BPM | SYSTOLIC BLOOD PRESSURE: 139 MMHG | DIASTOLIC BLOOD PRESSURE: 90 MMHG | TEMPERATURE: 98 F | RESPIRATION RATE: 18 BRPM | OXYGEN SATURATION: 94 %

## 2024-03-27 VITALS
WEIGHT: 175.27 LBS | TEMPERATURE: 98 F | SYSTOLIC BLOOD PRESSURE: 159 MMHG | OXYGEN SATURATION: 92 % | HEART RATE: 104 BPM | DIASTOLIC BLOOD PRESSURE: 100 MMHG | RESPIRATION RATE: 22 BRPM

## 2024-03-27 LAB
ALBUMIN SERPL ELPH-MCNC: 3.3 G/DL — LOW (ref 3.5–5)
ALP SERPL-CCNC: 80 U/L — SIGNIFICANT CHANGE UP (ref 40–120)
ALT FLD-CCNC: 20 U/L DA — SIGNIFICANT CHANGE UP (ref 10–60)
ANION GAP SERPL CALC-SCNC: 5 MMOL/L — SIGNIFICANT CHANGE UP (ref 5–17)
AST SERPL-CCNC: 13 U/L — SIGNIFICANT CHANGE UP (ref 10–40)
BASOPHILS # BLD AUTO: 0.05 K/UL — SIGNIFICANT CHANGE UP (ref 0–0.2)
BASOPHILS NFR BLD AUTO: 0.4 % — SIGNIFICANT CHANGE UP (ref 0–2)
BILIRUB SERPL-MCNC: 0.4 MG/DL — SIGNIFICANT CHANGE UP (ref 0.2–1.2)
BUN SERPL-MCNC: 8 MG/DL — SIGNIFICANT CHANGE UP (ref 7–18)
CALCIUM SERPL-MCNC: 8.9 MG/DL — SIGNIFICANT CHANGE UP (ref 8.4–10.5)
CHLORIDE SERPL-SCNC: 109 MMOL/L — HIGH (ref 96–108)
CO2 SERPL-SCNC: 25 MMOL/L — SIGNIFICANT CHANGE UP (ref 22–31)
CREAT SERPL-MCNC: 0.78 MG/DL — SIGNIFICANT CHANGE UP (ref 0.5–1.3)
EGFR: 99 ML/MIN/1.73M2 — SIGNIFICANT CHANGE UP
EOSINOPHIL # BLD AUTO: 1.48 K/UL — HIGH (ref 0–0.5)
EOSINOPHIL NFR BLD AUTO: 11.3 % — HIGH (ref 0–6)
GLUCOSE SERPL-MCNC: 105 MG/DL — HIGH (ref 70–99)
HCG SERPL-ACNC: <1 MIU/ML — SIGNIFICANT CHANGE UP
HCT VFR BLD CALC: 41.4 % — SIGNIFICANT CHANGE UP (ref 34.5–45)
HGB BLD-MCNC: 13.4 G/DL — SIGNIFICANT CHANGE UP (ref 11.5–15.5)
IMM GRANULOCYTES NFR BLD AUTO: 0.2 % — SIGNIFICANT CHANGE UP (ref 0–0.9)
LYMPHOCYTES # BLD AUTO: 1.83 K/UL — SIGNIFICANT CHANGE UP (ref 1–3.3)
LYMPHOCYTES # BLD AUTO: 14 % — SIGNIFICANT CHANGE UP (ref 13–44)
MCHC RBC-ENTMCNC: 28.2 PG — SIGNIFICANT CHANGE UP (ref 27–34)
MCHC RBC-ENTMCNC: 32.4 GM/DL — SIGNIFICANT CHANGE UP (ref 32–36)
MCV RBC AUTO: 87 FL — SIGNIFICANT CHANGE UP (ref 80–100)
MONOCYTES # BLD AUTO: 0.75 K/UL — SIGNIFICANT CHANGE UP (ref 0–0.9)
MONOCYTES NFR BLD AUTO: 5.7 % — SIGNIFICANT CHANGE UP (ref 2–14)
NEUTROPHILS # BLD AUTO: 8.94 K/UL — HIGH (ref 1.8–7.4)
NEUTROPHILS NFR BLD AUTO: 68.4 % — SIGNIFICANT CHANGE UP (ref 43–77)
NRBC # BLD: 0 /100 WBCS — SIGNIFICANT CHANGE UP (ref 0–0)
PLATELET # BLD AUTO: 317 K/UL — SIGNIFICANT CHANGE UP (ref 150–400)
POTASSIUM SERPL-MCNC: 4.3 MMOL/L — SIGNIFICANT CHANGE UP (ref 3.5–5.3)
POTASSIUM SERPL-SCNC: 4.3 MMOL/L — SIGNIFICANT CHANGE UP (ref 3.5–5.3)
PROT SERPL-MCNC: 7.4 G/DL — SIGNIFICANT CHANGE UP (ref 6–8.3)
RBC # BLD: 4.76 M/UL — SIGNIFICANT CHANGE UP (ref 3.8–5.2)
RBC # FLD: 15.2 % — HIGH (ref 10.3–14.5)
SODIUM SERPL-SCNC: 139 MMOL/L — SIGNIFICANT CHANGE UP (ref 135–145)
WBC # BLD: 13.08 K/UL — HIGH (ref 3.8–10.5)
WBC # FLD AUTO: 13.08 K/UL — HIGH (ref 3.8–10.5)

## 2024-03-27 PROCEDURE — 99285 EMERGENCY DEPT VISIT HI MDM: CPT | Mod: 25

## 2024-03-27 PROCEDURE — 84702 CHORIONIC GONADOTROPIN TEST: CPT

## 2024-03-27 PROCEDURE — 93005 ELECTROCARDIOGRAM TRACING: CPT

## 2024-03-27 PROCEDURE — 36415 COLL VENOUS BLD VENIPUNCTURE: CPT

## 2024-03-27 PROCEDURE — 80053 COMPREHEN METABOLIC PANEL: CPT

## 2024-03-27 PROCEDURE — 85025 COMPLETE CBC W/AUTO DIFF WBC: CPT

## 2024-03-27 PROCEDURE — 96374 THER/PROPH/DIAG INJ IV PUSH: CPT

## 2024-03-27 PROCEDURE — 94640 AIRWAY INHALATION TREATMENT: CPT

## 2024-03-27 PROCEDURE — 99285 EMERGENCY DEPT VISIT HI MDM: CPT

## 2024-03-27 RX ORDER — FLUTICASONE FUROATE AND VILANTEROL TRIFENATATE 200; 25 UG/1; UG/1
200-25 POWDER RESPIRATORY (INHALATION)
Qty: 1 | Refills: 2 | Status: ACTIVE | COMMUNITY
Start: 2024-03-27 | End: 1900-01-01

## 2024-03-27 RX ORDER — BUDESONIDE AND FORMOTEROL FUMARATE DIHYDRATE 160; 4.5 UG/1; UG/1
160-4.5 AEROSOL RESPIRATORY (INHALATION) TWICE DAILY
Qty: 3 | Refills: 0 | Status: DISCONTINUED | COMMUNITY
Start: 2020-02-20 | End: 2024-03-27

## 2024-03-27 RX ORDER — ALBUTEROL 90 UG/1
3 AEROSOL, METERED ORAL
Qty: 5 | Refills: 0
Start: 2024-03-27

## 2024-03-27 RX ORDER — IPRATROPIUM/ALBUTEROL SULFATE 18-103MCG
3 AEROSOL WITH ADAPTER (GRAM) INHALATION
Refills: 0 | Status: COMPLETED | OUTPATIENT
Start: 2024-03-27 | End: 2024-03-27

## 2024-03-27 RX ADMIN — Medication 3 MILLILITER(S): at 18:07

## 2024-03-27 RX ADMIN — Medication 3 MILLILITER(S): at 18:30

## 2024-03-27 RX ADMIN — Medication 3 MILLILITER(S): at 18:57

## 2024-03-27 RX ADMIN — Medication 125 MILLIGRAM(S): at 18:06

## 2024-03-27 NOTE — ED PROVIDER NOTE - NS_EDPROVIDERDISPOUSERTYPE_ED_A_ED
lisinopril-hydrochlorothiazide   Can we please refill this today  Patient has been out of medication for days and she does have an apt tomorrow with us  Attending Attestation (For Attendings USE Only)...

## 2024-03-27 NOTE — ED PROVIDER NOTE - CLINICAL SUMMARY MEDICAL DECISION MAKING FREE TEXT BOX
38-year-old female history of asthma presents ED with shortness of breath and wheezing.  Patient denies cough no fever.  As per patient feels typical of her asthma.  Patient speaking full sentences.  Wheezing noted on exam.  Will give DuoNebs, steroids, reassess

## 2024-03-27 NOTE — ED PROVIDER NOTE - PATIENT PORTAL LINK FT
You can access the FollowMyHealth Patient Portal offered by [facility] by registering at the following website: [email or web address]. By joining [facility]’s FollowMyHealth portal, you will also be able to view your health information using other applications (apps) compatible with our system.

## 2024-03-27 NOTE — ED PROVIDER NOTE - OBJECTIVE STATEMENT
38-year-old female history of hypertension and asthma, never intubated, presents to ED with shortness of breath and wheezing.  As per patient symptoms began last week for which she went to an urgent care.  Patient was given nebulizer treatments and a prescription for steroids.  As per patient for the last 2 days she has felt the wheezing returned.  No cough, no fever, no nausea, no vomiting, no calf tenderness, no lower extremity swelling.  As per patient this feels typical of her asthma

## 2024-03-27 NOTE — ED PROVIDER NOTE - NSFOLLOWUPINSTRUCTIONS_ED_ALL_ED_FT
Asthma, Adult  Outline of a person's upper body showing the lungs, with close-ups of two airways, one normal and one narrow.  Asthma is a long-term (chronic) condition that causes recurrent episodes in which the lower airways in the lungs become tight and narrow. The narrowing is caused by inflammation and tightening of the smooth muscle around the lower airways.    Asthma episodes, also called asthma attacks or asthma flares, may cause coughing, making high-pitched whistling sounds when you breathe, most often when you breathe out (wheezing), shortness of breath, and chest pain. The airways may produce extra mucus caused by the inflammation and irritation. During an attack, it can be difficult to breathe. Asthma attacks can range from minor to life-threatening.    Asthma cannot be cured, but medicines and lifestyle changes can help control it and treat acute attacks. It is important to keep your asthma well controlled so the condition does not interfere with your daily life.    What are the causes?  This condition is believed to be caused by inherited (genetic) and environmental factors, but its exact cause is not known.    What can trigger an asthma attack?    Many things can bring on an asthma attack or make symptoms worse. These triggers are different for every person. Common triggers include:  Allergens and irritants like mold, dust, pet dander, cockroaches, pollen, air pollution, and chemical odors.  Cigarette smoke.  Weather changes and cold air.  Stress and strong emotional responses such as crying or laughing hard.  Certain medications such as aspirin or beta blockers.  Infections and inflammatory conditions, such as the flu, a cold, pneumonia, or inflammation of the nasal membranes (rhinitis).  Gastroesophageal reflux disease (GERD).  What are the signs or symptoms?  Symptoms may occur right after exposure to an asthma trigger or hours later and can vary by person. Common signs and symptoms include:  Wheezing.  Trouble breathing (shortness of breath).  Excessive nighttime or early morning coughing.  Chest tightness.  Tiredness (fatigue) with minimal activity.  Difficulty talking in complete sentences.  Poor exercise tolerance.  How is this diagnosed?  This condition is diagnosed based on:  A physical exam and your medical history.  Tests, which may include:  Lung function studies to evaluate the flow of air in your lungs.  Allergy tests.  Imaging tests, such as X-rays.  How is this treated?  There is no cure, but symptoms can be controlled with proper treatment. Treatment usually involves:  Identifying and avoiding your asthma triggers.  Inhaled medicines. Two types are commonly used to treat asthma, depending on severity:  Controller medicines. These help prevent asthma symptoms from occurring. They are taken every day.  Fast-acting reliever or rescue medicines. These quickly relieve asthma symptoms. They are used as needed and provide short-term relief.  Using other medicines, such as:  Allergy medicines, such as antihistamines, if your asthma attacks are triggered by allergens.  Immune medicines (immunomodulators). These are medicines that help control the immune system.  Using supplemental oxygen. This is only needed during a severe episode.  Creating an asthma action plan. An asthma action plan is a written plan for managing and treating your asthma attacks. This plan includes:  A list of your asthma triggers and how to avoid them.  Information about when medicines should be taken and when their dosage should be changed.  Instructions about using a device called a peak flow meter. A peak flow meter measures how well the lungs are working and the severity of your asthma. It helps you monitor your condition.  Follow these instructions at home:  Take over-the-counter and prescription medicines only as told by your health care provider.  Stay up to date on all vaccinations as recommended by your healthcare provider, including vaccines for the flu and pneumonia.  Use a peak flow meter and keep track of your peak flow readings.  Understand and use your asthma action plan to address any asthma flares.  Do not smoke or allow anyone to smoke in your home.  Contact a health care provider if:  You have wheezing, shortness of breath, or a cough that is not responding to medicines.  Your medicines are causing side effects, such as a rash, itching, swelling, or trouble breathing.  You need to use a reliever medicine more than 2–3 times a week.  Your peak flow reading is still at 50–79% of your personal best after following your action plan for 1 hour.  You have a fever and shortness of breath.  Get help right away if:  You are getting worse and do not respond to treatment during an asthma attack.  You are short of breath when at rest or when doing very little physical activity.  You have difficulty eating, drinking, or talking.  You have chest pain or tightness.  You develop a fast heartbeat or palpitations.  You have a bluish color to your lips or fingernails.  You are light-headed or dizzy, or you faint.  Your peak flow reading is less than 50% of your personal best.  You feel too tired to breathe normally.  These symptoms may be an emergency. Get help right away. Call 911.  Do not wait to see if the symptoms will go away.  Do not drive yourself to the hospital.  Summary  Asthma is a long-term (chronic) condition that causes recurrent episodes in which the airways become tight and narrow. Asthma episodes, also called asthma attacks or asthma flares, can cause coughing, wheezing, shortness of breath, and chest pain.  Asthma cannot be cured, but medicines and lifestyle changes can help keep it well controlled and prevent asthma flares.  Make sure you understand how to avoid triggers and how and when to use your medicines.  Asthma attacks can range from minor to life-threatening. Get help right away if you have an asthma attack and do not respond to treatment with your usual rescue medicines.  This information is not intended to replace advice given to you by your health care provider. Make sure you discuss any questions you have with your health care provider.    Document Revised: 10/05/2022 Document Reviewed: 09/26/2022  Elsevier Patient Education © 2024 [employer]

## 2024-03-27 NOTE — ED ADULT NURSE REASSESSMENT NOTE - NS ED NURSE REASSESS COMMENT FT1
Pt is A&O x 3, reports improvement in breathing s/p medications, see MAR. MD Lourdes made aware. Pt is A&O x 3. Pt reports improvement in breathing s/p medications, see MAR. MD Lourdes made aware.

## 2024-03-27 NOTE — ED PROVIDER NOTE - PROGRESS NOTE DETAILS
Wheezing improved.  Patient reassessed and feels well.  Will DC.  Patient has a prescription of steroids waiting for her at pharmacy sent by her pulmonologist.

## 2024-03-27 NOTE — ED ADULT NURSE NOTE - OBJECTIVE STATEMENT
Pt presents to the ED c/o SOB and wheezing x today. Pt endorses hx of asthma. Pt denies fever, body aches/ chills/ N/V and dizziness. Pt noted to be tachycardic, bedside EKG done and given to MD Freed. Pt presents to the ED c/o SOB and wheezing x today. Pt endorses hx of asthma. Pt denies fever, body aches/ chills/ N/V, chest pain and dizziness. Pt noted to be tachycardic, bedside EKG done and given to MD Freed.

## 2024-03-27 NOTE — ED ADULT NURSE NOTE - NSFALLUNIVINTERV_ED_ALL_ED
Bed/Stretcher in lowest position, wheels locked, appropriate side rails in place/Call bell, personal items and telephone in reach/Instruct patient to call for assistance before getting out of bed/chair/stretcher/Non-slip footwear applied when patient is off stretcher/[place] to call system/Physically safe environment - no spills, clutter or unnecessary equipment/Purposeful proactive rounding/Room/bathroom lighting operational, light cord in reach

## 2024-04-02 ENCOUNTER — APPOINTMENT (OUTPATIENT)
Dept: PULMONOLOGY | Facility: CLINIC | Age: 39
End: 2024-04-02

## 2024-04-14 ENCOUNTER — RX RENEWAL (OUTPATIENT)
Age: 39
End: 2024-04-14

## 2024-04-14 RX ORDER — OLMESARTAN MEDOXOMIL 20 MG/1
20 TABLET, FILM COATED ORAL DAILY
Qty: 180 | Refills: 3 | Status: ACTIVE | COMMUNITY
Start: 2020-06-17 | End: 1900-01-01

## 2024-04-22 ENCOUNTER — APPOINTMENT (OUTPATIENT)
Dept: PULMONOLOGY | Facility: CLINIC | Age: 39
End: 2024-04-22
Payer: COMMERCIAL

## 2024-04-22 VITALS
BODY MASS INDEX: 32.2 KG/M2 | RESPIRATION RATE: 16 BRPM | SYSTOLIC BLOOD PRESSURE: 110 MMHG | OXYGEN SATURATION: 97 % | DIASTOLIC BLOOD PRESSURE: 80 MMHG | HEIGHT: 62 IN | TEMPERATURE: 97 F | HEART RATE: 97 BPM | WEIGHT: 175 LBS

## 2024-04-22 DIAGNOSIS — J30.9 ALLERGIC RHINITIS, UNSPECIFIED: ICD-10-CM

## 2024-04-22 DIAGNOSIS — K21.9 GASTRO-ESOPHAGEAL REFLUX DISEASE W/OUT ESOPHAGITIS: ICD-10-CM

## 2024-04-22 DIAGNOSIS — J82.83 EOSINOPHILIC ASTHMA: ICD-10-CM

## 2024-04-22 DIAGNOSIS — D72.10 EOSINOPHILIA, UNSPECIFIED: ICD-10-CM

## 2024-04-22 DIAGNOSIS — E55.9 VITAMIN D DEFICIENCY, UNSPECIFIED: ICD-10-CM

## 2024-04-22 DIAGNOSIS — J45.50 SEVERE PERSISTENT ASTHMA, UNCOMPLICATED: ICD-10-CM

## 2024-04-22 DIAGNOSIS — F41.9 ANXIETY DISORDER, UNSPECIFIED: ICD-10-CM

## 2024-04-22 PROCEDURE — 99215 OFFICE O/P EST HI 40 MIN: CPT

## 2024-04-22 RX ORDER — DESLORATADINE 5 MG/1
5 TABLET ORAL DAILY
Qty: 30 | Refills: 3 | Status: ACTIVE | COMMUNITY
Start: 2023-10-18 | End: 1900-01-01

## 2024-04-22 RX ORDER — BUDESONIDE 0.5 MG/2ML
0.5 INHALANT ORAL TWICE DAILY
Qty: 3 | Refills: 2 | Status: ACTIVE | COMMUNITY
Start: 2024-04-22 | End: 1900-01-01

## 2024-04-22 RX ORDER — ALBUTEROL SULFATE 2.5 MG/3ML
(2.5 MG/3ML) SOLUTION RESPIRATORY (INHALATION) 4 TIMES DAILY
Qty: 3 | Refills: 2 | Status: ACTIVE | COMMUNITY
Start: 2024-04-22 | End: 1900-01-01

## 2024-04-22 NOTE — PHYSICAL EXAM
[No Acute Distress] : no acute distress [Normal Oropharynx] : normal oropharynx [III] : Mallampati Class: III [Normal Appearance] : normal appearance [No Neck Mass] : no neck mass [Normal Rate/Rhythm] : normal rate/rhythm [Normal S1, S2] : normal s1, s2 [No Resp Distress] : no resp distress [Clear to Auscultation Bilaterally] : clear to auscultation bilaterally [No Abnormalities] : no abnormalities [Benign] : benign [Normal Gait] : normal gait [No Clubbing] : no clubbing [No Cyanosis] : no cyanosis [No Edema] : no edema [FROM] : FROM [Normal Color/ Pigmentation] : normal color/ pigmentation [No Focal Deficits] : no focal deficits [Oriented x3] : oriented x3 [Normal Affect] : normal affect [TextBox_2] : ow [TextBox_68] : I:E ratio 1:3; clear

## 2024-04-22 NOTE — ASSESSMENT
[FreeTextEntry1] : Ms. Blue is a 38 year old female with a history of asthma / GERD / Allergy / overweight.  Her asthma is active and is both IgE mediated and eosinophil mediated (currently off on Nucala), and s/p delivery (7/12/2019) - s/p COVID-19 12/2021, 12/2022 and 6/2022 now s/p Nucala.- (controlled) -  active asthma 1/2022- @baseline- off nucala since 4/2023 (due to insurance)   problem 1: severe persistent asthma (controlled) -Continue Breo ellipta 1 inhalation daily (rinse and gargle after use (Symbicort not covered by insurance) *climate related - GERD - avoid mold exposure -Continue albuterol by the nebulizer QID PRN -continue Pulmicort (Budesonide) (0.5) BID by nebulizer, PRN  (gargle and rinse after use) -continue to use Ventolin PRN and before exercise -continue to use Singulair 10 mg before bed (not taking) -Inhaler technique reviewed as well as oral hygiene techniques reviewed with patient. Avoidance of cold air, extremes of temperature, rescue inhaler should be used before exercise. Order of medication reviewed with patient. Recommended use of a cool mist humidifier in the bedroom.   problem 2: Allergic Asthma (Elevated IgE) -her elevated IgE makes her a candidate for Xolair  Problem 3: Eosinophilic Asthma  -insurance not covering biologics as per pt - off all - Restart Nucala - symptomatic - move to [facility] (still)  -The safety and efficacy of Nucala was established in three double-blind, randomized, placebo-controlled trials in patients with severe asthma. Compared to a placebo, patients with severe asthma receiving Nucala had fewer exacerbation requiring hospitalization and/or emergency department visits, and a longer time to first exacerbation. In addition, patients with severe asthma receiving Nucala or Fasenra experienced greater reductions in their daily maintenance oral corticosteroid dose, while maintaining asthma control compared with patients receiving placebo. Treatment with Nucala did not result in a significant improvement in lung function, as measured by the volume of air exhaled by patients in one second. The most common side effects include: headache, injection site reactions, back pain, weakness, and fatigue; hypersensitivity reactions can occur within hours or days including swelling of the face, mouth, and tongue, fainting, dizziness, hives, breathing problems, and rash; herpes zoster infections have occurred. The drug is a monoclonal antibody that inhibits interleukin-5 which helps regular eosinophils, a type of white blood cell that contributes to asthma. The over-production of eosinophils can cause inflammation in the lungs, increasing the frequency of asthma attacks. Patients must also take other medications, including high dose inhaled corticosteroids and at least one additional asthma drug  problem 4: allergic rhinitis -continue Desloratadine daily -continue to use Flonase 1 sniff each nostril BID -continue to use Xyzal 5 mg before bed  -Environmental measures for allergies were encouraged including mattress and pillow cover, air purifier, and environmental controls.  problem 5: low vitamin D -continue to use supplemental vitamin D -Has been associated with asthma exacerbations and increased allergic symptoms. The goal based on recent information is maintaining levels between 50-70 and low normal is 30. Recommended 50,000 units every two weeks to once a month depending on the level.   problem 6: GERD -(stable/controlled) -OTC Omeprazole PRN -DC Pepcid 40 mg QHS (found to be ineffective) -Rule of 2s: avoid eating too much, eating too late, eating too spicy, eating two hours before bed -Things to avoid including overeating, spicy foods, tight clothing, eating within three hours of bed, this list is not all inclusive.  -For treatment of reflux, possible options discussed including diet control, H2 blockers, PPIs, as well as coating motility agents discussed as treatment options. Timing of meals and proximity of last meal to sleep were discussed. If symptoms persist, a formal gastrointestinal evaluation is needed.   problem 7: overweight  - Recommended "10-Day Detox" by Zeke Chris for 2-3 weeks followed by probiotics  -Recommend "Muniq" OTC Supplement. patient will consider -Weight loss, exercise, and diet control were discussed and are highly encouraged. Treatment options were given such as, aqua therapy, and contacting a nutritionist. Recommended to use the elliptical, stationary bike, less use of treadmill.   Problem 8: Health Maintenance/COVID19 Precautions : s/p COVID-19 infection 12/2020 and 6/2022, 12/2022 -recommended SaNOtize nasal spray  - S/p Covid 19 vaccine (Pfizer) x3  - Clean your hands often. Wash your hands often with soap and water for at least 20 seconds, especially after blowing your nose, coughing, or sneezing, or having been in a public place. - If soap and water are not available, use a hand  that contains at least 60% alcohol. - To the extent possible, avoid touching high-touch surfaces in public places - elevator buttons, door handles, handrails, handshaking with people, etc. Use a tissue or your sleeve to cover your hand or finger if you must touch something. - Wash your hands after touching surfaces in public places. - Avoid touching your face, nose, eyes, etc. - Clean and disinfect your home to remove germs: practice routine cleaning of frequently touched surfaces (for example: tables, doorknobs, light switches, handles, desks, toilets, faucets, sinks & cell phones) - Avoid crowds, especially in poorly ventilated spaces. Your risk of exposure to respiratory viruses like COVID-19 may increase in crowded, closed-in settings with little air circulation if there are people in the crowd who are sick. All patients are recommended to practice social distancing and stay at least 6 feet away from others.  - Avoid all non-essential travel including plane trips, and especially avoid embarking on cruise ships. -If COVID-19 is spreading in your community, take extra measures to put distance between yourself and other people to further reduce your risk of being exposed to this new virus. -Stay home as much as possible. - Consider ways of getting food brought to your house through family, social, or commercial networks -Be aware that the virus has been known to live in the air up to 3 hours post exposure, cardboard up to 24 hours post exposure, copper up to 4 hours post exposure, steel and plastic up to 2-3 days post exposure. Risk of transmission from these surfaces are affected by many variables. COVID-19 precautionary Immune Support Recommendations: -OTC Vitamin C 500mg BID  -OTC Quercetin 250-500mg BID  -OTC Zinc 75-100mg per day  -OTC Melatonin 1 or 2mg a night  -OTC Vitamin D 1-4000mg per day  -OTC Tonic Water 8oz per day -Water 0.5-1 gallon per day Asthma and COVID19: You need to make sure your asthma is under control. This often requires the use of inhaled corticosteroids (and sometimes oral corticosteroids). Inhaled corticosteroids do not likely reduce your immune system's ability to fight infections, but oral corticosteroids may. It is important to use the steps above to protect yourself to limit your exposure to any respiratory virus.   problem 9: health maintenance  -recommended yearly flu shot after October 15 (2023) -recommended strep pneumonia vaccines: Prevnar-13 vaccine, followed by Pneumo vaccine 23 one year following -recommended early intervention for URIs -recommended regular osteoporosis evaluations -recommended early dermatological evaluations -recommended after the age of 50 to the age of 70, colonoscopy every 5 years   Patient may follow up in 4-6 months with Dr. Kelley She is encouraged to call with any changes, concerns, or questions

## 2024-04-22 NOTE — REVIEW OF SYSTEMS
[Recent Wt Gain (___ Lbs)] : ~T recent [unfilled] lb weight gain [Recent Wt Loss (___ Lbs)] : ~T recent [unfilled] lb weight loss [Nasal Congestion] : nasal congestion [Chest Discomfort] : no chest discomfort [Seasonal Allergies] : seasonal allergies [GERD] : gerd [Negative] : Endocrine

## 2024-04-22 NOTE — HISTORY OF PRESENT ILLNESS
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 -reports reflux symptoms trigger wheezing -reports tomato sauce triggers heartburn; famotidine was ineffective; now takes Omeprazole only as needed -reports she stopped taking immune support supplements; does state that she found them helpful in building her immune support; stated she will start taking them again.  denies any headaches, nausea, vomiting, fever, chills, sweats, chest pain, chest pressure, palpitations, coughing, wheezing, fatigue, constipation, dysphagia, dizziness, leg swelling, leg pain, itchy eyes, itchy ears, heartburn, reflux or sour taste in the mouth.  LAST VISIT 10/9/2023 Ms. Blue is a 37 year old female with a history of allergic eosinophilia, eosinophilic asthma, allergic rhinitis, elevated IgE, severe persistent asthma and vitamin D deficiency presenting today for a follow up visit. Her chief complaint is   - she notes feeling generally well - she notes her energy levels are good - she notes walking on the treadmill for exercise - she notes occasional itchy ears - she notes her asthma has been under control and she hasn't needed her inhalers - she notes her menstrual cycle is normal - she notes not starting Tezspire yet - she notes no new medications, vitamins, or supplements  -she denies any headaches, nausea, emesis, fever, chills, sweats, chest pain, chest pressure, coughing, wheezing, palpitations, constipation, diarrhea, vertigo, dysphagia, heartburn, reflux, itchy eyes, itchy ears, leg swelling, leg pain, arthralgias, myalgias, or sour taste in the mouth.

## 2024-04-27 ENCOUNTER — RX RENEWAL (OUTPATIENT)
Age: 39
End: 2024-04-27

## 2024-04-27 RX ORDER — ALBUTEROL SULFATE 90 UG/1
108 (90 BASE) INHALANT RESPIRATORY (INHALATION)
Qty: 3 | Refills: 1 | Status: ACTIVE | COMMUNITY
Start: 2021-08-30 | End: 1900-01-01

## 2024-05-02 ENCOUNTER — TRANSCRIPTION ENCOUNTER (OUTPATIENT)
Age: 39
End: 2024-05-02

## 2024-06-17 ENCOUNTER — APPOINTMENT (OUTPATIENT)
Dept: OBGYN | Facility: CLINIC | Age: 39
End: 2024-06-17
Payer: COMMERCIAL

## 2024-06-17 VITALS — DIASTOLIC BLOOD PRESSURE: 93 MMHG | WEIGHT: 163 LBS | SYSTOLIC BLOOD PRESSURE: 134 MMHG | BODY MASS INDEX: 29.81 KG/M2

## 2024-06-17 DIAGNOSIS — H10.13 ACUTE ATOPIC CONJUNCTIVITIS, BILATERAL: ICD-10-CM

## 2024-06-17 DIAGNOSIS — Z01.419 ENCOUNTER FOR GYNECOLOGICAL EXAMINATION (GENERAL) (ROUTINE) W/OUT ABNORMAL FINDINGS: ICD-10-CM

## 2024-06-17 PROCEDURE — 99385 PREV VISIT NEW AGE 18-39: CPT

## 2024-06-17 RX ORDER — SYRING-NEEDL,DISP,INSUL,0.3 ML 31 GX5/16"
31G X 5/16" SYRINGE, EMPTY DISPOSABLE MISCELLANEOUS
Qty: 1 | Refills: 3 | Status: COMPLETED | COMMUNITY
Start: 2019-05-24 | End: 2024-06-17

## 2024-06-17 RX ORDER — PREDNISONE 10 MG/1
10 TABLET ORAL
Qty: 50 | Refills: 0 | Status: COMPLETED | COMMUNITY
Start: 2023-01-26 | End: 2024-06-17

## 2024-06-17 RX ORDER — TEZEPELUMAB-EKKO 210 MG/1.9ML
210 INJECTION, SOLUTION SUBCUTANEOUS
Qty: 1 | Refills: 11 | Status: COMPLETED | COMMUNITY
Start: 2023-07-24 | End: 2024-06-17

## 2024-06-17 RX ORDER — LANCETS
EACH MISCELLANEOUS
Qty: 1 | Refills: 3 | Status: COMPLETED | COMMUNITY
Start: 2017-06-07 | End: 2024-06-17

## 2024-06-17 RX ORDER — PREDNISONE 10 MG/1
10 TABLET ORAL
Qty: 21 | Refills: 0 | Status: COMPLETED | COMMUNITY
Start: 2024-03-27 | End: 2024-06-17

## 2024-06-17 RX ORDER — BLOOD SUGAR DIAGNOSTIC
STRIP MISCELLANEOUS
Qty: 1 | Refills: 5 | Status: COMPLETED | COMMUNITY
Start: 2018-12-03 | End: 2024-06-17

## 2024-06-17 RX ORDER — BLOOD-GLUCOSE METER
W/DEVICE KIT MISCELLANEOUS
Qty: 1 | Refills: 0 | Status: COMPLETED | COMMUNITY
Start: 2017-06-07 | End: 2024-06-17

## 2024-06-17 RX ORDER — EPINEPHRINE 0.3 MG/.3ML
0.3 INJECTION INTRAMUSCULAR
Qty: 1 | Refills: 1 | Status: COMPLETED | COMMUNITY
Start: 2018-09-21 | End: 2024-06-17

## 2024-06-17 RX ORDER — ISOPROPYL ALCOHOL 0.7 ML/ML
SWAB TOPICAL
Qty: 2 | Refills: 0 | Status: COMPLETED | COMMUNITY
Start: 2019-05-23 | End: 2024-06-17

## 2024-06-17 RX ORDER — BLOOD-GLUCOSE METER
KIT MISCELLANEOUS
Qty: 1 | Refills: 3 | Status: COMPLETED | COMMUNITY
Start: 2017-06-07 | End: 2024-06-17

## 2024-06-17 NOTE — HISTORY OF PRESENT ILLNESS
[FreeTextEntry1] : 37YO P2 LMP 6/9, menses regular q27d, not too heavy, thinking about salpingectomies. No complaints. [PapSmeardate] : 2018

## 2024-06-17 NOTE — PHYSICAL EXAM
[Chaperone Present] : A chaperone was present in the examining room during all aspects of the physical examination [76644] : A chaperone was present during the pelvic exam. [FreeTextEntry2] : [place] [Appropriately responsive] : appropriately responsive [Alert] : alert [No Acute Distress] : no acute distress [No Lymphadenopathy] : no lymphadenopathy [Regular Rate Rhythm] : regular rate rhythm [No Murmurs] : no murmurs [Clear to Auscultation B/L] : clear to auscultation bilaterally [Soft] : soft [Non-tender] : non-tender [Non-distended] : non-distended [No HSM] : No HSM [No Lesions] : no lesions [No Mass] : no mass [Oriented x3] : oriented x3 [Examination Of The Breasts] : a normal appearance [No Masses] : no breast masses were palpable [Labia Majora] : normal [Labia Minora] : normal [Normal] : normal [Uterine Adnexae] : normal

## 2024-06-21 LAB
CYTOLOGY CVX/VAG DOC THIN PREP: NORMAL
HPV HIGH+LOW RISK DNA PNL CVX: NOT DETECTED

## 2024-07-23 ENCOUNTER — RX RENEWAL (OUTPATIENT)
Age: 39
End: 2024-07-23

## 2024-07-24 ENCOUNTER — RX RENEWAL (OUTPATIENT)
Age: 39
End: 2024-07-24

## 2024-08-07 ENCOUNTER — RX RENEWAL (OUTPATIENT)
Age: 39
End: 2024-08-07

## 2024-08-13 ENCOUNTER — TRANSCRIPTION ENCOUNTER (OUTPATIENT)
Age: 39
End: 2024-08-13

## 2024-08-13 DIAGNOSIS — Z00.00 ENCOUNTER FOR GENERAL ADULT MEDICAL EXAMINATION W/OUT ABNORMAL FINDINGS: ICD-10-CM

## 2024-08-21 ENCOUNTER — TRANSCRIPTION ENCOUNTER (OUTPATIENT)
Age: 39
End: 2024-08-21

## 2024-08-22 ENCOUNTER — APPOINTMENT (OUTPATIENT)
Dept: INTERNAL MEDICINE | Facility: CLINIC | Age: 39
End: 2024-08-22
Payer: COMMERCIAL

## 2024-08-22 ENCOUNTER — TRANSCRIPTION ENCOUNTER (OUTPATIENT)
Age: 39
End: 2024-08-22

## 2024-08-22 VITALS — DIASTOLIC BLOOD PRESSURE: 84 MMHG | SYSTOLIC BLOOD PRESSURE: 128 MMHG

## 2024-08-22 VITALS
HEIGHT: 62 IN | OXYGEN SATURATION: 97 % | SYSTOLIC BLOOD PRESSURE: 140 MMHG | DIASTOLIC BLOOD PRESSURE: 98 MMHG | HEART RATE: 81 BPM | BODY MASS INDEX: 28.71 KG/M2 | WEIGHT: 156 LBS

## 2024-08-22 DIAGNOSIS — Z02.89 ENCOUNTER FOR OTHER ADMINISTRATIVE EXAMINATIONS: ICD-10-CM

## 2024-08-22 DIAGNOSIS — I10 ESSENTIAL (PRIMARY) HYPERTENSION: ICD-10-CM

## 2024-08-22 PROCEDURE — 99214 OFFICE O/P EST MOD 30 MIN: CPT

## 2024-08-22 NOTE — ASSESSMENT
[FreeTextEntry1] : 1) Vit C risk of Ca++ oxalate stones  Recommend eat vit C and not take supplement 2) varicella non responder  vaccines x 2 2014 documented 3) MMR titres +  documented 4) staff advised Quant Gold was not ordered but it was ordered August 13 2024  Reorder for today  5) form filled out will call patient when Quat Gold Back 6) HTN consider increase HCTZ 12.5mg---> 25mg if DBP remains elevated  F/U check Sept 20 2024 w PCP

## 2024-08-22 NOTE — PHYSICAL EXAM
[No Respiratory Distress] : no respiratory distress  [No Accessory Muscle Use] : no accessory muscle use [Clear to Auscultation] : lungs were clear to auscultation bilaterally [Normal Rate] : normal rate  [Regular Rhythm] : with a regular rhythm [Normal S1, S2] : normal S1 and S2 [No Murmur] : no murmur heard

## 2024-08-22 NOTE — HISTORY OF PRESENT ILLNESS
[de-identified] : 39 yo DM, HTN  GERD PPI Given 10-Day-Detox for weight Pulmonary also given vitamin C 500mg CP-->eosinophilic asthma, allergic eosinophilia, allergic rhinitis, COVID 12/2020 06/2022 consider Nucala monoclonal Ab  Did quite well but insurance stopped covering after 2 YEARS

## 2024-09-05 ENCOUNTER — NON-APPOINTMENT (OUTPATIENT)
Age: 39
End: 2024-09-05

## 2024-09-20 ENCOUNTER — APPOINTMENT (OUTPATIENT)
Dept: INTERNAL MEDICINE | Facility: CLINIC | Age: 39
End: 2024-09-20
